# Patient Record
Sex: FEMALE | Race: OTHER | Employment: UNEMPLOYED | URBAN - METROPOLITAN AREA
[De-identification: names, ages, dates, MRNs, and addresses within clinical notes are randomized per-mention and may not be internally consistent; named-entity substitution may affect disease eponyms.]

---

## 2017-01-11 ENCOUNTER — TELEPHONE (OUTPATIENT)
Dept: OBGYN CLINIC | Facility: CLINIC | Age: 22
End: 2017-01-11

## 2017-01-11 NOTE — TELEPHONE ENCOUNTER
Pt reported she has been bleeding since Dec 28 th. Changes pad twice a day, bleeding not heavy. Intermittent pelvic pain 8/10. Taking tylenol 300 mg as needed, is giving some relief.    Pt reported after para guard insertion she didn't get her menses until

## 2017-01-13 ENCOUNTER — OFFICE VISIT (OUTPATIENT)
Dept: OBGYN CLINIC | Facility: CLINIC | Age: 22
End: 2017-01-13

## 2017-01-13 VITALS — WEIGHT: 207 LBS | BODY MASS INDEX: 37 KG/M2 | SYSTOLIC BLOOD PRESSURE: 120 MMHG | DIASTOLIC BLOOD PRESSURE: 84 MMHG

## 2017-01-13 DIAGNOSIS — Z30.431 IUD CHECK UP: ICD-10-CM

## 2017-01-13 DIAGNOSIS — N94.6 DYSMENORRHEA: Primary | ICD-10-CM

## 2017-01-13 DIAGNOSIS — Z30.011 ENCOUNTER FOR INITIAL PRESCRIPTION OF CONTRACEPTIVE PILLS: ICD-10-CM

## 2017-01-13 DIAGNOSIS — N92.6 IRREGULAR MENSES: ICD-10-CM

## 2017-01-13 PROCEDURE — 76856 US EXAM PELVIC COMPLETE: CPT | Performed by: OBSTETRICS & GYNECOLOGY

## 2017-01-13 PROCEDURE — 99214 OFFICE O/P EST MOD 30 MIN: CPT | Performed by: OBSTETRICS & GYNECOLOGY

## 2017-01-13 RX ORDER — ACETAMINOPHEN AND CODEINE PHOSPHATE 120; 12 MG/5ML; MG/5ML
0.35 SOLUTION ORAL DAILY
Qty: 3 PACKAGE | Refills: 5 | Status: SHIPPED | OUTPATIENT
Start: 2017-01-13 | End: 2017-02-10

## 2017-01-23 ENCOUNTER — TELEPHONE (OUTPATIENT)
Dept: OBGYN CLINIC | Facility: CLINIC | Age: 22
End: 2017-01-23

## 2017-01-23 ENCOUNTER — TELEPHONE (OUTPATIENT)
Dept: INTERNAL MEDICINE CLINIC | Facility: CLINIC | Age: 22
End: 2017-01-23

## 2017-01-23 ENCOUNTER — OFFICE VISIT (OUTPATIENT)
Dept: OBGYN CLINIC | Facility: CLINIC | Age: 22
End: 2017-01-23

## 2017-01-23 VITALS
SYSTOLIC BLOOD PRESSURE: 106 MMHG | BODY MASS INDEX: 38 KG/M2 | HEART RATE: 85 BPM | WEIGHT: 212.81 LBS | DIASTOLIC BLOOD PRESSURE: 66 MMHG

## 2017-01-23 DIAGNOSIS — T83.89XA HEAVY MENSES DUE TO IUD (HCC): Primary | ICD-10-CM

## 2017-01-23 DIAGNOSIS — Z30.09 ENCOUNTER FOR OTHER GENERAL COUNSELING OR ADVICE ON CONTRACEPTION: ICD-10-CM

## 2017-01-23 DIAGNOSIS — N92.0 HEAVY MENSES DUE TO IUD (HCC): Primary | ICD-10-CM

## 2017-01-23 DIAGNOSIS — N94.6 DYSMENORRHEA: ICD-10-CM

## 2017-01-23 PROCEDURE — 99214 OFFICE O/P EST MOD 30 MIN: CPT | Performed by: OBSTETRICS & GYNECOLOGY

## 2017-01-23 PROCEDURE — 58301 REMOVE INTRAUTERINE DEVICE: CPT | Performed by: OBSTETRICS & GYNECOLOGY

## 2017-01-23 NOTE — TELEPHONE ENCOUNTER
Patient is calling states that she has been having a heavy menstrual period since Friday. She states she has to change her pad every 2-3 hours. She is now feeling weak, and dizzy.  She was also seen by Bettye Renee today to get her IUD removed to see if that

## 2017-01-23 NOTE — TELEPHONE ENCOUNTER
Per pt saturating pad every 2-3 hours since Friday. Got menses on 12/28,  was given pills OCP pills to take once daily for 10 days to normalize period. Per pt bleeding finished Sunday, but then began bleeding Tuesday again. Friday began bleeding heavily.

## 2017-01-23 NOTE — PROGRESS NOTES
HPI:   Paz Xiao is a 24year old female who presents for a hx of heavy menses and cramps/dysmenorrhea on paragard iud,  Pt counseled extensivevly on contraceptive options, all questions answered. Pt wants paragard removed today.       Arsen Rowe Smokeless Status: Never Used                        Alcohol Use: No                     REVIEW OF SYSTEMS:   GENERAL: feels well otherwise  SKIN: denies any unusual skin lesions  EYES:denies blurred vision or double vision  HEENT: denies nasal indicates understanding of these issues and agrees to the plan. The patient is asked to return for f/u.

## 2017-01-23 NOTE — TELEPHONE ENCOUNTER
[place] Islander SPEAKING / PT C/O HEAVY BLEEDING AND LONGER PERIODS , PER MOM VERY PAINFUL , PLS ADVS

## 2017-01-23 NOTE — TELEPHONE ENCOUNTER
Dr Dawna Martin said the bleeding was because of the IUD and instructed to take tylenol and go to ED if feeling worse. I called the patient she is feeling worse said she has some chest pain. Advised patient to go to [facility] Emergency department to be seen.  Pa

## 2017-01-24 ENCOUNTER — HOSPITAL ENCOUNTER (EMERGENCY)
Facility: HOSPITAL | Age: 22
Discharge: HOME | End: 2017-01-24
Attending: EMERGENCY MEDICINE
Payer: MEDICAID

## 2017-01-24 VITALS
DIASTOLIC BLOOD PRESSURE: 69 MMHG | RESPIRATION RATE: 20 BRPM | HEART RATE: 82 BPM | OXYGEN SATURATION: 98 % | TEMPERATURE: 98 F | SYSTOLIC BLOOD PRESSURE: 105 MMHG

## 2017-01-24 DIAGNOSIS — N92.1 METRORRHAGIA: Primary | ICD-10-CM

## 2017-01-24 DIAGNOSIS — N93.8 DYSFUNCTIONAL UTERINE BLEEDING: ICD-10-CM

## 2017-01-24 LAB
ANION GAP SERPL CALC-SCNC: 9 MMOL/L (ref 0–18)
B-HCG UR QL: NEGATIVE
BASOPHILS # BLD: 0 K/UL (ref 0–0.2)
BASOPHILS NFR BLD: 1 %
BILIRUB UR QL: NEGATIVE
BUN SERPL-MCNC: 9 MG/DL (ref 8–20)
BUN/CREAT SERPL: 15.5 (ref 10–20)
CALCIUM SERPL-MCNC: 9.5 MG/DL (ref 8.5–10.5)
CHLORIDE SERPL-SCNC: 103 MMOL/L (ref 95–110)
CO2 SERPL-SCNC: 27 MMOL/L (ref 22–32)
COLOR UR: YELLOW
CREAT SERPL-MCNC: 0.58 MG/DL (ref 0.5–1.5)
D DIMER PPP FEU-MCNC: 0.33 MCG/ML
EOSINOPHIL # BLD: 0.1 K/UL (ref 0–0.7)
EOSINOPHIL NFR BLD: 1 %
ERYTHROCYTE [DISTWIDTH] IN BLOOD BY AUTOMATED COUNT: 13.7 % (ref 11–15)
GLUCOSE SERPL-MCNC: 111 MG/DL (ref 70–99)
GLUCOSE UR-MCNC: NEGATIVE MG/DL
HCT VFR BLD AUTO: 37 % (ref 35–48)
HGB BLD-MCNC: 12.5 G/DL (ref 12–16)
KETONES UR-MCNC: NEGATIVE MG/DL
LYMPHOCYTES # BLD: 1.8 K/UL (ref 1–4)
LYMPHOCYTES NFR BLD: 35 %
MCH RBC QN AUTO: 28.4 PG (ref 27–32)
MCHC RBC AUTO-ENTMCNC: 33.8 G/DL (ref 32–37)
MCV RBC AUTO: 84.1 FL (ref 80–100)
MONOCYTES # BLD: 0.4 K/UL (ref 0–1)
MONOCYTES NFR BLD: 7 %
NEUTROPHILS # BLD AUTO: 2.9 K/UL (ref 1.8–7.7)
NEUTROPHILS NFR BLD: 56 %
NITRITE UR QL STRIP.AUTO: NEGATIVE
OSMOLALITY UR CALC.SUM OF ELEC: 287 MOSM/KG (ref 275–295)
PH UR: 7 [PH] (ref 5–8)
PLATELET # BLD AUTO: 174 K/UL (ref 140–400)
PMV BLD AUTO: 9.7 FL (ref 7.4–10.3)
POTASSIUM SERPL-SCNC: 3.7 MMOL/L (ref 3.3–5.1)
PROT UR-MCNC: 30 MG/DL
RBC # BLD AUTO: 4.4 M/UL (ref 3.7–5.4)
RBC #/AREA URNS AUTO: 1141 /HPF
SODIUM SERPL-SCNC: 139 MMOL/L (ref 136–144)
SP GR UR STRIP: 1.01 (ref 1–1.03)
UROBILINOGEN UR STRIP-ACNC: <2
VIT C UR-MCNC: NEGATIVE MG/DL
WBC # BLD AUTO: 5.1 K/UL (ref 4–11)
WBC #/AREA URNS AUTO: 33 /HPF

## 2017-01-24 PROCEDURE — 81025 URINE PREGNANCY TEST: CPT

## 2017-01-24 PROCEDURE — 85025 COMPLETE CBC W/AUTO DIFF WBC: CPT

## 2017-01-24 PROCEDURE — 81001 URINALYSIS AUTO W/SCOPE: CPT | Performed by: EMERGENCY MEDICINE

## 2017-01-24 PROCEDURE — 93010 ELECTROCARDIOGRAM REPORT: CPT | Performed by: EMERGENCY MEDICINE

## 2017-01-24 PROCEDURE — 80048 BASIC METABOLIC PNL TOTAL CA: CPT

## 2017-01-24 PROCEDURE — 85379 FIBRIN DEGRADATION QUANT: CPT | Performed by: EMERGENCY MEDICINE

## 2017-01-24 PROCEDURE — 99284 EMERGENCY DEPT VISIT MOD MDM: CPT

## 2017-01-24 PROCEDURE — 36415 COLL VENOUS BLD VENIPUNCTURE: CPT

## 2017-01-24 PROCEDURE — 87086 URINE CULTURE/COLONY COUNT: CPT | Performed by: EMERGENCY MEDICINE

## 2017-01-24 PROCEDURE — 93005 ELECTROCARDIOGRAM TRACING: CPT

## 2017-01-25 ENCOUNTER — TELEPHONE (OUTPATIENT)
Dept: OBGYN CLINIC | Facility: CLINIC | Age: 22
End: 2017-01-25

## 2017-01-25 NOTE — ED INITIAL ASSESSMENT (HPI)
Pt has had menstrual bleeding since December 28. Pt has also had chest pain and back pain for the past 4 days.

## 2017-01-25 NOTE — ED PROVIDER NOTES
Patient Seen in: [facility] Emergency Department    History   Patient presents with:  Eval-G (gynecologic)  Dyspnea CARMEN SOB (respiratory)    Stated Complaint:     HPI    Patient is a 56-year-old female who states that she has been having persisten systems reviewed and negative except as noted above. PSFH elements reviewed from today and agreed except as otherwise stated in HPI.     Physical Exam     ED Triage Vitals   BP 01/24/17 1945 129/71 mmHg   Pulse 01/24/17 1945 83   Resp 01/24/17 1945 18 -----------         ------                     CBC W/ DIFFERENTIAL[[id number]]                              Final result                 Please view results for these tests on the individual orders.    D-DIMER   RAINBOW DRAW BLUE   RAINBOW

## 2017-01-25 NOTE — TELEPHONE ENCOUNTER
Burkinan SPEAKING / PT STTS DR PRESCRIBE BC TO STOP THE BLEEDING ,PER PT STILL BLEEDING AND FEELS WEAK , PLS ADVS

## 2017-01-25 NOTE — TELEPHONE ENCOUNTER
Pls note Per pt had Para guard removed this 2 days ago due to heavy bleeding. Was planned on ocp for bleeding, has been taking for 10 days, but started feeling   Dizziness and nausea, HA, short of breath and chest pressure.   Went to ER yesterday had norm

## 2017-02-24 ENCOUNTER — TELEPHONE (OUTPATIENT)
Dept: OBGYN CLINIC | Facility: CLINIC | Age: 22
End: 2017-02-24

## 2017-03-21 ENCOUNTER — HOSPITAL ENCOUNTER (EMERGENCY)
Facility: HOSPITAL | Age: 22
Discharge: HOME | End: 2017-03-21
Attending: EMERGENCY MEDICINE
Payer: MEDICAID

## 2017-03-21 ENCOUNTER — APPOINTMENT (OUTPATIENT)
Dept: CT IMAGING | Facility: HOSPITAL | Age: 22
End: 2017-03-21
Attending: EMERGENCY MEDICINE
Payer: MEDICAID

## 2017-03-21 VITALS
DIASTOLIC BLOOD PRESSURE: 74 MMHG | SYSTOLIC BLOOD PRESSURE: 126 MMHG | BODY MASS INDEX: 36 KG/M2 | OXYGEN SATURATION: 96 % | TEMPERATURE: 98 F | WEIGHT: 201 LBS | HEART RATE: 80 BPM | RESPIRATION RATE: 20 BRPM

## 2017-03-21 DIAGNOSIS — K57.92 ACUTE DIVERTICULITIS: Primary | ICD-10-CM

## 2017-03-21 LAB
ANION GAP SERPL CALC-SCNC: 6 MMOL/L (ref 0–18)
B-HCG UR QL: NEGATIVE
BASOPHILS # BLD: 0 K/UL (ref 0–0.2)
BASOPHILS NFR BLD: 0 %
BILIRUB UR QL: NEGATIVE
BUN SERPL-MCNC: 17 MG/DL (ref 8–20)
BUN/CREAT SERPL: 31.5 (ref 10–20)
CALCIUM SERPL-MCNC: 8.8 MG/DL (ref 8.5–10.5)
CHLORIDE SERPL-SCNC: 105 MMOL/L (ref 95–110)
CLARITY UR: CLEAR
CO2 SERPL-SCNC: 25 MMOL/L (ref 22–32)
COLOR UR: YELLOW
CREAT SERPL-MCNC: 0.54 MG/DL (ref 0.5–1.5)
EOSINOPHIL # BLD: 0.1 K/UL (ref 0–0.7)
EOSINOPHIL NFR BLD: 1 %
ERYTHROCYTE [DISTWIDTH] IN BLOOD BY AUTOMATED COUNT: 14.2 % (ref 11–15)
GLUCOSE SERPL-MCNC: 102 MG/DL (ref 70–99)
GLUCOSE UR-MCNC: NEGATIVE MG/DL
HCT VFR BLD AUTO: 36.2 % (ref 35–48)
HGB BLD-MCNC: 12 G/DL (ref 12–16)
KETONES UR-MCNC: NEGATIVE MG/DL
LEUKOCYTE ESTERASE UR QL STRIP.AUTO: NEGATIVE
LYMPHOCYTES # BLD: 2.7 K/UL (ref 1–4)
LYMPHOCYTES NFR BLD: 35 %
MCH RBC QN AUTO: 27.2 PG (ref 27–32)
MCHC RBC AUTO-ENTMCNC: 33.1 G/DL (ref 32–37)
MCV RBC AUTO: 82 FL (ref 80–100)
MONOCYTES # BLD: 0.4 K/UL (ref 0–1)
MONOCYTES NFR BLD: 6 %
NEUTROPHILS # BLD AUTO: 4.6 K/UL (ref 1.8–7.7)
NEUTROPHILS NFR BLD: 59 %
NITRITE UR QL STRIP.AUTO: NEGATIVE
OSMOLALITY UR CALC.SUM OF ELEC: 284 MOSM/KG (ref 275–295)
PH UR: 7 [PH] (ref 5–8)
PLATELET # BLD AUTO: 202 K/UL (ref 140–400)
PMV BLD AUTO: 9.8 FL (ref 7.4–10.3)
POTASSIUM SERPL-SCNC: 3.8 MMOL/L (ref 3.3–5.1)
PROT UR-MCNC: NEGATIVE MG/DL
RBC # BLD AUTO: 4.42 M/UL (ref 3.7–5.4)
RBC #/AREA URNS AUTO: 18 /HPF
SODIUM SERPL-SCNC: 136 MMOL/L (ref 136–144)
SP GR UR STRIP: 1.02 (ref 1–1.03)
UROBILINOGEN UR STRIP-ACNC: <2
VIT C UR-MCNC: 40 MG/DL
WBC # BLD AUTO: 7.9 K/UL (ref 4–11)
WBC #/AREA URNS AUTO: 1 /HPF

## 2017-03-21 PROCEDURE — 99284 EMERGENCY DEPT VISIT MOD MDM: CPT

## 2017-03-21 PROCEDURE — 96374 THER/PROPH/DIAG INJ IV PUSH: CPT

## 2017-03-21 PROCEDURE — 74176 CT ABD & PELVIS W/O CONTRAST: CPT

## 2017-03-21 PROCEDURE — 81025 URINE PREGNANCY TEST: CPT

## 2017-03-21 PROCEDURE — 81001 URINALYSIS AUTO W/SCOPE: CPT | Performed by: EMERGENCY MEDICINE

## 2017-03-21 PROCEDURE — 80048 BASIC METABOLIC PNL TOTAL CA: CPT | Performed by: EMERGENCY MEDICINE

## 2017-03-21 PROCEDURE — 85025 COMPLETE CBC W/AUTO DIFF WBC: CPT | Performed by: EMERGENCY MEDICINE

## 2017-03-21 RX ORDER — KETOROLAC TROMETHAMINE 30 MG/ML
30 INJECTION, SOLUTION INTRAMUSCULAR; INTRAVENOUS ONCE
Status: COMPLETED | OUTPATIENT
Start: 2017-03-21 | End: 2017-03-21

## 2017-03-21 RX ORDER — AMOXICILLIN AND CLAVULANATE POTASSIUM 875; 125 MG/1; MG/1
1 TABLET, FILM COATED ORAL 2 TIMES DAILY
Qty: 20 TABLET | Refills: 0 | Status: SHIPPED | OUTPATIENT
Start: 2017-03-21 | End: 2017-03-31

## 2017-03-21 RX ORDER — SACCHAROMYCES BOULARDII 250 MG
250 CAPSULE ORAL 2 TIMES DAILY
Qty: 20 CAPSULE | Refills: 0 | Status: SHIPPED | OUTPATIENT
Start: 2017-03-21 | End: 2017-03-31

## 2017-03-21 RX ORDER — KETOROLAC TROMETHAMINE 30 MG/ML
INJECTION, SOLUTION INTRAMUSCULAR; INTRAVENOUS
Status: DISCONTINUED
Start: 2017-03-21 | End: 2017-03-21

## 2017-03-21 RX ORDER — TRAMADOL HYDROCHLORIDE 50 MG/1
50 TABLET ORAL EVERY 4 HOURS PRN
Qty: 14 TABLET | Refills: 0 | Status: SHIPPED | OUTPATIENT
Start: 2017-03-21 | End: 2017-03-28

## 2017-03-21 NOTE — ED PROVIDER NOTES
Patient Seen in: [facility] Emergency Department    History   Patient presents with:  Abdomen/Flank Pain (GI/)  Vomiting    Stated Complaint:     HPI    Patient complains of lower abdominal pain that began yesterday. Pain described as sharp.   P HPI.  Constitutional and vital signs reviewed. All other systems reviewed and negative except as noted above. PSFH elements reviewed from today and agreed except as otherwise stated in HPI.     Physical Exam     ED Triage Vitals   BP 03/21/17 0254 1 Abnormality         Status                     ---------                               -----------         ------                     CBC W/ DIFFERENTIAL[[id number]]                              Final result                 Please view results for these te

## 2017-05-17 ENCOUNTER — OFFICE VISIT (OUTPATIENT)
Dept: INTERNAL MEDICINE CLINIC | Facility: CLINIC | Age: 22
End: 2017-05-17

## 2017-05-17 VITALS
WEIGHT: 211 LBS | OXYGEN SATURATION: 98 % | RESPIRATION RATE: 18 BRPM | SYSTOLIC BLOOD PRESSURE: 118 MMHG | HEIGHT: 63 IN | HEART RATE: 79 BPM | BODY MASS INDEX: 37.39 KG/M2 | DIASTOLIC BLOOD PRESSURE: 76 MMHG

## 2017-05-17 DIAGNOSIS — N89.8 VAGINAL DISCHARGE: Primary | ICD-10-CM

## 2017-05-17 DIAGNOSIS — R21 RASH: ICD-10-CM

## 2017-05-17 DIAGNOSIS — L85.8 KERATOSIS PILARIS: ICD-10-CM

## 2017-05-17 DIAGNOSIS — H53.9 VISION CHANGES: ICD-10-CM

## 2017-05-17 PROCEDURE — 87205 SMEAR GRAM STAIN: CPT | Performed by: FAMILY MEDICINE

## 2017-05-17 PROCEDURE — 87106 FUNGI IDENTIFICATION YEAST: CPT | Performed by: FAMILY MEDICINE

## 2017-05-17 PROCEDURE — 99213 OFFICE O/P EST LOW 20 MIN: CPT | Performed by: FAMILY MEDICINE

## 2017-05-17 PROCEDURE — 87808 TRICHOMONAS ASSAY W/OPTIC: CPT | Performed by: FAMILY MEDICINE

## 2017-05-17 RX ORDER — DIAPER,BRIEF,INFANT-TODD,DISP
1 EACH MISCELLANEOUS 2 TIMES DAILY
Qty: 56 G | Refills: 0 | Status: SHIPPED | OUTPATIENT
Start: 2017-05-17 | End: 2017-08-10

## 2017-05-17 RX ORDER — LORATADINE 10 MG/1
10 TABLET ORAL DAILY
Qty: 30 TABLET | Refills: 1 | Status: SHIPPED | OUTPATIENT
Start: 2017-05-17 | End: 2017-08-10

## 2017-05-17 NOTE — PATIENT INSTRUCTIONS
Allergy medicine daily    Try exfoliating and also using head and shoulders shampoo to arms    Gentle moisturizer such as aveeno or eucerin    hydrocoritisone to more irritated spots    Recommendations on weight loss:  - Drink 8 glasses of water a day  - D

## 2017-05-17 NOTE — PROGRESS NOTES
Galen Collier is a 24year old female who presents for vaginal discharge. The patient complains of vaginal discharge. Pt has had the sx's for 1 week . Pt has itching and had rash. There is no unusual odor. Denies dysuria.   Denies any abdominal pain or Never Smoker                      Smokeless Status: Never Used                        Alcohol Use: No                  REVIEW OF SYSTEMS:   GENERAL: feels tired, no lethargy, no fever, no chills  SKIN: denies any unusual skin lesions, rash.   LUNGS: denies

## 2017-05-20 NOTE — PROGRESS NOTES
Quick Note:    Please tell pt vaginal infection had small amount of yeast- I sent a prescription for diflucan- one time dose to pharmacy.  TY  ______

## 2017-05-22 ENCOUNTER — TELEPHONE (OUTPATIENT)
Dept: INTERNAL MEDICINE CLINIC | Facility: CLINIC | Age: 22
End: 2017-05-22

## 2017-05-22 RX ORDER — FLUCONAZOLE 150 MG/1
TABLET ORAL
COMMUNITY
Start: 2017-05-20 | End: 2017-06-30 | Stop reason: ALTCHOICE

## 2017-05-22 NOTE — TELEPHONE ENCOUNTER
Spoke with the patient she took the medication, explained the one pill should clear the yeast infection.  She will call back if symptoms persist

## 2017-05-26 ENCOUNTER — TELEPHONE (OUTPATIENT)
Dept: INTERNAL MEDICINE CLINIC | Facility: CLINIC | Age: 22
End: 2017-05-26

## 2017-05-26 DIAGNOSIS — N76.0 ACUTE VAGINITIS: ICD-10-CM

## 2017-05-26 DIAGNOSIS — R30.0 DYSURIA: Primary | ICD-10-CM

## 2017-05-26 RX ORDER — FLUCONAZOLE 150 MG/1
150 TABLET ORAL ONCE
Qty: 1 TABLET | Refills: 0 | Status: SHIPPED | OUTPATIENT
Start: 2017-05-26 | End: 2017-09-06

## 2017-05-26 NOTE — TELEPHONE ENCOUNTER
Notified that pt given results of vag panel and still c/o vaginal burning . L VM for pt that will rx second for 2nd diflucan and also topical monistat. If having any dysuria, asked pt to come in to give urine sample.  If not better next week, can see her in

## 2017-05-30 ENCOUNTER — OFFICE VISIT (OUTPATIENT)
Dept: OPTOMETRY | Facility: CLINIC | Age: 22
End: 2017-05-30

## 2017-05-30 DIAGNOSIS — H52.203 ASTIGMATISM OF BOTH EYES, UNSPECIFIED TYPE: Primary | ICD-10-CM

## 2017-05-30 PROCEDURE — 92004 COMPRE OPH EXAM NEW PT 1/>: CPT | Performed by: OPTOMETRIST

## 2017-05-30 PROCEDURE — 92015 DETERMINE REFRACTIVE STATE: CPT | Performed by: OPTOMETRIST

## 2017-05-30 NOTE — PROGRESS NOTES
Alejandro Lake is a 20year old female. HPI:     HPI     Patient is in for an annual eye exam. She used to wear glasses when she was 8years old . She wore for two years and then was told she did not need them anymore.  Has not had any correction since an Allergies    ROS:     ROS     Negative for: Constitutional, Gastrointestinal, Neurological, Skin, Genitourinary, Musculoskeletal, HENT, Endocrine, Cardiovascular, Eyes, Respiratory, Psychiatric, Allergic/Imm, Heme/Lymph    Last edited by Carmen Felix OD on as needed. Patient knows that since it has been over ten years since she wore glasses there may be some adaptation to new RX. No orders of the defined types were placed in this encounter.        Meds This Visit:    No prescriptions requested or order

## 2017-05-30 NOTE — ASSESSMENT & PLAN NOTE
New glasses RX given to update as needed. Patient knows that since it has been over ten years since she wore glasses there may be some adaptation to new RX.

## 2017-05-30 NOTE — PATIENT INSTRUCTIONS
Astigmatism of both eyes  New glasses RX given to update as needed. Patient knows that since it has been over ten years since she wore glasses there may be some adaptation to new RX.

## 2017-06-23 ENCOUNTER — OFFICE VISIT (OUTPATIENT)
Dept: OBGYN CLINIC | Facility: CLINIC | Age: 22
End: 2017-06-23

## 2017-06-23 VITALS
SYSTOLIC BLOOD PRESSURE: 110 MMHG | WEIGHT: 209.81 LBS | BODY MASS INDEX: 37 KG/M2 | HEART RATE: 77 BPM | DIASTOLIC BLOOD PRESSURE: 75 MMHG

## 2017-06-23 DIAGNOSIS — Z30.09 ENCOUNTER FOR OTHER GENERAL COUNSELING OR ADVICE ON CONTRACEPTION: ICD-10-CM

## 2017-06-23 DIAGNOSIS — Z01.419 WELL WOMAN EXAM WITH ROUTINE GYNECOLOGICAL EXAM: Primary | ICD-10-CM

## 2017-06-23 DIAGNOSIS — N92.4 EXCESSIVE BLEEDING IN PREMENOPAUSAL PERIOD: ICD-10-CM

## 2017-06-23 PROCEDURE — 99395 PREV VISIT EST AGE 18-39: CPT | Performed by: OBSTETRICS & GYNECOLOGY

## 2017-06-23 PROCEDURE — 99213 OFFICE O/P EST LOW 20 MIN: CPT | Performed by: OBSTETRICS & GYNECOLOGY

## 2017-06-23 NOTE — PROGRESS NOTES
HPI:   Charlotte Thao is a 20year old female who presents for an annual/pap. Pt requested contraceptive counseling, pt does not want to get pregnant anytime soon, counseled on options, pt wants mirena iud, all questions answered.  Risks/benefits/alternativ NG:Unknown sex; 8 week    CHOLECYSTECTOMY  2014    APPENDECTOMY        Family History   Problem Relation Age of Onset   • Diabetes Father    • Heart Disorder Father    • Heart Attack Father    • Hypertension Paternal Grandmother       Social History:     S 30 minutes three times weekly. The patient indicates understanding of these issues and agrees to the plan. Pt requested contraception counseling, counseled extesnively on options of contraception, all questions answered. Pt req mirena 1 week.

## 2017-06-30 ENCOUNTER — OFFICE VISIT (OUTPATIENT)
Dept: OBGYN CLINIC | Facility: CLINIC | Age: 22
End: 2017-06-30

## 2017-06-30 VITALS — BODY MASS INDEX: 37 KG/M2 | SYSTOLIC BLOOD PRESSURE: 104 MMHG | WEIGHT: 208 LBS | DIASTOLIC BLOOD PRESSURE: 60 MMHG

## 2017-06-30 DIAGNOSIS — Z97.5 IUD CONTRACEPTION: ICD-10-CM

## 2017-06-30 DIAGNOSIS — Z01.812 PRE-PROCEDURAL LABORATORY EXAMINATION: ICD-10-CM

## 2017-06-30 DIAGNOSIS — Z30.430 ENCOUNTER FOR INSERTION OF MIRENA IUD: Primary | ICD-10-CM

## 2017-06-30 DIAGNOSIS — Z30.09 ENCOUNTER FOR OTHER GENERAL COUNSELING OR ADVICE ON CONTRACEPTION: ICD-10-CM

## 2017-06-30 PROCEDURE — 58300 INSERT INTRAUTERINE DEVICE: CPT | Performed by: OBSTETRICS & GYNECOLOGY

## 2017-06-30 PROCEDURE — 99214 OFFICE O/P EST MOD 30 MIN: CPT | Performed by: OBSTETRICS & GYNECOLOGY

## 2017-06-30 PROCEDURE — 81025 URINE PREGNANCY TEST: CPT | Performed by: OBSTETRICS & GYNECOLOGY

## 2017-06-30 NOTE — PROGRESS NOTES
HPI:   Sudha Serra is a 20year old female who presents for a contraception consult and mirena iud insertion. . Pt counseled extensively, all questions answered. Wt Readings from Last 6 Encounters:  06/30/17 : 208 lb (94.3 kg)  06/23/17 : 209 lb 12. Smokeless tobacco: Never Used                      Alcohol use:  No                     REVIEW OF SYSTEMS:   GENERAL: feels well otherwise  SKIN: denies any unusual skin lesions  EYES:denies blurred of these issues and agrees to the plan.   The patient is asked to return for f/u 6 weeks

## 2017-08-01 ENCOUNTER — TELEPHONE (OUTPATIENT)
Dept: PEDIATRICS CLINIC | Facility: CLINIC | Age: 22
End: 2017-08-01

## 2017-08-01 NOTE — TELEPHONE ENCOUNTER
Per patient 3-4 weeks ago she had Mirena IUD inserted  C/o having menses for 10 days now, since the 22nd. Per pt yesterday she felt dizzy and nauseas. Today she is feeling better. Is only wearing panti liner now, bleeding seems to be tapering off.   Pt a

## 2017-08-01 NOTE — TELEPHONE ENCOUNTER
Pt had IUD inserted by dr Madelyn Vazquez and states she is in pain and feels nauseous. Pt states she also has her period.

## 2017-08-10 ENCOUNTER — OFFICE VISIT (OUTPATIENT)
Dept: OBGYN CLINIC | Facility: CLINIC | Age: 22
End: 2017-08-10

## 2017-08-10 VITALS
DIASTOLIC BLOOD PRESSURE: 72 MMHG | SYSTOLIC BLOOD PRESSURE: 105 MMHG | BODY MASS INDEX: 37 KG/M2 | HEART RATE: 91 BPM | WEIGHT: 210.81 LBS

## 2017-08-10 DIAGNOSIS — Z97.5 IUD (INTRAUTERINE DEVICE) IN PLACE: Primary | ICD-10-CM

## 2017-08-10 DIAGNOSIS — N92.1 BREAKTHROUGH BLEEDING: ICD-10-CM

## 2017-08-10 DIAGNOSIS — Z30.431 IUD CHECK UP: ICD-10-CM

## 2017-08-10 PROCEDURE — 99214 OFFICE O/P EST MOD 30 MIN: CPT | Performed by: OBSTETRICS & GYNECOLOGY

## 2017-08-10 NOTE — PROGRESS NOTES
HPI:   Carlos Manuel Mills is a 20year old female who presents for a f/u IUD mirena. Pt with irregular breakthrough bleeding. Pt counseled extensively, all questions answered.        Wt Readings from Last 6 Encounters:  08/10/17 : 210 lb 12.8 oz (95.6 kg)  06/ exertion  CARDIOVASCULAR: denies chest pain on exertion  GI: denies abdominal pain,denies heartburn  : denies dysuria, vaginal discharge or itching,periods regular   MUSCULOSKELETAL: denies back pain  NEURO: denies headaches  PSYCHE: denies depression or

## 2017-09-06 RX ORDER — FLUCONAZOLE 150 MG/1
TABLET ORAL
Qty: 1 TABLET | Refills: 0 | Status: SHIPPED | OUTPATIENT
Start: 2017-09-06 | End: 2018-04-17 | Stop reason: ALTCHOICE

## 2017-10-23 ENCOUNTER — HOSPITAL ENCOUNTER (EMERGENCY)
Facility: HOSPITAL | Age: 22
Discharge: HOME | End: 2017-10-23
Attending: EMERGENCY MEDICINE
Payer: COMMERCIAL

## 2017-10-23 VITALS
TEMPERATURE: 98 F | SYSTOLIC BLOOD PRESSURE: 107 MMHG | BODY MASS INDEX: 37.21 KG/M2 | HEART RATE: 67 BPM | WEIGHT: 210 LBS | OXYGEN SATURATION: 99 % | HEIGHT: 63 IN | DIASTOLIC BLOOD PRESSURE: 64 MMHG | RESPIRATION RATE: 18 BRPM

## 2017-10-23 DIAGNOSIS — H66.91 ACUTE RIGHT OTITIS MEDIA: Primary | ICD-10-CM

## 2017-10-23 DIAGNOSIS — H81.391 PERIPHERAL VERTIGO INVOLVING RIGHT EAR: ICD-10-CM

## 2017-10-23 PROCEDURE — 85025 COMPLETE CBC W/AUTO DIFF WBC: CPT

## 2017-10-23 PROCEDURE — 87186 SC STD MICRODIL/AGAR DIL: CPT | Performed by: EMERGENCY MEDICINE

## 2017-10-23 PROCEDURE — 93005 ELECTROCARDIOGRAM TRACING: CPT

## 2017-10-23 PROCEDURE — 81001 URINALYSIS AUTO W/SCOPE: CPT | Performed by: EMERGENCY MEDICINE

## 2017-10-23 PROCEDURE — 80048 BASIC METABOLIC PNL TOTAL CA: CPT

## 2017-10-23 PROCEDURE — 85025 COMPLETE CBC W/AUTO DIFF WBC: CPT | Performed by: EMERGENCY MEDICINE

## 2017-10-23 PROCEDURE — 87088 URINE BACTERIA CULTURE: CPT | Performed by: EMERGENCY MEDICINE

## 2017-10-23 PROCEDURE — 87086 URINE CULTURE/COLONY COUNT: CPT | Performed by: EMERGENCY MEDICINE

## 2017-10-23 PROCEDURE — 36415 COLL VENOUS BLD VENIPUNCTURE: CPT

## 2017-10-23 PROCEDURE — 81025 URINE PREGNANCY TEST: CPT

## 2017-10-23 PROCEDURE — 93010 ELECTROCARDIOGRAM REPORT: CPT | Performed by: INTERNAL MEDICINE

## 2017-10-23 PROCEDURE — 80048 BASIC METABOLIC PNL TOTAL CA: CPT | Performed by: EMERGENCY MEDICINE

## 2017-10-23 PROCEDURE — 99284 EMERGENCY DEPT VISIT MOD MDM: CPT

## 2017-10-23 RX ORDER — FLUTICASONE PROPIONATE 50 MCG
2 SPRAY, SUSPENSION (ML) NASAL DAILY
Qty: 16 G | Refills: 0 | Status: SHIPPED | OUTPATIENT
Start: 2017-10-23 | End: 2017-11-22

## 2017-10-23 RX ORDER — MECLIZINE HYDROCHLORIDE 25 MG/1
25 TABLET ORAL ONCE
Status: COMPLETED | OUTPATIENT
Start: 2017-10-23 | End: 2017-10-23

## 2017-10-23 RX ORDER — AMOXICILLIN 500 MG/1
500 TABLET, FILM COATED ORAL 3 TIMES DAILY
Qty: 21 TABLET | Refills: 0 | Status: SHIPPED | OUTPATIENT
Start: 2017-10-23 | End: 2017-10-30

## 2017-10-23 RX ORDER — MECLIZINE HYDROCHLORIDE 25 MG/1
25 TABLET ORAL 3 TIMES DAILY PRN
Qty: 20 TABLET | Refills: 0 | Status: SHIPPED | OUTPATIENT
Start: 2017-10-23 | End: 2018-04-17 | Stop reason: ALTCHOICE

## 2017-10-23 NOTE — ED INITIAL ASSESSMENT (HPI)
Pt reports dizziness, HA , and left arm pain for the past 10 days. Pt states if she turns her head too fast she feels dizzy.  Denies cough or fever

## 2017-10-23 NOTE — ED PROVIDER NOTES
Patient Seen in: [facility] [place] AND [facility] Emergency Department    History   Patient presents with:  Dizziness (neurologic)    Stated Complaint:     HPI    Patient presents with concerns of pain in her right ear as well as for the past few days when she turns he as needed.    FLUCONAZOLE 150 MG Oral Tab,  TAKE 1 TABLET(150 MG) BY MOUTH 1 TIME       Family History   Problem Relation Age of Onset   • Diabetes Father    • Heart Disorder Father    • Heart Attack Father    • Hypertension Paternal Grandmother        Smok rales.  Abdomen:  Soft, non-tender, non-distended. No masses, no hepato-splenomegaly. Negative McBurney's point tenderness. Musculoskeletal:  Good muscle tone.   Upper extremity no swelling no redness noted she has full range of motion without pain gomez tests on the individual orders.    RAINBOW DRAW BLUE   RAINBOW DRAW LAVENDER   RAINBOW DRAW DARK GREEN   RAINBOW DRAW LIGHT GREEN   RAINBOW DRAW GOLD   RAINBOW DRAW LAVENDER TALL (BNP)   URINE CULTURE, ROUTINE     EKG    Rate, intervals and axes as noted on

## 2017-10-23 NOTE — ED NOTES
Patient resting in bed, denies dizziness at this time. Per pt, denies any N/V/D. Denies factors that worsen or better dizziness. Per pt, has been having dizziness x 10 days. No fall/syncope.  Denies CP/SOB

## 2018-01-15 ENCOUNTER — APPOINTMENT (OUTPATIENT)
Dept: MRI IMAGING | Facility: HOSPITAL | Age: 23
End: 2018-01-15
Attending: PHYSICIAN ASSISTANT
Payer: MEDICAID

## 2018-01-15 ENCOUNTER — APPOINTMENT (OUTPATIENT)
Dept: CT IMAGING | Facility: HOSPITAL | Age: 23
End: 2018-01-15
Attending: PHYSICIAN ASSISTANT
Payer: MEDICAID

## 2018-01-15 ENCOUNTER — HOSPITAL ENCOUNTER (EMERGENCY)
Facility: HOSPITAL | Age: 23
Discharge: HOME | End: 2018-01-15
Attending: PHYSICIAN ASSISTANT
Payer: MEDICAID

## 2018-01-15 VITALS
OXYGEN SATURATION: 99 % | RESPIRATION RATE: 14 BRPM | HEIGHT: 63 IN | WEIGHT: 213 LBS | HEART RATE: 76 BPM | DIASTOLIC BLOOD PRESSURE: 72 MMHG | TEMPERATURE: 98 F | BODY MASS INDEX: 37.74 KG/M2 | SYSTOLIC BLOOD PRESSURE: 103 MMHG

## 2018-01-15 DIAGNOSIS — R11.0 NAUSEA: ICD-10-CM

## 2018-01-15 DIAGNOSIS — R20.2 PARESTHESIAS: ICD-10-CM

## 2018-01-15 DIAGNOSIS — R51.9 ACUTE NONINTRACTABLE HEADACHE, UNSPECIFIED HEADACHE TYPE: Primary | ICD-10-CM

## 2018-01-15 LAB
ANION GAP SERPL CALC-SCNC: 10 MMOL/L (ref 0–18)
BASOPHILS # BLD: 0 K/UL (ref 0–0.2)
BASOPHILS NFR BLD: 1 %
BUN SERPL-MCNC: 10 MG/DL (ref 8–20)
BUN/CREAT SERPL: 20.4 (ref 10–20)
CALCIUM SERPL-MCNC: 9.1 MG/DL (ref 8.5–10.5)
CHLORIDE SERPL-SCNC: 102 MMOL/L (ref 95–110)
CO2 SERPL-SCNC: 25 MMOL/L (ref 22–32)
CREAT SERPL-MCNC: 0.49 MG/DL (ref 0.5–1.5)
EOSINOPHIL # BLD: 0 K/UL (ref 0–0.7)
EOSINOPHIL NFR BLD: 1 %
ERYTHROCYTE [DISTWIDTH] IN BLOOD BY AUTOMATED COUNT: 14.2 % (ref 11–15)
GLUCOSE SERPL-MCNC: 75 MG/DL (ref 70–99)
HCT VFR BLD AUTO: 41.6 % (ref 35–48)
HGB BLD-MCNC: 13.8 G/DL (ref 12–16)
LYMPHOCYTES # BLD: 1.5 K/UL (ref 1–4)
LYMPHOCYTES NFR BLD: 36 %
MAGNESIUM SERPL-MCNC: 1.8 MG/DL (ref 1.8–2.5)
MCH RBC QN AUTO: 27.8 PG (ref 27–32)
MCHC RBC AUTO-ENTMCNC: 33.1 G/DL (ref 32–37)
MCV RBC AUTO: 83.9 FL (ref 80–100)
MONOCYTES # BLD: 0.3 K/UL (ref 0–1)
MONOCYTES NFR BLD: 7 %
NEUTROPHILS # BLD AUTO: 2.3 K/UL (ref 1.8–7.7)
NEUTROPHILS NFR BLD: 56 %
OSMOLALITY UR CALC.SUM OF ELEC: 282 MOSM/KG (ref 275–295)
PLATELET # BLD AUTO: 184 K/UL (ref 140–400)
PMV BLD AUTO: 9.4 FL (ref 7.4–10.3)
POTASSIUM SERPL-SCNC: 3.8 MMOL/L (ref 3.3–5.1)
RBC # BLD AUTO: 4.96 M/UL (ref 3.7–5.4)
SODIUM SERPL-SCNC: 137 MMOL/L (ref 136–144)
WBC # BLD AUTO: 4.2 K/UL (ref 4–11)

## 2018-01-15 PROCEDURE — 99285 EMERGENCY DEPT VISIT HI MDM: CPT

## 2018-01-15 PROCEDURE — 80048 BASIC METABOLIC PNL TOTAL CA: CPT | Performed by: PHYSICIAN ASSISTANT

## 2018-01-15 PROCEDURE — 93005 ELECTROCARDIOGRAM TRACING: CPT

## 2018-01-15 PROCEDURE — 70450 CT HEAD/BRAIN W/O DYE: CPT | Performed by: PHYSICIAN ASSISTANT

## 2018-01-15 PROCEDURE — 85025 COMPLETE CBC W/AUTO DIFF WBC: CPT | Performed by: PHYSICIAN ASSISTANT

## 2018-01-15 PROCEDURE — 96361 HYDRATE IV INFUSION ADD-ON: CPT

## 2018-01-15 PROCEDURE — 96360 HYDRATION IV INFUSION INIT: CPT

## 2018-01-15 PROCEDURE — 70551 MRI BRAIN STEM W/O DYE: CPT | Performed by: PHYSICIAN ASSISTANT

## 2018-01-15 PROCEDURE — 83735 ASSAY OF MAGNESIUM: CPT | Performed by: PHYSICIAN ASSISTANT

## 2018-01-15 PROCEDURE — 93010 ELECTROCARDIOGRAM REPORT: CPT | Performed by: PHYSICIAN ASSISTANT

## 2018-01-15 RX ORDER — ONDANSETRON 4 MG/1
4 TABLET, ORALLY DISINTEGRATING ORAL EVERY 6 HOURS PRN
Qty: 10 TABLET | Refills: 0 | Status: SHIPPED | OUTPATIENT
Start: 2018-01-15 | End: 2018-01-18

## 2018-01-15 RX ORDER — BUTALBITAL, ACETAMINOPHEN AND CAFFEINE 50; 325; 40 MG/1; MG/1; MG/1
1-2 TABLET ORAL EVERY 6 HOURS PRN
Qty: 14 TABLET | Refills: 0 | Status: SHIPPED | OUTPATIENT
Start: 2018-01-15 | End: 2018-01-22

## 2018-01-15 NOTE — ED INITIAL ASSESSMENT (HPI)
Intermittent headaches and nausea since Thursday and right face numbness radiating to right arm since Friday. No other neuro complaints.

## 2018-01-15 NOTE — ED PROVIDER NOTES
Patient Seen in: [facility] [place] AND [facility] Emergency Department    History   Patient presents with:  Headache  Numbness    Stated Complaint: numbness, headache, nausea    HPI     65-year-old female presents with chief complaint of right-sided headache.   Onset 4 tablets by mouth every 6 (six) hours as needed for Headaches. Meclizine HCl 25 MG Oral Tab,  Take 1 tablet (25 mg total) by mouth 3 (three) times daily as needed.    FLUCONAZOLE 150 MG Oral Tab,  TAKE 1 TABLET(150 MG) BY MOUTH 1 TIME       Family History Respiratory effort was normal. There is no rales, wheezes, or rhonchi. There is no stridor. Air entry is equal.  Cardiovascular: Regular rate and rhythm, no murmurs, gallops, rubs. Capillary refill is brisk.   Gastrointestinal: Abdomen soft, nontender, nond 64  Rhythm: Sinus Rhythm  Reading: No STEMI           MDM     Radiology findings: Ct Brain Or Head ([id number])    Result Date: 1/15/2018  CONCLUSION:  1. No acute disease in the brain. No focal infarct, mass or hemorrhage. Hyperostosis frontalis interna noted.

## 2018-04-17 ENCOUNTER — OFFICE VISIT (OUTPATIENT)
Dept: INTERNAL MEDICINE CLINIC | Facility: CLINIC | Age: 23
End: 2018-04-17

## 2018-04-17 VITALS
RESPIRATION RATE: 17 BRPM | BODY MASS INDEX: 35.61 KG/M2 | HEART RATE: 79 BPM | DIASTOLIC BLOOD PRESSURE: 76 MMHG | WEIGHT: 201 LBS | OXYGEN SATURATION: 99 % | SYSTOLIC BLOOD PRESSURE: 112 MMHG | HEIGHT: 63 IN

## 2018-04-17 DIAGNOSIS — K42.9 UMBILICAL HERNIA WITHOUT OBSTRUCTION AND WITHOUT GANGRENE: ICD-10-CM

## 2018-04-17 DIAGNOSIS — B00.2 ORAL HERPES SIMPLEX INFECTION: Primary | ICD-10-CM

## 2018-04-17 PROCEDURE — 99213 OFFICE O/P EST LOW 20 MIN: CPT | Performed by: FAMILY MEDICINE

## 2018-04-17 RX ORDER — VALACYCLOVIR HYDROCHLORIDE 1 G/1
1 TABLET, FILM COATED ORAL EVERY 12 HOURS SCHEDULED
Qty: 6 TABLET | Refills: 2 | Status: SHIPPED | OUTPATIENT
Start: 2018-04-17 | End: 2018-04-20

## 2018-04-17 NOTE — PROGRESS NOTES
CC:  Headache (Pt presents to clinic for c/o headaches/jaw pain that started after developing cold sore x 1 wk.) and Pain (Pt also c/o umbilical pain x wks.  )      Hx of CC:  S/P LEFT FACIAL/JAW PAIN WHICH HAS LARGELY RESOLVED BUT LEFT UPPER LIP COLD SORE

## 2018-05-07 ENCOUNTER — TELEPHONE (OUTPATIENT)
Dept: INTERNAL MEDICINE CLINIC | Facility: CLINIC | Age: 23
End: 2018-05-07

## 2018-05-09 ENCOUNTER — OFFICE VISIT (OUTPATIENT)
Dept: INTERNAL MEDICINE CLINIC | Facility: CLINIC | Age: 23
End: 2018-05-09

## 2018-05-09 VITALS
WEIGHT: 201 LBS | OXYGEN SATURATION: 98 % | RESPIRATION RATE: 16 BRPM | HEART RATE: 92 BPM | HEIGHT: 63 IN | BODY MASS INDEX: 35.61 KG/M2 | SYSTOLIC BLOOD PRESSURE: 114 MMHG | DIASTOLIC BLOOD PRESSURE: 70 MMHG

## 2018-05-09 DIAGNOSIS — N64.4 NIPPLE PAIN: Primary | ICD-10-CM

## 2018-05-09 PROCEDURE — 81025 URINE PREGNANCY TEST: CPT | Performed by: FAMILY MEDICINE

## 2018-05-09 PROCEDURE — 99213 OFFICE O/P EST LOW 20 MIN: CPT | Performed by: FAMILY MEDICINE

## 2018-05-09 NOTE — PATIENT INSTRUCTIONS
Avoid caffiene    Can recheck pregnancy test if not period over next few weeks, but may be irregular with IUD

## 2018-05-09 NOTE — PROGRESS NOTES
CC:  Pain (Pt presents to clinic for c/o b/l nipple pain x 4 days-->states has small bump on left nipple that causes pain. No discharge.  @ home pregancy test negative on Monday. )      Hx of CC:    Bilateral nipple pain   Felt a ball near under left nipple Pulmonary:  No cough, no SOB  Breast: see HPI  Dermatologic:  No rashes    Physical:    /70 (BP Location: Right arm, Patient Position: Sitting, Cuff Size: adult)   Pulse 92   Resp 16   Ht 63\"   Wt 201 lb   SpO2 98%   BMI 35.61 kg/m²     General:

## 2018-06-02 ENCOUNTER — HOSPITAL ENCOUNTER (EMERGENCY)
Facility: HOSPITAL | Age: 23
Discharge: HOME | End: 2018-06-02
Attending: EMERGENCY MEDICINE

## 2018-06-02 ENCOUNTER — APPOINTMENT (OUTPATIENT)
Dept: ULTRASOUND IMAGING | Facility: HOSPITAL | Age: 23
End: 2018-06-02
Attending: NURSE PRACTITIONER

## 2018-06-02 VITALS
HEIGHT: 63 IN | RESPIRATION RATE: 14 BRPM | HEART RATE: 62 BPM | TEMPERATURE: 98 F | DIASTOLIC BLOOD PRESSURE: 62 MMHG | WEIGHT: 203 LBS | SYSTOLIC BLOOD PRESSURE: 113 MMHG | BODY MASS INDEX: 35.97 KG/M2 | OXYGEN SATURATION: 100 %

## 2018-06-02 DIAGNOSIS — N83.202 CYST OF LEFT OVARY: ICD-10-CM

## 2018-06-02 DIAGNOSIS — R10.84 ABDOMINAL PAIN, GENERALIZED: Primary | ICD-10-CM

## 2018-06-02 PROCEDURE — 87591 N.GONORRHOEAE DNA AMP PROB: CPT | Performed by: NURSE PRACTITIONER

## 2018-06-02 PROCEDURE — 93975 VASCULAR STUDY: CPT | Performed by: NURSE PRACTITIONER

## 2018-06-02 PROCEDURE — 85025 COMPLETE CBC W/AUTO DIFF WBC: CPT | Performed by: NURSE PRACTITIONER

## 2018-06-02 PROCEDURE — 87205 SMEAR GRAM STAIN: CPT | Performed by: NURSE PRACTITIONER

## 2018-06-02 PROCEDURE — 80048 BASIC METABOLIC PNL TOTAL CA: CPT | Performed by: NURSE PRACTITIONER

## 2018-06-02 PROCEDURE — 96375 TX/PRO/DX INJ NEW DRUG ADDON: CPT

## 2018-06-02 PROCEDURE — 76856 US EXAM PELVIC COMPLETE: CPT | Performed by: NURSE PRACTITIONER

## 2018-06-02 PROCEDURE — 81003 URINALYSIS AUTO W/O SCOPE: CPT | Performed by: NURSE PRACTITIONER

## 2018-06-02 PROCEDURE — 87491 CHLMYD TRACH DNA AMP PROBE: CPT | Performed by: NURSE PRACTITIONER

## 2018-06-02 PROCEDURE — 99284 EMERGENCY DEPT VISIT MOD MDM: CPT

## 2018-06-02 PROCEDURE — 96374 THER/PROPH/DIAG INJ IV PUSH: CPT

## 2018-06-02 PROCEDURE — 76830 TRANSVAGINAL US NON-OB: CPT | Performed by: NURSE PRACTITIONER

## 2018-06-02 PROCEDURE — 87106 FUNGI IDENTIFICATION YEAST: CPT | Performed by: NURSE PRACTITIONER

## 2018-06-02 PROCEDURE — 87808 TRICHOMONAS ASSAY W/OPTIC: CPT | Performed by: NURSE PRACTITIONER

## 2018-06-02 RX ORDER — KETOROLAC TROMETHAMINE 30 MG/ML
30 INJECTION, SOLUTION INTRAMUSCULAR; INTRAVENOUS ONCE
Status: COMPLETED | OUTPATIENT
Start: 2018-06-02 | End: 2018-06-02

## 2018-06-02 RX ORDER — MORPHINE SULFATE 4 MG/ML
4 INJECTION, SOLUTION INTRAMUSCULAR; INTRAVENOUS ONCE
Status: COMPLETED | OUTPATIENT
Start: 2018-06-02 | End: 2018-06-02

## 2018-06-03 NOTE — ED PROVIDER NOTES
Patient Seen in: [facility] Emergency Department    History   Patient presents with:  Pelvic Pain    Stated Complaint: pelvic pain    Patient presents into the emergency room for evaluation of pelvic pain.   Patient states the onset of these sympto week  2014:       Comment: per Nnd degree perineal laceration. Dorina Ernandez was a full term live born 7 pound 15                ounce male delivered by .   No date: REMOVAL GALLBLADDER        Smoking status: Never Smoker No rebound guarding or peritoneal signs. No boardlike rigidity. Genitourinary:   Genitourinary Comments: External genitalia are without rashes or lesions. Speculum exam reveals a round pink cervical eyes.   IUD strings are noted protruding from the cerv patient.         Marcelle HECTOR

## 2018-07-30 ENCOUNTER — HOSPITAL ENCOUNTER (EMERGENCY)
Facility: HOSPITAL | Age: 23
Discharge: HOME | End: 2018-07-30
Attending: EMERGENCY MEDICINE

## 2018-07-30 VITALS
BODY MASS INDEX: 35.44 KG/M2 | RESPIRATION RATE: 18 BRPM | TEMPERATURE: 98 F | HEIGHT: 63 IN | SYSTOLIC BLOOD PRESSURE: 110 MMHG | OXYGEN SATURATION: 98 % | WEIGHT: 200 LBS | HEART RATE: 72 BPM | DIASTOLIC BLOOD PRESSURE: 70 MMHG

## 2018-07-30 DIAGNOSIS — G44.209 ACUTE NON INTRACTABLE TENSION-TYPE HEADACHE: Primary | ICD-10-CM

## 2018-07-30 LAB
B-HCG UR QL: NEGATIVE
BACTERIA UR QL AUTO: NEGATIVE /HPF
BILIRUB UR QL: NEGATIVE
CLARITY UR: CLEAR
COLOR UR: YELLOW
GLUCOSE UR-MCNC: NEGATIVE MG/DL
HGB UR QL STRIP.AUTO: NEGATIVE
KETONES UR-MCNC: NEGATIVE MG/DL
NITRITE UR QL STRIP.AUTO: NEGATIVE
PH UR: 6 [PH] (ref 5–8)
PROT UR-MCNC: NEGATIVE MG/DL
RBC #/AREA URNS AUTO: 3 /HPF
SP GR UR STRIP: 1.02 (ref 1–1.03)
UROBILINOGEN UR STRIP-ACNC: <2
VIT C UR-MCNC: NEGATIVE MG/DL
WBC #/AREA URNS AUTO: 7 /HPF

## 2018-07-30 PROCEDURE — 81001 URINALYSIS AUTO W/SCOPE: CPT | Performed by: EMERGENCY MEDICINE

## 2018-07-30 PROCEDURE — 99283 EMERGENCY DEPT VISIT LOW MDM: CPT

## 2018-07-30 PROCEDURE — 81025 URINE PREGNANCY TEST: CPT

## 2018-07-30 PROCEDURE — 87086 URINE CULTURE/COLONY COUNT: CPT | Performed by: EMERGENCY MEDICINE

## 2018-07-30 RX ORDER — ONDANSETRON 4 MG/1
4 TABLET, ORALLY DISINTEGRATING ORAL EVERY 4 HOURS PRN
Qty: 15 TABLET | Refills: 0 | Status: SHIPPED | OUTPATIENT
Start: 2018-07-30 | End: 2018-10-27

## 2018-07-30 RX ORDER — IBUPROFEN 600 MG/1
600 TABLET ORAL EVERY 8 HOURS PRN
Qty: 30 TABLET | Refills: 0 | Status: SHIPPED | OUTPATIENT
Start: 2018-07-30 | End: 2020-04-23

## 2018-07-30 RX ORDER — ACETAMINOPHEN 500 MG
1000 TABLET ORAL ONCE
Status: COMPLETED | OUTPATIENT
Start: 2018-07-30 | End: 2018-07-30

## 2018-07-30 RX ORDER — ONDANSETRON 4 MG/1
4 TABLET, ORALLY DISINTEGRATING ORAL ONCE
Status: COMPLETED | OUTPATIENT
Start: 2018-07-30 | End: 2018-07-30

## 2018-07-30 RX ORDER — IBUPROFEN 600 MG/1
600 TABLET ORAL ONCE
Status: COMPLETED | OUTPATIENT
Start: 2018-07-30 | End: 2018-07-30

## 2018-07-31 NOTE — ED PROVIDER NOTES
Patient Seen in: [facility] AND [facility] Emergency Department    History   Patient presents with:  Headache (neurologic)      HPI    Patient presents to the ED complaining of a headache for the past 3 days.   Headache is located to the back of her head and she Concern  Caffeine Concern        Yes    Comment:per NG: occasionally        ROS  Pertinent Positives: Headache, neck pain, nausea  All other organ systems are reviewed and are negative. Constitutional and vital signs reviewed.       Social History (1,000 mg Oral Given 7/30/18 2250)   ibuprofen (MOTRIN) tab 600 mg (600 mg Oral Given 7/30/18 2250)   ondansetron (ZOFRAN-ODT) disintegrating tab 4 mg (4 mg Oral Given 7/30/18 2250)         MDM      07/30/18 2029 07/30/18 2256   BP: 106/65 110/70   Pulse soon as possible for a visit in 3 weeks  As needed      Medications Prescribed:  Discharge Medication List as of 7/30/2018 10:56 PM    START taking these medications    ibuprofen 600 MG Oral Tab  Take 1 tablet (600 mg total) by mouth every 8 (eight) hours

## 2018-09-13 ENCOUNTER — TELEPHONE (OUTPATIENT)
Dept: OBGYN CLINIC | Facility: CLINIC | Age: 23
End: 2018-09-13

## 2018-09-13 NOTE — TELEPHONE ENCOUNTER
Pt wants to get IUD removed but is self pay  Wants to know the exact amount she will have to pay   [phone]

## 2018-09-13 NOTE — TELEPHONE ENCOUNTER
[phone] ([place])   Pt advised to contact [facility] estimate dept. [phone] option 6  Verbalized understanding, no further question.

## 2018-10-21 ENCOUNTER — APPOINTMENT (OUTPATIENT)
Dept: GENERAL RADIOLOGY | Facility: HOSPITAL | Age: 23
End: 2018-10-21
Attending: PHYSICIAN ASSISTANT

## 2018-10-21 ENCOUNTER — HOSPITAL ENCOUNTER (EMERGENCY)
Facility: HOSPITAL | Age: 23
Discharge: HOME | End: 2018-10-21
Attending: PHYSICIAN ASSISTANT

## 2018-10-21 VITALS
WEIGHT: 196 LBS | DIASTOLIC BLOOD PRESSURE: 65 MMHG | OXYGEN SATURATION: 100 % | HEART RATE: 74 BPM | RESPIRATION RATE: 18 BRPM | TEMPERATURE: 98 F | BODY MASS INDEX: 34.73 KG/M2 | HEIGHT: 63 IN | SYSTOLIC BLOOD PRESSURE: 127 MMHG

## 2018-10-21 DIAGNOSIS — R07.9 CHEST PAIN, UNSPECIFIED TYPE: Primary | ICD-10-CM

## 2018-10-21 DIAGNOSIS — M79.602 LEFT ARM PAIN: ICD-10-CM

## 2018-10-21 DIAGNOSIS — M54.2 CERVICAL PAIN (NECK): ICD-10-CM

## 2018-10-21 PROCEDURE — 85025 COMPLETE CBC W/AUTO DIFF WBC: CPT | Performed by: PHYSICIAN ASSISTANT

## 2018-10-21 PROCEDURE — 80048 BASIC METABOLIC PNL TOTAL CA: CPT | Performed by: PHYSICIAN ASSISTANT

## 2018-10-21 PROCEDURE — 93005 ELECTROCARDIOGRAM TRACING: CPT

## 2018-10-21 PROCEDURE — 81025 URINE PREGNANCY TEST: CPT

## 2018-10-21 PROCEDURE — 99285 EMERGENCY DEPT VISIT HI MDM: CPT

## 2018-10-21 PROCEDURE — 71046 X-RAY EXAM CHEST 2 VIEWS: CPT | Performed by: PHYSICIAN ASSISTANT

## 2018-10-21 PROCEDURE — 84484 ASSAY OF TROPONIN QUANT: CPT | Performed by: PHYSICIAN ASSISTANT

## 2018-10-21 PROCEDURE — 93010 ELECTROCARDIOGRAM REPORT: CPT | Performed by: PHYSICIAN ASSISTANT

## 2018-10-21 PROCEDURE — 81001 URINALYSIS AUTO W/SCOPE: CPT | Performed by: PHYSICIAN ASSISTANT

## 2018-10-21 PROCEDURE — 96374 THER/PROPH/DIAG INJ IV PUSH: CPT

## 2018-10-21 PROCEDURE — 72040 X-RAY EXAM NECK SPINE 2-3 VW: CPT | Performed by: PHYSICIAN ASSISTANT

## 2018-10-21 RX ORDER — CYCLOBENZAPRINE HCL 10 MG
10 TABLET ORAL 3 TIMES DAILY PRN
Qty: 14 TABLET | Refills: 0 | Status: SHIPPED | OUTPATIENT
Start: 2018-10-21 | End: 2018-10-28

## 2018-10-21 RX ORDER — IBUPROFEN 600 MG/1
TABLET ORAL
Qty: 20 TABLET | Refills: 0 | Status: SHIPPED | OUTPATIENT
Start: 2018-10-21 | End: 2018-10-27

## 2018-10-21 RX ORDER — KETOROLAC TROMETHAMINE 30 MG/ML
30 INJECTION, SOLUTION INTRAMUSCULAR; INTRAVENOUS ONCE
Status: COMPLETED | OUTPATIENT
Start: 2018-10-21 | End: 2018-10-21

## 2018-10-21 NOTE — ED PROVIDER NOTES
Patient Seen in: [facility] AND [facility] Emergency Department    History   Patient presents with:  Pain    Stated Complaint: L side pain    HPI    50-year-old female presents with chief complaint of the left lateral chest pain. Onset 2 days ago.   Patient comp Take 1 tablet (4 mg total) by mouth every 4 (four) hours as needed for Nausea.        Family History   Problem Relation Age of Onset   • Diabetes Father    • Heart Disorder Father    • Heart Attack Father    • Hypertension Paternal Grandmother        Social rhonchi. There is no stridor. Air entry is equal.  Cardiovascular: Regular rate and rhythm, no murmurs, gallops, rubs. Capillary refill is brisk. Gastrointestinal: Abdomen soft, nontender, nondistended. There is no rebound tenderness or guarding.  No organ and axes as noted on EKG Report. Rate: 71  Rhythm: Sinus Rhythm  Reading: No STEMI           MDM       Radiology findings: Xr Chest Pa + Lat Chest (cpt=71046)    Result Date: 10/21/2018  CONCLUSION: Normal examination.       Dictated by (CST): Tk

## 2018-10-21 NOTE — ED NOTES
Patient states 2 days ago patient noticed pain in her left arm. Yesterday, she noticed a \"burning\" sensation in her left arm pit and left nipple. Denies SOB. Also states she has had neck pain for the last two days.

## 2018-10-27 ENCOUNTER — OFFICE VISIT (OUTPATIENT)
Dept: INTERNAL MEDICINE CLINIC | Facility: CLINIC | Age: 23
End: 2018-10-27

## 2018-10-27 VITALS
OXYGEN SATURATION: 98 % | HEIGHT: 63 IN | HEART RATE: 80 BPM | DIASTOLIC BLOOD PRESSURE: 70 MMHG | SYSTOLIC BLOOD PRESSURE: 120 MMHG | WEIGHT: 200 LBS | BODY MASS INDEX: 35.44 KG/M2

## 2018-10-27 DIAGNOSIS — M54.2 NECK PAIN: ICD-10-CM

## 2018-10-27 DIAGNOSIS — R51.9 HEADACHE BEHIND THE EYES: ICD-10-CM

## 2018-10-27 DIAGNOSIS — R20.2 TINGLING OF LEFT UPPER EXTREMITY: Primary | ICD-10-CM

## 2018-10-27 PROCEDURE — 99214 OFFICE O/P EST MOD 30 MIN: CPT | Performed by: FAMILY MEDICINE

## 2018-10-27 RX ORDER — IBUPROFEN 800 MG/1
800 TABLET ORAL EVERY 8 HOURS PRN
Qty: 30 TABLET | Refills: 1 | Status: SHIPPED | OUTPATIENT
Start: 2018-10-27 | End: 2018-11-03

## 2018-10-27 NOTE — PROGRESS NOTES
Scott Dacosta is a 24year old female.     CC:  Patient presents with:  ER F/U: ER follow up c/c burning sensation on left breast x1 week      HPI:      ER f/u 6 days ago for chest pain and left sided arm/ neck pain  Had CXR and EKG normal     c/o burning L History:   Procedure Laterality Date   • APPENDECTOMY     • APPENDECTOMY     • CHOLECYSTECTOMY     • D & C     • D & C      per NG:Unknown sex; 8 week   •   2014    per Nnd degree perineal laceration.  Bushra Mcguire was a full ter movement  NEURO: A & O x 3, no focal deficits  Neuro- cranial nerve/ Neuro- cranial nerves 2-12 intact, A&0, nl strength in upper and lower extremities, sensation intact, gait normal, normal cerebellar finger to nose testing.  2+ patellar reflexes bilateral

## 2018-11-29 NOTE — TELEPHONE ENCOUNTER
Department of Anesthesiology  Postprocedure Note    Patient: Chela Yang  MRN: [id number]  YOB: 1955  Date of evaluation: 11/29/2018  Time:  1:25 PM     Procedure Summary     Date:  11/29/18 Room / Location:  [facility] ENDO 01 / [facility] OR    Anesthesia Start:   Anesthesia Stop:      Procedure:  EGD BIOPSY (N/A ) Diagnosis:  (Alexa Levy)    Surgeon:  Vicky Hunt MD Responsible Provider:      Anesthesia Type:  general, TIVA ASA Status:  3          Anesthesia Type: No value filed. Danna Phase I:      Danna Phase II:      Last vitals: Reviewed and per EMR flowsheets.        Anesthesia Post Evaluation    Patient location during evaluation: PACU  Patient participation: complete - patient participated  Level of consciousness: awake  Pain score: 0  Airway patency: patent  Nausea & Vomiting: no vomiting and no nausea  Complications: no  Cardiovascular status: hemodynamically stable  Respiratory status: acceptable  Hydration status: stable Received phone call from Jim Willis as on-call physician stating she has sore throat, fever, nausea but no vomiting, and body aches. Fever as high as 104, concerned she has the flu. Notes symptoms started yesterday and are getting worse.  She is taking Tylenol w

## 2019-02-28 ENCOUNTER — TELEPHONE (OUTPATIENT)
Dept: INTERNAL MEDICINE CLINIC | Facility: CLINIC | Age: 24
End: 2019-02-28

## 2019-02-28 DIAGNOSIS — Z97.5 IUD CONTRACEPTION: Primary | ICD-10-CM

## 2019-03-05 ENCOUNTER — TELEPHONE (OUTPATIENT)
Dept: INTERNAL MEDICINE CLINIC | Facility: CLINIC | Age: 24
End: 2019-03-05

## 2019-03-05 ENCOUNTER — TELEPHONE (OUTPATIENT)
Dept: OBGYN CLINIC | Facility: CLINIC | Age: 24
End: 2019-03-05

## 2019-03-05 NOTE — TELEPHONE ENCOUNTER
Pt has an appointment with Dr Margarita Funes on Thursday at 9:40, however she wont have a car and needs to change the appointment to a later appointment however Dr Margarita Funes is booked.  Pt would like a call back from the nurse or  to get her into a later apt time on Thu

## 2019-05-21 ENCOUNTER — OFFICE VISIT (OUTPATIENT)
Dept: OBGYN CLINIC | Facility: CLINIC | Age: 24
End: 2019-05-21
Payer: COMMERCIAL

## 2019-05-21 VITALS — BODY MASS INDEX: 35 KG/M2 | WEIGHT: 200 LBS | DIASTOLIC BLOOD PRESSURE: 70 MMHG | SYSTOLIC BLOOD PRESSURE: 122 MMHG

## 2019-05-21 DIAGNOSIS — R10.2 PELVIC PAIN IN FEMALE: ICD-10-CM

## 2019-05-21 DIAGNOSIS — Z87.42 HISTORY OF OVARIAN CYST: Primary | ICD-10-CM

## 2019-05-21 PROCEDURE — 99213 OFFICE O/P EST LOW 20 MIN: CPT | Performed by: OBSTETRICS & GYNECOLOGY

## 2019-05-21 NOTE — PROGRESS NOTES
HPI:    Patient ID: Briseyda Calderon is a 24year old female. Patient reports pelvic pain this week with Nausea. Has Mirena IUD in place for three years. No menses for two months. Reports negative home pregnancy test.  H/O ovarian cysts.   Discussed obta #[id number]

## 2019-05-22 ENCOUNTER — HOSPITAL ENCOUNTER (OUTPATIENT)
Dept: ULTRASOUND IMAGING | Age: 24
Discharge: HOME | End: 2019-05-22
Attending: OBSTETRICS & GYNECOLOGY
Payer: COMMERCIAL

## 2019-05-22 DIAGNOSIS — Z87.42 HISTORY OF OVARIAN CYST: ICD-10-CM

## 2019-05-22 PROCEDURE — 76856 US EXAM PELVIC COMPLETE: CPT | Performed by: OBSTETRICS & GYNECOLOGY

## 2019-05-22 PROCEDURE — 76830 TRANSVAGINAL US NON-OB: CPT | Performed by: OBSTETRICS & GYNECOLOGY

## 2019-05-23 ENCOUNTER — TELEPHONE (OUTPATIENT)
Dept: OBGYN CLINIC | Facility: CLINIC | Age: 24
End: 2019-05-23

## 2019-05-23 NOTE — TELEPHONE ENCOUNTER
Pt informed of message below----      -- Message from Harriet Rivers MD sent at 5/22/2019  7:47 PM CDT -----  Normal pelvic U/S.  IUD in proper position. No ovarian cysts seen.   Call patient.     Pt voices understanding to this information and requests

## 2019-05-23 NOTE — TELEPHONE ENCOUNTER
----- Message from Genesis Fontenot MD sent at 5/22/2019  7:47 PM CDT -----  Normal pelvic U/S.  IUD in proper position. No ovarian cysts seen. Call patient.

## 2019-08-07 ENCOUNTER — OFFICE VISIT (OUTPATIENT)
Dept: OBGYN CLINIC | Facility: CLINIC | Age: 24
End: 2019-08-07
Payer: COMMERCIAL

## 2019-08-07 VITALS — BODY MASS INDEX: 36 KG/M2 | DIASTOLIC BLOOD PRESSURE: 62 MMHG | WEIGHT: 204 LBS | SYSTOLIC BLOOD PRESSURE: 108 MMHG

## 2019-08-07 DIAGNOSIS — B37.3 VULVOVAGINITIS DUE TO YEAST: Primary | ICD-10-CM

## 2019-08-07 DIAGNOSIS — Z30.431 IUD CHECK UP: ICD-10-CM

## 2019-08-07 DIAGNOSIS — N89.8 VAGINAL ITCHING: ICD-10-CM

## 2019-08-07 PROBLEM — B37.31 VULVOVAGINITIS DUE TO YEAST: Status: ACTIVE | Noted: 2019-08-07

## 2019-08-07 PROCEDURE — 99214 OFFICE O/P EST MOD 30 MIN: CPT | Performed by: OBSTETRICS & GYNECOLOGY

## 2019-08-07 RX ORDER — CLOTRIMAZOLE AND BETAMETHASONE DIPROPIONATE 10; .64 MG/G; MG/G
1 CREAM TOPICAL 2 TIMES DAILY
Qty: 1 TUBE | Refills: 1 | Status: SHIPPED | OUTPATIENT
Start: 2019-08-07 | End: 2020-04-23

## 2019-08-07 NOTE — PROGRESS NOTES
HPI:   Robert Almaguer is a 21year old female who presents for a hx of vaginal discharge and introitus itching for 2 days. Pt also wants iud checkup. Pt stated she shaved her vulvar area last Friday. No lesions noted by pt.         Wt Readings any unusual skin lesions  EYES:denies blurred vision or double vision  HEENT: denies nasal congestion, sinus pain or ST  LUNGS: denies shortness of breath with exertion  CARDIOVASCULAR: denies chest pain on exertion  GI: denies abdominal pain,denies heartb 36.14 kg/m². , recommended low fat diet and aerobic exercise 30 minutes three times weekly. The patient indicates understanding of these issues and agrees to the plan. The patient is asked to return for an annual visit.

## 2019-08-09 ENCOUNTER — TELEPHONE (OUTPATIENT)
Dept: OBGYN CLINIC | Facility: CLINIC | Age: 24
End: 2019-08-09

## 2019-08-09 LAB
GENITAL VAGINOSIS SCREEN: NEGATIVE
TRICHOMONAS SCREEN: NEGATIVE

## 2019-08-09 NOTE — TELEPHONE ENCOUNTER
Results not yet reviewed by provider. RN routing to [facility] for consideration.

## 2019-08-19 ENCOUNTER — TELEPHONE (OUTPATIENT)
Dept: OBGYN CLINIC | Facility: CLINIC | Age: 24
End: 2019-08-19

## 2019-08-19 NOTE — TELEPHONE ENCOUNTER
Bahamian speaker- Pt wants to know if she should be seen. Pt states that her vagina is very inflamed to the point that she feels her IUD and feels it more when she walks.  Pt states that she has also had headaches and fevers on and off since last week and fe

## 2019-08-23 ENCOUNTER — HOSPITAL ENCOUNTER (EMERGENCY)
Facility: HOSPITAL | Age: 24
Discharge: HOME | End: 2019-08-24
Attending: EMERGENCY MEDICINE
Payer: COMMERCIAL

## 2019-08-23 ENCOUNTER — APPOINTMENT (OUTPATIENT)
Dept: CT IMAGING | Facility: HOSPITAL | Age: 24
End: 2019-08-23
Attending: EMERGENCY MEDICINE
Payer: COMMERCIAL

## 2019-08-23 DIAGNOSIS — R10.9 ABDOMINAL PAIN OF UNKNOWN ETIOLOGY: Primary | ICD-10-CM

## 2019-08-23 LAB
ANION GAP SERPL CALC-SCNC: 9 MMOL/L (ref 0–18)
B-HCG SERPL-ACNC: <1 MIU/ML
B-HCG UR QL: NEGATIVE
BACTERIA UR QL AUTO: NEGATIVE /HPF
BASOPHILS # BLD AUTO: 0.04 X10(3) UL (ref 0–0.2)
BASOPHILS NFR BLD AUTO: 0.6 %
BILIRUB UR QL: NEGATIVE
BUN BLD-MCNC: 13 MG/DL (ref 7–18)
BUN/CREAT SERPL: 15.7 (ref 10–20)
CALCIUM BLD-MCNC: 8.8 MG/DL (ref 8.5–10.1)
CHLORIDE SERPL-SCNC: 108 MMOL/L (ref 98–112)
CLARITY UR: CLEAR
CO2 SERPL-SCNC: 27 MMOL/L (ref 21–32)
COLOR UR: YELLOW
CREAT BLD-MCNC: 0.83 MG/DL (ref 0.55–1.02)
DEPRECATED RDW RBC AUTO: 44.4 FL (ref 35.1–46.3)
EOSINOPHIL # BLD AUTO: 0.08 X10(3) UL (ref 0–0.7)
EOSINOPHIL NFR BLD AUTO: 1.2 %
ERYTHROCYTE [DISTWIDTH] IN BLOOD BY AUTOMATED COUNT: 13.2 % (ref 11–15)
GLUCOSE BLD-MCNC: 92 MG/DL (ref 70–99)
GLUCOSE UR-MCNC: NEGATIVE MG/DL
HCT VFR BLD AUTO: 40.2 % (ref 35–48)
HGB BLD-MCNC: 13 G/DL (ref 12–16)
HGB UR QL STRIP.AUTO: NEGATIVE
IMM GRANULOCYTES # BLD AUTO: 0.02 X10(3) UL (ref 0–1)
IMM GRANULOCYTES NFR BLD: 0.3 %
KETONES UR-MCNC: NEGATIVE MG/DL
LEUKOCYTE ESTERASE UR QL STRIP.AUTO: NEGATIVE
LYMPHOCYTES # BLD AUTO: 2.26 X10(3) UL (ref 1–4)
LYMPHOCYTES NFR BLD AUTO: 32.9 %
MCH RBC QN AUTO: 29.5 PG (ref 26–34)
MCHC RBC AUTO-ENTMCNC: 32.3 G/DL (ref 31–37)
MCV RBC AUTO: 91.2 FL (ref 80–100)
MONOCYTES # BLD AUTO: 0.48 X10(3) UL (ref 0.1–1)
MONOCYTES NFR BLD AUTO: 7 %
NEUTROPHILS # BLD AUTO: 3.99 X10 (3) UL (ref 1.5–7.7)
NEUTROPHILS # BLD AUTO: 3.99 X10(3) UL (ref 1.5–7.7)
NEUTROPHILS NFR BLD AUTO: 58 %
NITRITE UR QL STRIP.AUTO: NEGATIVE
OSMOLALITY SERPL CALC.SUM OF ELEC: 298 MOSM/KG (ref 275–295)
PH UR: 6 [PH] (ref 5–8)
PLATELET # BLD AUTO: 197 10(3)UL (ref 150–450)
POTASSIUM SERPL-SCNC: 3.9 MMOL/L (ref 3.5–5.1)
PROT UR-MCNC: NEGATIVE MG/DL
RBC # BLD AUTO: 4.41 X10(6)UL (ref 3.8–5.3)
RBC #/AREA URNS AUTO: 11 /HPF
SODIUM SERPL-SCNC: 144 MMOL/L (ref 136–145)
SP GR UR STRIP: 1.03 (ref 1–1.03)
UROBILINOGEN UR STRIP-ACNC: 2
VIT C UR-MCNC: 20 MG/DL
WBC # BLD AUTO: 6.9 X10(3) UL (ref 4–11)
WBC #/AREA URNS AUTO: 2 /HPF

## 2019-08-23 PROCEDURE — 81003 URINALYSIS AUTO W/O SCOPE: CPT | Performed by: EMERGENCY MEDICINE

## 2019-08-23 PROCEDURE — 85025 COMPLETE CBC W/AUTO DIFF WBC: CPT | Performed by: EMERGENCY MEDICINE

## 2019-08-23 PROCEDURE — 81025 URINE PREGNANCY TEST: CPT

## 2019-08-23 PROCEDURE — 74177 CT ABD & PELVIS W/CONTRAST: CPT | Performed by: EMERGENCY MEDICINE

## 2019-08-23 PROCEDURE — 80048 BASIC METABOLIC PNL TOTAL CA: CPT | Performed by: EMERGENCY MEDICINE

## 2019-08-23 PROCEDURE — 36415 COLL VENOUS BLD VENIPUNCTURE: CPT

## 2019-08-23 PROCEDURE — 84702 CHORIONIC GONADOTROPIN TEST: CPT | Performed by: EMERGENCY MEDICINE

## 2019-08-23 PROCEDURE — 99284 EMERGENCY DEPT VISIT MOD MDM: CPT

## 2019-08-24 VITALS
OXYGEN SATURATION: 100 % | BODY MASS INDEX: 35.44 KG/M2 | TEMPERATURE: 98 F | WEIGHT: 200 LBS | DIASTOLIC BLOOD PRESSURE: 60 MMHG | HEIGHT: 63 IN | HEART RATE: 60 BPM | SYSTOLIC BLOOD PRESSURE: 123 MMHG | RESPIRATION RATE: 16 BRPM

## 2019-08-24 NOTE — ED PROVIDER NOTES
Patient Seen in: [facility] AND [facility] Emergency Department    History   Patient presents with:  Abdomen/Flank Pain (GI/)    Stated Complaint: abd pain + preg test     HPI    22 yo female with abdominal pain across the lower abdomen and in the low back mid Exam   Constitutional: She is oriented to person, place, and time. She appears well-developed and well-nourished. No distress. HENT:   Head: Normocephalic and atraumatic.    Mouth/Throat: Oropharynx is clear and moist.   Eyes: Pupils are equal, round, and Final result                 Please view results for these tests on the individual orders.    RAINBOW DRAW BLUE   RAINBOW DRAW LAVENDER   RAINBOW DRAW LIGHT GREEN   RAINBOW DRAW GOLD   CBC W/ DIFFERENTIAL              MDM     Labs normal. UCG and quant

## 2019-08-24 NOTE — ED NOTES
Pt presents with pelvic pain x1 week. States she has IUD x3 years, but \"feels it'. Denies n/v. Endorses fever of 101 last weekend, diarrhea x2 days, white vaginal discharge. AOx3, RR even/nonlabored.

## 2020-02-03 ENCOUNTER — HOSPITAL ENCOUNTER (EMERGENCY)
Facility: HOSPITAL | Age: 25
Discharge: HOME | End: 2020-02-03
Attending: EMERGENCY MEDICINE
Payer: COMMERCIAL

## 2020-02-03 VITALS
DIASTOLIC BLOOD PRESSURE: 67 MMHG | HEIGHT: 63 IN | WEIGHT: 210 LBS | TEMPERATURE: 98 F | BODY MASS INDEX: 37.21 KG/M2 | HEART RATE: 71 BPM | SYSTOLIC BLOOD PRESSURE: 113 MMHG | RESPIRATION RATE: 17 BRPM | OXYGEN SATURATION: 96 %

## 2020-02-03 DIAGNOSIS — K52.9 ACUTE GASTROENTERITIS: Primary | ICD-10-CM

## 2020-02-03 LAB
ALBUMIN SERPL-MCNC: 3.9 G/DL (ref 3.4–5)
ALP LIVER SERPL-CCNC: 103 U/L (ref 37–98)
ALT SERPL-CCNC: 47 U/L (ref 13–56)
ANION GAP SERPL CALC-SCNC: 5 MMOL/L (ref 0–18)
AST SERPL-CCNC: 21 U/L (ref 15–37)
B-HCG UR QL: NEGATIVE
BASOPHILS # BLD AUTO: 0.05 X10(3) UL (ref 0–0.2)
BASOPHILS NFR BLD AUTO: 0.5 %
BILIRUB DIRECT SERPL-MCNC: 0.1 MG/DL (ref 0–0.2)
BILIRUB SERPL-MCNC: 0.3 MG/DL (ref 0.1–2)
BILIRUB UR QL: NEGATIVE
BUN BLD-MCNC: 13 MG/DL (ref 7–18)
BUN/CREAT SERPL: 16.5 (ref 10–20)
CALCIUM BLD-MCNC: 8.8 MG/DL (ref 8.5–10.1)
CHLORIDE SERPL-SCNC: 107 MMOL/L (ref 98–112)
CLARITY UR: CLEAR
CO2 SERPL-SCNC: 25 MMOL/L (ref 21–32)
COLOR UR: YELLOW
CREAT BLD-MCNC: 0.79 MG/DL (ref 0.55–1.02)
DEPRECATED RDW RBC AUTO: 41.5 FL (ref 35.1–46.3)
EOSINOPHIL # BLD AUTO: 0.05 X10(3) UL (ref 0–0.7)
EOSINOPHIL NFR BLD AUTO: 0.5 %
ERYTHROCYTE [DISTWIDTH] IN BLOOD BY AUTOMATED COUNT: 12.8 % (ref 11–15)
GLUCOSE BLD-MCNC: 108 MG/DL (ref 70–99)
GLUCOSE UR-MCNC: NEGATIVE MG/DL
HCT VFR BLD AUTO: 43.7 % (ref 35–48)
HGB BLD-MCNC: 14.5 G/DL (ref 12–16)
HGB UR QL STRIP.AUTO: NEGATIVE
IMM GRANULOCYTES # BLD AUTO: 0.04 X10(3) UL (ref 0–1)
IMM GRANULOCYTES NFR BLD: 0.4 %
KETONES UR-MCNC: NEGATIVE MG/DL
LEUKOCYTE ESTERASE UR QL STRIP.AUTO: NEGATIVE
LYMPHOCYTES # BLD AUTO: 1.72 X10(3) UL (ref 1–4)
LYMPHOCYTES NFR BLD AUTO: 16.2 %
M PROTEIN MFR SERPL ELPH: 7.6 G/DL (ref 6.4–8.2)
MCH RBC QN AUTO: 29.7 PG (ref 26–34)
MCHC RBC AUTO-ENTMCNC: 33.2 G/DL (ref 31–37)
MCV RBC AUTO: 89.4 FL (ref 80–100)
MONOCYTES # BLD AUTO: 0.47 X10(3) UL (ref 0.1–1)
MONOCYTES NFR BLD AUTO: 4.4 %
NEUTROPHILS # BLD AUTO: 8.28 X10 (3) UL (ref 1.5–7.7)
NEUTROPHILS # BLD AUTO: 8.28 X10(3) UL (ref 1.5–7.7)
NEUTROPHILS NFR BLD AUTO: 78 %
NITRITE UR QL STRIP.AUTO: NEGATIVE
OSMOLALITY SERPL CALC.SUM OF ELEC: 285 MOSM/KG (ref 275–295)
PH UR: 7 [PH] (ref 5–8)
PLATELET # BLD AUTO: 205 10(3)UL (ref 150–450)
POTASSIUM SERPL-SCNC: 3.9 MMOL/L (ref 3.5–5.1)
PROT UR-MCNC: NEGATIVE MG/DL
RBC # BLD AUTO: 4.89 X10(6)UL (ref 3.8–5.3)
SODIUM SERPL-SCNC: 137 MMOL/L (ref 136–145)
SP GR UR STRIP: 1.02 (ref 1–1.03)
UROBILINOGEN UR STRIP-ACNC: <2
WBC # BLD AUTO: 10.6 X10(3) UL (ref 4–11)

## 2020-02-03 PROCEDURE — 99284 EMERGENCY DEPT VISIT MOD MDM: CPT

## 2020-02-03 PROCEDURE — 81003 URINALYSIS AUTO W/O SCOPE: CPT | Performed by: EMERGENCY MEDICINE

## 2020-02-03 PROCEDURE — 85025 COMPLETE CBC W/AUTO DIFF WBC: CPT | Performed by: EMERGENCY MEDICINE

## 2020-02-03 PROCEDURE — 96374 THER/PROPH/DIAG INJ IV PUSH: CPT

## 2020-02-03 PROCEDURE — 80076 HEPATIC FUNCTION PANEL: CPT | Performed by: EMERGENCY MEDICINE

## 2020-02-03 PROCEDURE — 96361 HYDRATE IV INFUSION ADD-ON: CPT

## 2020-02-03 PROCEDURE — 80048 BASIC METABOLIC PNL TOTAL CA: CPT | Performed by: EMERGENCY MEDICINE

## 2020-02-03 PROCEDURE — 96372 THER/PROPH/DIAG INJ SC/IM: CPT

## 2020-02-03 PROCEDURE — 96375 TX/PRO/DX INJ NEW DRUG ADDON: CPT

## 2020-02-03 PROCEDURE — 81025 URINE PREGNANCY TEST: CPT

## 2020-02-03 RX ORDER — ONDANSETRON 2 MG/ML
4 INJECTION INTRAMUSCULAR; INTRAVENOUS ONCE
Status: COMPLETED | OUTPATIENT
Start: 2020-02-03 | End: 2020-02-03

## 2020-02-03 RX ORDER — KETOROLAC TROMETHAMINE 30 MG/ML
30 INJECTION, SOLUTION INTRAMUSCULAR; INTRAVENOUS ONCE
Status: COMPLETED | OUTPATIENT
Start: 2020-02-03 | End: 2020-02-03

## 2020-02-03 RX ORDER — MAGNESIUM HYDROXIDE/ALUMINUM HYDROXICE/SIMETHICONE 120; 1200; 1200 MG/30ML; MG/30ML; MG/30ML
30 SUSPENSION ORAL ONCE
Status: COMPLETED | OUTPATIENT
Start: 2020-02-03 | End: 2020-02-03

## 2020-02-03 RX ORDER — ONDANSETRON 4 MG/1
4 TABLET, ORALLY DISINTEGRATING ORAL EVERY 4 HOURS PRN
Qty: 10 TABLET | Refills: 0 | Status: SHIPPED | OUTPATIENT
Start: 2020-02-03 | End: 2020-02-10

## 2020-02-03 RX ORDER — DICYCLOMINE HYDROCHLORIDE 10 MG/ML
20 INJECTION INTRAMUSCULAR ONCE
Status: COMPLETED | OUTPATIENT
Start: 2020-02-03 | End: 2020-02-03

## 2020-02-03 RX ORDER — KETOROLAC TROMETHAMINE 30 MG/ML
INJECTION, SOLUTION INTRAMUSCULAR; INTRAVENOUS
Status: DISCONTINUED
Start: 2020-02-03 | End: 2020-02-03

## 2020-02-03 NOTE — ED NOTES
Pt c/o constant middle abd pain with nausea/vomiting that started yesterday. Pt denies any painful urination, denies diarrhea.

## 2020-02-03 NOTE — ED PROVIDER NOTES
Patient Seen in: [facility] AND [facility] Emergency Department      History   Patient presents with:  Abdominal Pain    Stated Complaint: abd pain     HPI    The patient is a 14-year-old female who presents with crampy periumbilical pain nausea vomiting diarrhe signs and nursing note reviewed. Constitutional:       General: She is not in acute distress. Appearance: She is well-developed. She is obese. She is not ill-appearing. HENT:      Head: Normocephalic and atraumatic.    Eyes:      Conjunctiva/sclera: CBC WITH DIFFERENTIAL WITH PLATELET    Narrative: The following orders were created for panel order CBC WITH DIFFERENTIAL WITH PLATELET.   Procedure                               Abnormality         Status                     ---------

## 2020-03-14 ENCOUNTER — OFFICE VISIT (OUTPATIENT)
Dept: INTERNAL MEDICINE CLINIC | Facility: CLINIC | Age: 25
End: 2020-03-14
Payer: COMMERCIAL

## 2020-03-14 VITALS
HEART RATE: 76 BPM | WEIGHT: 207.81 LBS | BODY MASS INDEX: 36.82 KG/M2 | OXYGEN SATURATION: 99 % | DIASTOLIC BLOOD PRESSURE: 60 MMHG | HEIGHT: 63 IN | SYSTOLIC BLOOD PRESSURE: 112 MMHG

## 2020-03-14 DIAGNOSIS — Z91.09 ENVIRONMENTAL ALLERGIES: ICD-10-CM

## 2020-03-14 DIAGNOSIS — R09.82 POST-NASAL DRAINAGE: ICD-10-CM

## 2020-03-14 DIAGNOSIS — J32.0 CHRONIC MAXILLARY SINUSITIS: Primary | ICD-10-CM

## 2020-03-14 PROCEDURE — 99213 OFFICE O/P EST LOW 20 MIN: CPT | Performed by: FAMILY MEDICINE

## 2020-03-14 RX ORDER — AMOXICILLIN AND CLAVULANATE POTASSIUM 875; 125 MG/1; MG/1
1 TABLET, FILM COATED ORAL 2 TIMES DAILY
Qty: 20 TABLET | Refills: 0 | Status: SHIPPED | OUTPATIENT
Start: 2020-03-14 | End: 2020-03-24

## 2020-03-14 RX ORDER — CETIRIZINE HYDROCHLORIDE 10 MG/1
10 TABLET ORAL DAILY
Qty: 30 TABLET | Refills: 5 | Status: ON HOLD | OUTPATIENT
Start: 2020-03-14 | End: 2021-04-08

## 2020-03-14 RX ORDER — FLUTICASONE PROPIONATE 50 MCG
1 SPRAY, SUSPENSION (ML) NASAL DAILY
Qty: 1 BOTTLE | Refills: 1 | Status: SHIPPED | OUTPATIENT
Start: 2020-03-14 | End: 2021-03-09

## 2020-03-14 NOTE — PROGRESS NOTES
CC:  Sore Throat (pt states she has sore throat x several months);  Nose Bleed (last couple weeks hace nose bleeds); and Ear Pain (last few days ear pain (both))      Hx of CC:      Sore throat for a few months and sometimes loses voice when talking a lot Disorder Father    • Heart Attack Father    • Hypertension Paternal Grandmother       Family Status   Relation Status   • Fa    • PGMA (Not Specified)      Social History    Tobacco Use      Smoking status: Never Smoker      Smokeless tobacco: Lisbeth Sanchez Fluticasone Propionate 50 MCG/ACT Nasal Suspension; 1 spray by Each Nare route daily. Dispense: 1 Bottle; Refill: 1    There are no diagnoses linked to this encounter. None  No orders of the defined types were placed in this encounter.

## 2020-03-14 NOTE — PATIENT INSTRUCTIONS
Take antibiotic with food ( augmentin)    Humidifier     Nasal saline- ( over the counter)- to moisten inside of nose    flonase ( prescription nasal spray) helps with allergies/ post nasal drainage but if makes nose bleeding worse stop    Seasonal Allergy when taken before symptoms develop. Unless a prescription antihistamine was prescribed, you can take over-the-counter antihistamines that do not cause drowsiness.  Ask your pharmacist for suggestions.   · Steroid nasal sprays or oral steroids may also be pr diarrhea  · Vomiting blood, or large amounts of blood in stool  · Seizure  · Cold, moist, or pale (blue in color) skin  Date Last Reviewed: 5/1/2017  © 8611-9307 [employer]. [address] [facility], [address] [place] [address]. All rights reserved.  Melvia Nails

## 2020-03-27 ENCOUNTER — TELEPHONE (OUTPATIENT)
Dept: INTERNAL MEDICINE CLINIC | Facility: CLINIC | Age: 25
End: 2020-03-27

## 2020-03-27 RX ORDER — PREDNISONE 20 MG/1
20 TABLET ORAL DAILY
Qty: 5 TABLET | Refills: 0 | Status: SHIPPED | OUTPATIENT
Start: 2020-03-27 | End: 2020-04-01

## 2020-03-27 RX ORDER — PREDNISONE 20 MG/1
20 TABLET ORAL DAILY
Qty: 5 TABLET | Refills: 0 | Status: SHIPPED | OUTPATIENT
Start: 2020-03-27 | End: 2020-03-27

## 2020-03-27 NOTE — TELEPHONE ENCOUNTER
Patient was prescribed medication by Dr. Fidel Monroe but was given something different by [facility] pharmacist, she bleeds from nose and patient was instructed to stop use of medication if bleeding continues other symptoms she has is ear ringing.  Please call randy

## 2020-03-27 NOTE — TELEPHONE ENCOUNTER
Called patient back. She states that the Flonase is causing irritation and a little bit of bleeding to her nasal drainage. Also states that her ear still feels very full and sometimes get a ringing sensation to them at times and she made her feel dizzy.

## 2020-04-21 ENCOUNTER — TELEPHONE (OUTPATIENT)
Dept: OBGYN CLINIC | Facility: CLINIC | Age: 25
End: 2020-04-21

## 2020-04-21 NOTE — TELEPHONE ENCOUNTER
Pt Name and  verified. Pt states that she would like to schedule an apt for IUD removal. Pt states that she has been experiencing some increased pelvic pain in the last two weeks. Pt states that she also has pain when she has intercourse.  Pt would li

## 2020-04-23 ENCOUNTER — OFFICE VISIT (OUTPATIENT)
Dept: OBGYN CLINIC | Facility: CLINIC | Age: 25
End: 2020-04-23
Payer: COMMERCIAL

## 2020-04-23 VITALS
BODY MASS INDEX: 36.68 KG/M2 | SYSTOLIC BLOOD PRESSURE: 112 MMHG | HEIGHT: 63 IN | DIASTOLIC BLOOD PRESSURE: 74 MMHG | WEIGHT: 207 LBS

## 2020-04-23 DIAGNOSIS — R10.2 PELVIC CRAMPING: Primary | ICD-10-CM

## 2020-04-23 DIAGNOSIS — Z97.5 IUD CONTRACEPTION: ICD-10-CM

## 2020-04-23 DIAGNOSIS — Z30.432 ENCOUNTER FOR IUD REMOVAL: ICD-10-CM

## 2020-04-23 PROCEDURE — 58301 REMOVE INTRAUTERINE DEVICE: CPT | Performed by: OBSTETRICS & GYNECOLOGY

## 2020-04-23 PROCEDURE — 99213 OFFICE O/P EST LOW 20 MIN: CPT | Performed by: OBSTETRICS & GYNECOLOGY

## 2020-04-23 NOTE — PROGRESS NOTES
HPI:   Trayc Timmons is a 21year old female who presents for a hx of pelvic discomfort, pt stated she wants a break from hormonal contraception as well. Pt counseled on contraception options and all questions answered.   Pt is opting for condom Used    Alcohol use: No      Alcohol/week: 0.0 standard drinks    Drug use: No           REVIEW OF SYSTEMS:   GENERAL: feels well otherwise  SKIN: denies any unusual skin lesions  EYES:denies blurred vision or double vision  HEENT: denies nasal congestion, months. Pt wants condom use at this time. . Self breast exam explained. Health maintenance. Body mass index is 36.67 kg/m². , recommended low fat diet and aerobic exercise 30 minutes three times weekly.   The patient indicates understanding of these issues

## 2020-04-24 PROBLEM — R10.2 PELVIC CRAMPING: Status: ACTIVE | Noted: 2020-04-24

## 2020-04-24 PROBLEM — Z97.5 IUD CONTRACEPTION: Status: ACTIVE | Noted: 2020-04-24

## 2020-08-19 ENCOUNTER — HOSPITAL ENCOUNTER (EMERGENCY)
Facility: HOSPITAL | Age: 25
Discharge: HOME | End: 2020-08-19
Attending: EMERGENCY MEDICINE
Payer: COMMERCIAL

## 2020-08-19 ENCOUNTER — TELEPHONE (OUTPATIENT)
Dept: OBGYN CLINIC | Facility: CLINIC | Age: 25
End: 2020-08-19

## 2020-08-19 VITALS
SYSTOLIC BLOOD PRESSURE: 111 MMHG | WEIGHT: 210 LBS | HEIGHT: 63 IN | DIASTOLIC BLOOD PRESSURE: 67 MMHG | TEMPERATURE: 99 F | RESPIRATION RATE: 16 BRPM | BODY MASS INDEX: 37.21 KG/M2 | OXYGEN SATURATION: 99 % | HEART RATE: 71 BPM

## 2020-08-19 DIAGNOSIS — N93.9 EPISODE OF HEAVY VAGINAL BLEEDING: Primary | ICD-10-CM

## 2020-08-19 LAB
ANION GAP SERPL CALC-SCNC: 6 MMOL/L (ref 0–18)
B-HCG SERPL-ACNC: <1 MIU/ML
B-HCG UR QL: NEGATIVE
BASOPHILS # BLD AUTO: 0.03 X10(3) UL (ref 0–0.2)
BASOPHILS NFR BLD AUTO: 0.4 %
BILIRUB UR QL: NEGATIVE
BUN BLD-MCNC: 11 MG/DL (ref 7–18)
BUN/CREAT SERPL: 17.7 (ref 10–20)
CALCIUM BLD-MCNC: 8.6 MG/DL (ref 8.5–10.1)
CHLORIDE SERPL-SCNC: 107 MMOL/L (ref 98–112)
CLARITY UR: CLEAR
CO2 SERPL-SCNC: 26 MMOL/L (ref 21–32)
COLOR UR: YELLOW
CREAT BLD-MCNC: 0.62 MG/DL (ref 0.55–1.02)
DEPRECATED RDW RBC AUTO: 42.3 FL (ref 35.1–46.3)
EOSINOPHIL # BLD AUTO: 0.08 X10(3) UL (ref 0–0.7)
EOSINOPHIL NFR BLD AUTO: 1.1 %
ERYTHROCYTE [DISTWIDTH] IN BLOOD BY AUTOMATED COUNT: 12.9 % (ref 11–15)
GLUCOSE BLD-MCNC: 99 MG/DL (ref 70–99)
GLUCOSE UR-MCNC: NEGATIVE MG/DL
HCT VFR BLD AUTO: 40.3 % (ref 35–48)
HGB BLD-MCNC: 13.5 G/DL (ref 12–16)
HGB UR QL STRIP.AUTO: NEGATIVE
IMM GRANULOCYTES # BLD AUTO: 0.02 X10(3) UL (ref 0–1)
IMM GRANULOCYTES NFR BLD: 0.3 %
KETONES UR-MCNC: NEGATIVE MG/DL
LEUKOCYTE ESTERASE UR QL STRIP.AUTO: NEGATIVE
LYMPHOCYTES # BLD AUTO: 2.16 X10(3) UL (ref 1–4)
LYMPHOCYTES NFR BLD AUTO: 29.1 %
MCH RBC QN AUTO: 29.9 PG (ref 26–34)
MCHC RBC AUTO-ENTMCNC: 33.5 G/DL (ref 31–37)
MCV RBC AUTO: 89.2 FL (ref 80–100)
MONOCYTES # BLD AUTO: 0.43 X10(3) UL (ref 0.1–1)
MONOCYTES NFR BLD AUTO: 5.8 %
NEUTROPHILS # BLD AUTO: 4.69 X10 (3) UL (ref 1.5–7.7)
NEUTROPHILS # BLD AUTO: 4.69 X10(3) UL (ref 1.5–7.7)
NEUTROPHILS NFR BLD AUTO: 63.3 %
NITRITE UR QL STRIP.AUTO: NEGATIVE
OSMOLALITY SERPL CALC.SUM OF ELEC: 287 MOSM/KG (ref 275–295)
PH UR: 5 [PH] (ref 5–8)
PLATELET # BLD AUTO: 189 10(3)UL (ref 150–450)
POTASSIUM SERPL-SCNC: 3.8 MMOL/L (ref 3.5–5.1)
PROT UR-MCNC: NEGATIVE MG/DL
RBC # BLD AUTO: 4.52 X10(6)UL (ref 3.8–5.3)
SODIUM SERPL-SCNC: 139 MMOL/L (ref 136–145)
SP GR UR STRIP: 1.02 (ref 1–1.03)
UROBILINOGEN UR STRIP-ACNC: <2
WBC # BLD AUTO: 7.4 X10(3) UL (ref 4–11)

## 2020-08-19 PROCEDURE — 81003 URINALYSIS AUTO W/O SCOPE: CPT | Performed by: EMERGENCY MEDICINE

## 2020-08-19 PROCEDURE — 99284 EMERGENCY DEPT VISIT MOD MDM: CPT

## 2020-08-19 PROCEDURE — 85025 COMPLETE CBC W/AUTO DIFF WBC: CPT | Performed by: EMERGENCY MEDICINE

## 2020-08-19 PROCEDURE — 51702 INSERT TEMP BLADDER CATH: CPT

## 2020-08-19 PROCEDURE — 80048 BASIC METABOLIC PNL TOTAL CA: CPT | Performed by: EMERGENCY MEDICINE

## 2020-08-19 PROCEDURE — 36415 COLL VENOUS BLD VENIPUNCTURE: CPT

## 2020-08-19 PROCEDURE — 81025 URINE PREGNANCY TEST: CPT

## 2020-08-19 PROCEDURE — 84702 CHORIONIC GONADOTROPIN TEST: CPT | Performed by: EMERGENCY MEDICINE

## 2020-08-19 NOTE — TELEPHONE ENCOUNTER
[employer] phone line  #[phone] used to translate phone call. Pt voices she started her menses this am. Pt voices she has been saturating a pad every hour since this am and passing small clots. Pt using condoms only for evaluation.  Advised pt she nee

## 2020-08-19 NOTE — ED NOTES
Patient;s urine sample frankly bloody. Unable to perform accurate POC preg test or send for urinalysis. MD Higginbotham aware.

## 2020-08-19 NOTE — ED PROVIDER NOTES
Patient Seen in: [facility] AND [facility] Emergency Department      History   Patient presents with:  Vaginal Bleeding    Stated Complaint: Bleeding Hemorrhoids     HPI    71-year-old female presents for complaint of vaginal bleeding.   Patient states that she h are as noted in HPI. Constitutional and vital signs reviewed. All other systems reviewed and negative except as noted above.     Physical Exam     ED Triage Vitals [08/19/20 1651]   /70   Pulse 69   Resp 18   Temp 98.6 °F (37 °C)   Temp src Oral following orders were created for panel order CBC WITH DIFFERENTIAL WITH PLATELET.   Procedure                               Abnormality         Status                     ---------                               -----------         ------

## 2020-08-19 NOTE — ED NOTES
Patient discharged home in no acute distress. A&Ox4, skin p/w/d, denies cp/sob. Ambulating with steady gait and verbalized understanding of d/c instructions.

## 2020-08-19 NOTE — ED INITIAL ASSESSMENT (HPI)
Pt states she woke up this morning with vaginal bleeding. Pt states her menstrual cycle isn't supposed to start until 2 weeks from now. Pt has had to change her pad every hour and is passing clots. Pt also reports abs cramping.  Pt adds she had her IUD cheikh

## 2020-08-19 NOTE — ED NOTES
Assumed care of Rajeev Jc upon arrival in room 21 via triage. Patient A&O x4, reports vaginal bleeding since 6am this morning. Changing her pad hourly x multiple hours; large clots present.  Patient reports mild dizziness; otherwise vss and does not feel lighth

## 2020-08-19 NOTE — TELEPHONE ENCOUNTER
Patient states she has excessive bleeding and blood clots. Patient states she had IUD removed in late April.  Pls advise

## 2020-08-20 NOTE — TELEPHONE ENCOUNTER
Patient calling to speak with nurse, will try nurse line, otherwise please call at:[phone],thanks.

## 2020-08-20 NOTE — TELEPHONE ENCOUNTER
Pt Name and  verified. Pt states that she called yesterday in regards to a heavy bleed that started with clots. Pt was seen in ED yesterday and states that her bleed was not as heavy once she was there.  Pt states that she is no longer wearing a pad,

## 2020-11-07 DIAGNOSIS — J32.0 CHRONIC MAXILLARY SINUSITIS: ICD-10-CM

## 2020-11-09 RX ORDER — AMOXICILLIN AND CLAVULANATE POTASSIUM 875; 125 MG/1; MG/1
TABLET, FILM COATED ORAL
Qty: 20 TABLET | Refills: 0 | OUTPATIENT
Start: 2020-11-09

## 2021-03-08 ENCOUNTER — OFFICE VISIT (OUTPATIENT)
Dept: OBGYN CLINIC | Facility: CLINIC | Age: 26
End: 2021-03-08

## 2021-03-08 ENCOUNTER — HOSPITAL ENCOUNTER (EMERGENCY)
Facility: HOSPITAL | Age: 26
Discharge: HOME | End: 2021-03-08
Attending: EMERGENCY MEDICINE
Payer: MEDICAID

## 2021-03-08 ENCOUNTER — APPOINTMENT (OUTPATIENT)
Dept: ULTRASOUND IMAGING | Facility: HOSPITAL | Age: 26
End: 2021-03-08
Attending: EMERGENCY MEDICINE
Payer: MEDICAID

## 2021-03-08 ENCOUNTER — TELEPHONE (OUTPATIENT)
Dept: OBGYN CLINIC | Facility: CLINIC | Age: 26
End: 2021-03-08

## 2021-03-08 VITALS — BODY MASS INDEX: 40 KG/M2 | WEIGHT: 223 LBS | SYSTOLIC BLOOD PRESSURE: 120 MMHG | DIASTOLIC BLOOD PRESSURE: 72 MMHG

## 2021-03-08 VITALS
OXYGEN SATURATION: 98 % | SYSTOLIC BLOOD PRESSURE: 120 MMHG | HEART RATE: 82 BPM | DIASTOLIC BLOOD PRESSURE: 74 MMHG | TEMPERATURE: 99 F | RESPIRATION RATE: 16 BRPM

## 2021-03-08 DIAGNOSIS — O20.0 THREATENED ABORTION IN EARLY PREGNANCY: ICD-10-CM

## 2021-03-08 DIAGNOSIS — N92.6 MISSED MENSES: ICD-10-CM

## 2021-03-08 DIAGNOSIS — O46.90 VAGINAL BLEEDING IN PREGNANCY: Primary | ICD-10-CM

## 2021-03-08 DIAGNOSIS — N93.9 VAGINAL BLEEDING: Primary | ICD-10-CM

## 2021-03-08 LAB
ANION GAP SERPL CALC-SCNC: 7 MMOL/L (ref 0–18)
B-HCG SERPL-ACNC: ABNORMAL MIU/ML
B-HCG UR QL: POSITIVE
BASOPHILS # BLD AUTO: 0.01 X10(3) UL (ref 0–0.2)
BASOPHILS NFR BLD AUTO: 0.2 %
BILIRUB UR QL: NEGATIVE
BUN BLD-MCNC: 10 MG/DL (ref 7–18)
BUN/CREAT SERPL: 15.9 (ref 10–20)
CALCIUM BLD-MCNC: 8.4 MG/DL (ref 8.5–10.1)
CHLORIDE SERPL-SCNC: 109 MMOL/L (ref 98–112)
CO2 SERPL-SCNC: 26 MMOL/L (ref 21–32)
COLOR UR: YELLOW
CREAT BLD-MCNC: 0.63 MG/DL
DEPRECATED RDW RBC AUTO: 44.6 FL (ref 35.1–46.3)
EOSINOPHIL # BLD AUTO: 0.08 X10(3) UL (ref 0–0.7)
EOSINOPHIL NFR BLD AUTO: 1.6 %
ERYTHROCYTE [DISTWIDTH] IN BLOOD BY AUTOMATED COUNT: 13.5 % (ref 11–15)
GLUCOSE BLD-MCNC: 105 MG/DL (ref 70–99)
GLUCOSE UR-MCNC: NEGATIVE MG/DL
HCT VFR BLD AUTO: 35 %
HGB BLD-MCNC: 11.2 G/DL
IMM GRANULOCYTES # BLD AUTO: 0.01 X10(3) UL (ref 0–1)
IMM GRANULOCYTES NFR BLD: 0.2 %
KETONES UR-MCNC: NEGATIVE MG/DL
LYMPHOCYTES # BLD AUTO: 1.98 X10(3) UL (ref 1–4)
LYMPHOCYTES NFR BLD AUTO: 38.5 %
MCH RBC QN AUTO: 28.3 PG (ref 26–34)
MCHC RBC AUTO-ENTMCNC: 32 G/DL (ref 31–37)
MCV RBC AUTO: 88.4 FL
MONOCYTES # BLD AUTO: 0.4 X10(3) UL (ref 0.1–1)
MONOCYTES NFR BLD AUTO: 7.8 %
NEUTROPHILS # BLD AUTO: 2.66 X10 (3) UL (ref 1.5–7.7)
NEUTROPHILS # BLD AUTO: 2.66 X10(3) UL (ref 1.5–7.7)
NEUTROPHILS NFR BLD AUTO: 51.7 %
NITRITE UR QL STRIP.AUTO: NEGATIVE
OSMOLALITY SERPL CALC.SUM OF ELEC: 293 MOSM/KG (ref 275–295)
PH UR: 6 [PH] (ref 5–8)
PLATELET # BLD AUTO: 159 10(3)UL (ref 150–450)
POTASSIUM SERPL-SCNC: 3.9 MMOL/L (ref 3.5–5.1)
PROT UR-MCNC: NEGATIVE MG/DL
RBC # BLD AUTO: 3.96 X10(6)UL
RH BLOOD TYPE: POSITIVE
SODIUM SERPL-SCNC: 142 MMOL/L (ref 136–145)
SP GR UR STRIP: 1.02 (ref 1–1.03)
UROBILINOGEN UR STRIP-ACNC: <2
WBC # BLD AUTO: 5.1 X10(3) UL (ref 4–11)

## 2021-03-08 PROCEDURE — 85025 COMPLETE CBC W/AUTO DIFF WBC: CPT | Performed by: EMERGENCY MEDICINE

## 2021-03-08 PROCEDURE — 86901 BLOOD TYPING SEROLOGIC RH(D): CPT | Performed by: EMERGENCY MEDICINE

## 2021-03-08 PROCEDURE — 3078F DIAST BP <80 MM HG: CPT | Performed by: OBSTETRICS & GYNECOLOGY

## 2021-03-08 PROCEDURE — 76817 TRANSVAGINAL US OBSTETRIC: CPT | Performed by: EMERGENCY MEDICINE

## 2021-03-08 PROCEDURE — 3074F SYST BP LT 130 MM HG: CPT | Performed by: OBSTETRICS & GYNECOLOGY

## 2021-03-08 PROCEDURE — 80048 BASIC METABOLIC PNL TOTAL CA: CPT | Performed by: EMERGENCY MEDICINE

## 2021-03-08 PROCEDURE — 86900 BLOOD TYPING SEROLOGIC ABO: CPT | Performed by: EMERGENCY MEDICINE

## 2021-03-08 PROCEDURE — 36415 COLL VENOUS BLD VENIPUNCTURE: CPT

## 2021-03-08 PROCEDURE — 81025 URINE PREGNANCY TEST: CPT

## 2021-03-08 PROCEDURE — 99284 EMERGENCY DEPT VISIT MOD MDM: CPT

## 2021-03-08 PROCEDURE — 99214 OFFICE O/P EST MOD 30 MIN: CPT | Performed by: OBSTETRICS & GYNECOLOGY

## 2021-03-08 PROCEDURE — 84702 CHORIONIC GONADOTROPIN TEST: CPT | Performed by: EMERGENCY MEDICINE

## 2021-03-08 PROCEDURE — 81001 URINALYSIS AUTO W/SCOPE: CPT | Performed by: EMERGENCY MEDICINE

## 2021-03-08 PROCEDURE — 87086 URINE CULTURE/COLONY COUNT: CPT | Performed by: EMERGENCY MEDICINE

## 2021-03-08 PROCEDURE — 76801 OB US < 14 WKS SINGLE FETUS: CPT | Performed by: EMERGENCY MEDICINE

## 2021-03-08 NOTE — TELEPHONE ENCOUNTER
Patient name and  verified. Patient was told by radiology soonest appointment was 3/31/2021.  Please advise

## 2021-03-08 NOTE — TELEPHONE ENCOUNTER
Called pt per JJF to be seen in office today. Would offer apt to see ASJ today at 10:20. vm full. pls put through to nurses if pt calls back.

## 2021-03-08 NOTE — ED PROVIDER NOTES
Patient Seen in: [facility] AND [facility] Emergency Department    History   No chief complaint on file. HPI    50-year-old female  presents the ER with complaint of vaginal spotting.   Patient states that 1 AM, she used the bathroom and noted pink/blood- Concerns:        Caffeine Concern: Yes          per NG: occasionally    Social Determinants of Health  Financial Resource Strain:       Difficulty of Paying Living Expenses:   Food Insecurity:       Worried About Running Out of Food in the Last Year:       with super sani-cloth germicidal wipes following the exam.     Physical Exam  Vitals and nursing note reviewed. Constitutional:       Appearance: She is well-developed. HENT:      Head: Normocephalic and atraumatic.       Right Ear: External ear normal. Notable for the following components:    POCT Urine Pregnancy Positive (*)     All other components within normal limits   CBC W/ DIFFERENTIAL - Abnormal; Notable for the following components:    HGB 11.2 (*)     All other components within normal limits *I personally reviewed and interpreted all ED vitals.   I also personally reviewed all labs and imaging if ordered    Pulse Ox: 99%, Room air, Normal     Monitor Interpretation:   normal sinus rhythm    Differential Diagnosis/ Diagnostic Considerations: Medication List

## 2021-03-08 NOTE — ED INITIAL ASSESSMENT (HPI)
S: Vaginal bleeding. B: Patient presents to ED with concern for miscarriage. Believes that she's approx 5-6 weeks pregnant. . C/o vaginal bleeding, abdominal cramping and back pain.

## 2021-03-08 NOTE — PROGRESS NOTES
HPI:   Krista Marcus is a 31year old female who presents for a hx of vaginal bleeding and er visit last night which showed:    Study Result    Narrative   PROCEDURE: US PREGNANCY LESS THAN 14 WEEKS (TRANSABDOMINAL/TRANSVAGINAL) (DOC=[id number] Sebastian Fernandez MD on 3/08/2021 at 6:34 AM       Finalized by (CST): Sebastian Fernandez MD on 3/08/2021 at 6:42 AM        Component   Ref Range & Units 3/8/21  2:20 AM   Hcg Quantitative   <=3.0 mIU/mL 11,967.0High     Comment: The table below represe (94.3 kg)    Body mass index is 39.5 kg/m².      Cholesterol (mg/dL)   Date Value   06/22/2020 136.00     Direct HDL (mg/dL)   Date Value   06/22/2020 46     Calculated LDL (mg/dL)   Date Value   06/22/2020 57     AST (U/L)   Date Value   06/22/2020 29   02 standard drinks    Drug use: No           REVIEW OF SYSTEMS:   GENERAL: feels well otherwise  SKIN: denies any unusual skin lesions  EYES:denies blurred vision or double vision  HEENT: denies nasal congestion, sinus pain or ST  LUNGS: denies shortness of b

## 2021-03-15 ENCOUNTER — APPOINTMENT (OUTPATIENT)
Dept: ULTRASOUND IMAGING | Facility: HOSPITAL | Age: 26
End: 2021-03-15
Attending: NURSE PRACTITIONER
Payer: MEDICAID

## 2021-03-15 ENCOUNTER — TELEPHONE (OUTPATIENT)
Dept: OBGYN CLINIC | Facility: CLINIC | Age: 26
End: 2021-03-15

## 2021-03-15 ENCOUNTER — HOSPITAL ENCOUNTER (EMERGENCY)
Facility: HOSPITAL | Age: 26
Discharge: HOME | End: 2021-03-15
Payer: MEDICAID

## 2021-03-15 VITALS
OXYGEN SATURATION: 97 % | TEMPERATURE: 98 F | HEART RATE: 76 BPM | RESPIRATION RATE: 16 BRPM | WEIGHT: 222.69 LBS | BODY MASS INDEX: 39 KG/M2 | DIASTOLIC BLOOD PRESSURE: 79 MMHG | SYSTOLIC BLOOD PRESSURE: 114 MMHG

## 2021-03-15 DIAGNOSIS — O20.0 THREATENED MISCARRIAGE: Primary | ICD-10-CM

## 2021-03-15 LAB
ANION GAP SERPL CALC-SCNC: 6 MMOL/L (ref 0–18)
B-HCG SERPL-ACNC: ABNORMAL MIU/ML
B-HCG UR QL: POSITIVE
BASOPHILS # BLD AUTO: 0.03 X10(3) UL (ref 0–0.2)
BASOPHILS NFR BLD AUTO: 0.4 %
BILIRUB UR QL: NEGATIVE
BUN BLD-MCNC: 11 MG/DL (ref 7–18)
BUN/CREAT SERPL: 17.7 (ref 10–20)
CALCIUM BLD-MCNC: 8.8 MG/DL (ref 8.5–10.1)
CHLORIDE SERPL-SCNC: 109 MMOL/L (ref 98–112)
CO2 SERPL-SCNC: 24 MMOL/L (ref 21–32)
COLOR UR: YELLOW
CREAT BLD-MCNC: 0.62 MG/DL
DEPRECATED RDW RBC AUTO: 42.3 FL (ref 35.1–46.3)
EOSINOPHIL # BLD AUTO: 0.04 X10(3) UL (ref 0–0.7)
EOSINOPHIL NFR BLD AUTO: 0.6 %
ERYTHROCYTE [DISTWIDTH] IN BLOOD BY AUTOMATED COUNT: 13.5 % (ref 11–15)
GLUCOSE BLD-MCNC: 87 MG/DL (ref 70–99)
GLUCOSE UR-MCNC: NEGATIVE MG/DL
HCT VFR BLD AUTO: 38.4 %
HGB BLD-MCNC: 12.3 G/DL
IMM GRANULOCYTES # BLD AUTO: 0.02 X10(3) UL (ref 0–1)
IMM GRANULOCYTES NFR BLD: 0.3 %
KETONES UR-MCNC: NEGATIVE MG/DL
LYMPHOCYTES # BLD AUTO: 1.98 X10(3) UL (ref 1–4)
LYMPHOCYTES NFR BLD AUTO: 28.4 %
MCH RBC QN AUTO: 27.7 PG (ref 26–34)
MCHC RBC AUTO-ENTMCNC: 32 G/DL (ref 31–37)
MCV RBC AUTO: 86.5 FL
MONOCYTES # BLD AUTO: 0.36 X10(3) UL (ref 0.1–1)
MONOCYTES NFR BLD AUTO: 5.2 %
NEUTROPHILS # BLD AUTO: 4.53 X10 (3) UL (ref 1.5–7.7)
NEUTROPHILS # BLD AUTO: 4.53 X10(3) UL (ref 1.5–7.7)
NEUTROPHILS NFR BLD AUTO: 65.1 %
NITRITE UR QL STRIP.AUTO: NEGATIVE
OSMOLALITY SERPL CALC.SUM OF ELEC: 287 MOSM/KG (ref 275–295)
PH UR: 6 [PH] (ref 5–8)
PLATELET # BLD AUTO: 174 10(3)UL (ref 150–450)
POTASSIUM SERPL-SCNC: 3.7 MMOL/L (ref 3.5–5.1)
PROT UR-MCNC: NEGATIVE MG/DL
RBC # BLD AUTO: 4.44 X10(6)UL
SODIUM SERPL-SCNC: 139 MMOL/L (ref 136–145)
SP GR UR STRIP: 1.02 (ref 1–1.03)
UROBILINOGEN UR STRIP-ACNC: <2
WBC # BLD AUTO: 7 X10(3) UL (ref 4–11)

## 2021-03-15 PROCEDURE — 84702 CHORIONIC GONADOTROPIN TEST: CPT | Performed by: NURSE PRACTITIONER

## 2021-03-15 PROCEDURE — 80048 BASIC METABOLIC PNL TOTAL CA: CPT

## 2021-03-15 PROCEDURE — 81025 URINE PREGNANCY TEST: CPT

## 2021-03-15 PROCEDURE — 99284 EMERGENCY DEPT VISIT MOD MDM: CPT

## 2021-03-15 PROCEDURE — 76801 OB US < 14 WKS SINGLE FETUS: CPT | Performed by: NURSE PRACTITIONER

## 2021-03-15 PROCEDURE — 81001 URINALYSIS AUTO W/SCOPE: CPT

## 2021-03-15 PROCEDURE — 36415 COLL VENOUS BLD VENIPUNCTURE: CPT

## 2021-03-15 PROCEDURE — 85025 COMPLETE CBC W/AUTO DIFF WBC: CPT

## 2021-03-15 PROCEDURE — 87086 URINE CULTURE/COLONY COUNT: CPT

## 2021-03-15 PROCEDURE — 76817 TRANSVAGINAL US OBSTETRIC: CPT | Performed by: NURSE PRACTITIONER

## 2021-03-15 NOTE — ED NOTES
Pt states she is approx 6 weeks pregnant. States started spotting approx 1 week ago and then stopped, but then began again today. Pt had medical care at [address] [facility]"" and states they did an US and stated their was no heart beat.  Pt

## 2021-03-15 NOTE — TELEPHONE ENCOUNTER
Pt states she continues to bleed today, unsure if she should even keep US appt tomorrow.  Please advise

## 2021-03-15 NOTE — ED PROVIDER NOTES
Patient Seen in: [facility] AND [facility] Emergency Department    History   CC: vaginal bleeding in preg  HPI: Will Laquita 31year old female  who presents c/o vaginal spotting in pregnancy for the last 24 hours.  States she had very scant spotting fo Alcohol use: No      Alcohol/week: 0.0 standard drinks    Drug use: No      ROS:  Review of Systems    Positive for stated complaint: 6 wks pregnant, vag bleed  Other systems are as noted in HPI. Constitutional and vital signs reviewed.       All other sys Large (*)     WBC Urine 21-50 (*)     RBC Urine 6-10 (*)     Bacteria Urine Rare (*)     Squamous Epi.  Cells Few (*)     All other components within normal limits   HCG, BETA SUBUNIT (QUANT PREGNANCY TEST) - Abnormal; Notable for the following components: Eh Abraham MD on 3/15/2021 at 3:13 PM       Finalized by (CST): Eh Abraham MD on 3/15/2021 at 3:26 PM                    Narrative:    PROCEDURE: US PREGNANCY LESS THAN 14 WEEKS (TRANSABDOMINAL/TRANSVAGINAL) (LFH=[id number])       COMPAR understanding of all instruction and agrees with plan of care.       Disposition and Plan     Clinical Impression:  Threatened miscarriage  (primary encounter diagnosis)    Disposition:  Discharge    Follow-up:  Jaxson Bazzi MD  [address] [facility]

## 2021-03-15 NOTE — TELEPHONE ENCOUNTER
OB History     T2    L2    SAB2  TAB0  Ectopic0  Multiple0  Live Births2   8w1d    Patient name and  verified. Patient complaining of pink-red spotting and abdominal pain 6/10.  Patient states bleeding began 1 day ago, does not saturate a

## 2021-03-15 NOTE — TELEPHONE ENCOUNTER
ASJ in office and recommended stat hold and call or return to ED for ultrasound. Patient informed of ASJ recommendations and will go to ED for evaluation and ultrasound. Advised patient to keep appointment with ASJ for tomorrow for ED f/u.  Patient Telma Loges

## 2021-03-16 ENCOUNTER — OFFICE VISIT (OUTPATIENT)
Dept: OBGYN CLINIC | Facility: CLINIC | Age: 26
End: 2021-03-16

## 2021-03-16 VITALS — DIASTOLIC BLOOD PRESSURE: 78 MMHG | SYSTOLIC BLOOD PRESSURE: 122 MMHG | BODY MASS INDEX: 39 KG/M2 | WEIGHT: 222 LBS

## 2021-03-16 DIAGNOSIS — N96 HISTORY OF MULTIPLE MISCARRIAGES: ICD-10-CM

## 2021-03-16 DIAGNOSIS — N93.9 VAGINAL BLEEDING: ICD-10-CM

## 2021-03-16 DIAGNOSIS — O20.0 THREATENED ABORTION IN FIRST TRIMESTER: Primary | ICD-10-CM

## 2021-03-16 PROCEDURE — 3074F SYST BP LT 130 MM HG: CPT | Performed by: OBSTETRICS & GYNECOLOGY

## 2021-03-16 PROCEDURE — 3078F DIAST BP <80 MM HG: CPT | Performed by: OBSTETRICS & GYNECOLOGY

## 2021-03-16 PROCEDURE — 99214 OFFICE O/P EST MOD 30 MIN: CPT | Performed by: OBSTETRICS & GYNECOLOGY

## 2021-03-16 NOTE — PROGRESS NOTES
HPI:   Jesus Rasheed is a 31year old female who presents for an ER f/u for vaginal spotting/bleeding, pt noted to have ultrasound in er which is suggestive of blighted ovum/no clear fetal pole/viability.  Hx of 2 prior miscarriages first trimest of pregnancy     nvsd   • Miscarriage     per NG: D&C   • Miscarriage within last 12 months     7 months ago   • SAB (spontaneous )       Past Surgical History:   Procedure Laterality Date   • APPENDECTOMY     • APPENDECTOMY     • C COMPARISON: [address] [facility] LESS THAN 14 WEEKS (TRANSABDOMINAL/TRANSVAGINAL) (C, 3/08/2021, 3:14 AM.       INDICATIONS: Early pregnancy.  Follow-up  previous US on 3/8, +vaginal bleeding       TECHNIQUE: Transabdominal and endova 3/15/21  1:16 PM   Hcg Quantitative   <=3.0 mIU/mL 41,599.0High       Component   Ref Range & Units 3/8/21  2:20 AM   Hcg Quantitative   <=3.0 mIU/mL 42,039.2UWWM           ASSESSMENT AND PLAN:   Pietro Coelho is a 31year old female who present

## 2021-03-22 ENCOUNTER — OFFICE VISIT (OUTPATIENT)
Dept: OBGYN CLINIC | Facility: CLINIC | Age: 26
End: 2021-03-22

## 2021-03-22 ENCOUNTER — TELEPHONE (OUTPATIENT)
Dept: OBGYN CLINIC | Facility: CLINIC | Age: 26
End: 2021-03-22

## 2021-03-22 VITALS — SYSTOLIC BLOOD PRESSURE: 119 MMHG | DIASTOLIC BLOOD PRESSURE: 81 MMHG

## 2021-03-22 DIAGNOSIS — O02.0 BLIGHTED OVUM: Primary | ICD-10-CM

## 2021-03-22 PROCEDURE — 3079F DIAST BP 80-89 MM HG: CPT | Performed by: OBSTETRICS & GYNECOLOGY

## 2021-03-22 PROCEDURE — 76817 TRANSVAGINAL US OBSTETRIC: CPT | Performed by: OBSTETRICS & GYNECOLOGY

## 2021-03-22 PROCEDURE — 3074F SYST BP LT 130 MM HG: CPT | Performed by: OBSTETRICS & GYNECOLOGY

## 2021-03-22 NOTE — TELEPHONE ENCOUNTER
Patient does not have insurance and the lab was requiring her to pay before they would do her test.  She would like to know if the test can be done in office. Please advise.

## 2021-03-22 NOTE — PROGRESS NOTES
HPI:   Emelina Bello is a 31year old female who presents for a followup evaluation due to possible nonviable iup/blighted ovum.       Wt Readings from Last 6 Encounters:  03/16/21 : 222 lb (100.7 kg)  03/15/21 : 222 lb 10.6 oz (101 kg)  03/08/21 male delivered by .    • REMOVAL GALLBLADDER        Family History   Problem Relation Age of Onset   • Diabetes Father    • Heart Disorder Father    • Heart Attack Father    • Hypertension Paternal Grandmother       Social History:   Social History    T performed.       FINDINGS:   GESTATIONAL SAC: Intrauterine gestational sac visualized.  No subchorionic hemorrhage.    FETAL POLE/EMBRYO: No fetal echo complex visualized   YOLK SAC: None visualized   AMNIOTIC FLUID VOLUME:  Too early to characterize   PLAC INDICATIONS: Early pregnancy.  Follow-up  previous US on 3/8, +vaginal bleeding       TECHNIQUE: Transabdominal and endovaginal imaging for obstetrical and fetal evaluation was performed.       FINDINGS:   GESTATIONAL SAC: Normal appearing single sac in Gestational sac noted today, no fetal pole noted, c/w prob blighted ovum. Pt counseled and repeat bhcg ordered, pt considering d & c, pt counseled on options, including expectant /medical/management/d & c. . pt considering d & c, pt noting she was leaning

## 2021-03-22 NOTE — TELEPHONE ENCOUNTER
Pt Name and  verified. Per pt states that she was unable to complete the bhcg as she is unable to pay for this type of exam. Pt wants to know if it is necessary to complete or if it can be done in office.  Advised that unfortunately this is not done i

## 2021-03-23 NOTE — TELEPHONE ENCOUNTER
Please inform, as an alternative,  we can check another ultrasound in radiology or at our office 1 week from her last ultrasound,  and if no viable pregnancy noted, then that information will be important.

## 2021-03-23 NOTE — TELEPHONE ENCOUNTER
Pt Name and  verified. Pt informed of ASJ's rec. No apts available with AS next week in office and pt unable to pay out of pocket for US with radiology.  Pt states that she will be getting a temporary card soon and would like to complete lab work onc

## 2021-03-26 ENCOUNTER — LAB ENCOUNTER (OUTPATIENT)
Dept: LAB | Facility: HOSPITAL | Age: 26
End: 2021-03-26
Attending: OBSTETRICS & GYNECOLOGY
Payer: MEDICAID

## 2021-03-26 DIAGNOSIS — N93.9 VAGINAL BLEEDING: ICD-10-CM

## 2021-03-26 LAB — B-HCG SERPL-ACNC: ABNORMAL MIU/ML

## 2021-03-26 PROCEDURE — 84702 CHORIONIC GONADOTROPIN TEST: CPT

## 2021-03-26 PROCEDURE — 36415 COLL VENOUS BLD VENIPUNCTURE: CPT

## 2021-03-29 ENCOUNTER — TELEPHONE (OUTPATIENT)
Dept: OBGYN CLINIC | Facility: CLINIC | Age: 26
End: 2021-03-29

## 2021-03-29 NOTE — TELEPHONE ENCOUNTER
[place] phone line  #[phone] used to translate phone call. Voicemail full. Could not leave a message.      ---- Message from Jason Yan MD sent at 3/28/2021  6:07 PM CDT -----  Bhcg is rising,  pt needs f/u in office for further evaluatio

## 2021-03-30 ENCOUNTER — OFFICE VISIT (OUTPATIENT)
Dept: OBGYN CLINIC | Facility: CLINIC | Age: 26
End: 2021-03-30
Payer: MEDICAID

## 2021-03-30 VITALS — SYSTOLIC BLOOD PRESSURE: 121 MMHG | DIASTOLIC BLOOD PRESSURE: 82 MMHG

## 2021-03-30 DIAGNOSIS — N92.6 MISSED MENSES: Primary | ICD-10-CM

## 2021-03-30 PROBLEM — B37.31 VULVOVAGINITIS DUE TO YEAST: Status: RESOLVED | Noted: 2019-08-07 | Resolved: 2021-03-30

## 2021-03-30 PROBLEM — Z97.5 IUD CONTRACEPTION: Status: RESOLVED | Noted: 2020-04-24 | Resolved: 2021-03-30

## 2021-03-30 PROBLEM — Z30.431 IUD CHECK UP: Status: RESOLVED | Noted: 2019-08-07 | Resolved: 2021-03-30

## 2021-03-30 PROBLEM — B37.3 VULVOVAGINITIS DUE TO YEAST: Status: RESOLVED | Noted: 2019-08-07 | Resolved: 2021-03-30

## 2021-03-30 PROCEDURE — 76817 TRANSVAGINAL US OBSTETRIC: CPT | Performed by: OBSTETRICS & GYNECOLOGY

## 2021-03-30 PROCEDURE — 3079F DIAST BP 80-89 MM HG: CPT | Performed by: OBSTETRICS & GYNECOLOGY

## 2021-03-30 PROCEDURE — 3074F SYST BP LT 130 MM HG: CPT | Performed by: OBSTETRICS & GYNECOLOGY

## 2021-03-30 PROCEDURE — 99212 OFFICE O/P EST SF 10 MIN: CPT | Performed by: OBSTETRICS & GYNECOLOGY

## 2021-03-30 NOTE — PROGRESS NOTES
HPI/Subjective:   Patient ID: Emelina Bello is a 31year old female. HPI  Patient here for follow-up early OB  Patient being monitored serially for viability in pregnancy versus possible blighted ovum or missed AB.   Patient denies vaginal ble evaluation and management discussion  Orders Placed This Encounter      HCG, Beta Subunit (Quant Pregnancy Test)      Meds This Visit:  Requested Prescriptions      No prescriptions requested or ordered in this encounter       Imaging & Referrals:  None

## 2021-04-02 ENCOUNTER — TELEPHONE (OUTPATIENT)
Dept: OBGYN CLINIC | Facility: CLINIC | Age: 26
End: 2021-04-02

## 2021-04-02 ENCOUNTER — LAB ENCOUNTER (OUTPATIENT)
Dept: LAB | Facility: HOSPITAL | Age: 26
End: 2021-04-02
Attending: OBSTETRICS & GYNECOLOGY
Payer: MEDICAID

## 2021-04-02 DIAGNOSIS — N92.6 MISSED MENSES: ICD-10-CM

## 2021-04-02 PROCEDURE — 84702 CHORIONIC GONADOTROPIN TEST: CPT

## 2021-04-02 PROCEDURE — 36415 COLL VENOUS BLD VENIPUNCTURE: CPT

## 2021-04-02 NOTE — TELEPHONE ENCOUNTER
Pt Name and  verified. OB History     T2    L2    SAB2  TAB0  Ectopic0  Multiple0  Live Births2     Pt is very early in pregnancy and was advised that there is a possibility of a missed ab.  Pt states that she is not bleeding but has been

## 2021-04-02 NOTE — TELEPHONE ENCOUNTER
Pt is told she is going to have a miscarriage. She is having really bad cramping. No bleeding yet  She would like to talk with someone about it. Please advise.

## 2021-04-03 ENCOUNTER — TELEPHONE (OUTPATIENT)
Dept: OBGYN CLINIC | Facility: CLINIC | Age: 26
End: 2021-04-03

## 2021-04-03 NOTE — TELEPHONE ENCOUNTER
Voicemail full. Unable to leave a message. ----- Message from Cari Crespo MD sent at 4/3/2021 10:32 AM CDT -----  Please inform patient that her quantitative beta-hCG level has stopped rising.   This together with her ultrasound findings are consist

## 2021-04-05 NOTE — TELEPHONE ENCOUNTER
Please contact patient in notify her that she should make an appointment in the office either later today or tomorrow with Dr. Maribell Nunez or myself if Dr. Maribell Nunez is not available to discuss and schedule D&C.

## 2021-04-05 NOTE — TELEPHONE ENCOUNTER
Patient name and  verified. Patient informed of JJF note and recommendations. Patient would like to wait for 2021 appointment with ASJ to speak about D&C.

## 2021-04-05 NOTE — TELEPHONE ENCOUNTER
Pt Name and  verified. Pt informed of AJB's recommendation. Pt states that she has not had any bleeding or discharge, has only been cramping. Pt would like to go through a D&C which she states had discussed with ASJ in previous apt.  Pt would like for

## 2021-04-06 ENCOUNTER — TELEPHONE (OUTPATIENT)
Dept: OBGYN CLINIC | Facility: CLINIC | Age: 26
End: 2021-04-06

## 2021-04-06 NOTE — TELEPHONE ENCOUNTER
Per pt received message that her upcoming apt was cancelled. Pt informed, it was only changed, not cancelled. Pt requested if she could be seen sooner by ASJ or other provider because she didn't want to wait 2 more days to be seen .  Still isn't having any

## 2021-04-07 ENCOUNTER — OFFICE VISIT (OUTPATIENT)
Dept: OBGYN CLINIC | Facility: CLINIC | Age: 26
End: 2021-04-07
Payer: MEDICAID

## 2021-04-07 ENCOUNTER — HOSPITAL ENCOUNTER (OUTPATIENT)
Dept: ULTRASOUND IMAGING | Age: 26
Discharge: HOME | End: 2021-04-07
Attending: OBSTETRICS & GYNECOLOGY
Payer: MEDICAID

## 2021-04-07 VITALS — SYSTOLIC BLOOD PRESSURE: 102 MMHG | DIASTOLIC BLOOD PRESSURE: 70 MMHG

## 2021-04-07 DIAGNOSIS — O02.1 MISSED ABORTION: Primary | ICD-10-CM

## 2021-04-07 DIAGNOSIS — O02.1 MISSED ABORTION: ICD-10-CM

## 2021-04-07 PROCEDURE — 76817 TRANSVAGINAL US OBSTETRIC: CPT | Performed by: OBSTETRICS & GYNECOLOGY

## 2021-04-07 PROCEDURE — 3074F SYST BP LT 130 MM HG: CPT | Performed by: OBSTETRICS & GYNECOLOGY

## 2021-04-07 PROCEDURE — 3078F DIAST BP <80 MM HG: CPT | Performed by: OBSTETRICS & GYNECOLOGY

## 2021-04-07 PROCEDURE — 76801 OB US < 14 WKS SINGLE FETUS: CPT | Performed by: OBSTETRICS & GYNECOLOGY

## 2021-04-07 PROCEDURE — 99214 OFFICE O/P EST MOD 30 MIN: CPT | Performed by: OBSTETRICS & GYNECOLOGY

## 2021-04-08 ENCOUNTER — TELEPHONE (OUTPATIENT)
Dept: OBGYN CLINIC | Facility: CLINIC | Age: 26
End: 2021-04-08

## 2021-04-08 ENCOUNTER — ANESTHESIA (OUTPATIENT)
Dept: SURGERY | Facility: HOSPITAL | Age: 26
End: 2021-04-08
Payer: MEDICAID

## 2021-04-08 ENCOUNTER — ANESTHESIA EVENT (OUTPATIENT)
Dept: SURGERY | Facility: HOSPITAL | Age: 26
End: 2021-04-08
Payer: MEDICAID

## 2021-04-08 ENCOUNTER — HOSPITAL ENCOUNTER (OUTPATIENT)
Facility: HOSPITAL | Age: 26
Setting detail: HOSPITAL OUTPATIENT SURGERY
Discharge: HOME | End: 2021-04-08
Attending: OBSTETRICS & GYNECOLOGY | Admitting: OBSTETRICS & GYNECOLOGY
Payer: MEDICAID

## 2021-04-08 VITALS
WEIGHT: 220 LBS | BODY MASS INDEX: 38.98 KG/M2 | TEMPERATURE: 98 F | HEIGHT: 63 IN | RESPIRATION RATE: 16 BRPM | HEART RATE: 72 BPM | SYSTOLIC BLOOD PRESSURE: 116 MMHG | DIASTOLIC BLOOD PRESSURE: 52 MMHG | OXYGEN SATURATION: 98 %

## 2021-04-08 DIAGNOSIS — O02.1 MISSED AB: ICD-10-CM

## 2021-04-08 PROCEDURE — 10D17ZZ EXTRACTION OF PRODUCTS OF CONCEPTION, RETAINED, VIA NATURAL OR ARTIFICIAL OPENING: ICD-10-PCS | Performed by: OBSTETRICS & GYNECOLOGY

## 2021-04-08 PROCEDURE — 59820 CARE OF MISCARRIAGE: CPT | Performed by: OBSTETRICS & GYNECOLOGY

## 2021-04-08 RX ORDER — ONDANSETRON 2 MG/ML
4 INJECTION INTRAMUSCULAR; INTRAVENOUS ONCE AS NEEDED
Status: DISCONTINUED | OUTPATIENT
Start: 2021-04-08 | End: 2021-04-08

## 2021-04-08 RX ORDER — SODIUM CHLORIDE, SODIUM LACTATE, POTASSIUM CHLORIDE, CALCIUM CHLORIDE 600; 310; 30; 20 MG/100ML; MG/100ML; MG/100ML; MG/100ML
INJECTION, SOLUTION INTRAVENOUS CONTINUOUS
Status: DISCONTINUED | OUTPATIENT
Start: 2021-04-08 | End: 2021-04-08

## 2021-04-08 RX ORDER — MORPHINE SULFATE 4 MG/ML
2 INJECTION, SOLUTION INTRAMUSCULAR; INTRAVENOUS EVERY 10 MIN PRN
Status: DISCONTINUED | OUTPATIENT
Start: 2021-04-08 | End: 2021-04-08

## 2021-04-08 RX ORDER — NALOXONE HYDROCHLORIDE 0.4 MG/ML
80 INJECTION, SOLUTION INTRAMUSCULAR; INTRAVENOUS; SUBCUTANEOUS AS NEEDED
Status: DISCONTINUED | OUTPATIENT
Start: 2021-04-08 | End: 2021-04-08

## 2021-04-08 RX ORDER — HYDROMORPHONE HYDROCHLORIDE 1 MG/ML
0.2 INJECTION, SOLUTION INTRAMUSCULAR; INTRAVENOUS; SUBCUTANEOUS EVERY 5 MIN PRN
Status: DISCONTINUED | OUTPATIENT
Start: 2021-04-08 | End: 2021-04-08

## 2021-04-08 RX ORDER — KETOROLAC TROMETHAMINE 30 MG/ML
INJECTION, SOLUTION INTRAMUSCULAR; INTRAVENOUS AS NEEDED
Status: DISCONTINUED | OUTPATIENT
Start: 2021-04-08 | End: 2021-04-08 | Stop reason: SURG

## 2021-04-08 RX ORDER — PROCHLORPERAZINE EDISYLATE 5 MG/ML
5 INJECTION INTRAMUSCULAR; INTRAVENOUS ONCE AS NEEDED
Status: DISCONTINUED | OUTPATIENT
Start: 2021-04-08 | End: 2021-04-08

## 2021-04-08 RX ORDER — ONDANSETRON 2 MG/ML
INJECTION INTRAMUSCULAR; INTRAVENOUS AS NEEDED
Status: DISCONTINUED | OUTPATIENT
Start: 2021-04-08 | End: 2021-04-08 | Stop reason: SURG

## 2021-04-08 RX ORDER — MIDAZOLAM HYDROCHLORIDE 1 MG/ML
INJECTION INTRAMUSCULAR; INTRAVENOUS AS NEEDED
Status: DISCONTINUED | OUTPATIENT
Start: 2021-04-08 | End: 2021-04-08 | Stop reason: SURG

## 2021-04-08 RX ORDER — HALOPERIDOL 5 MG/ML
0.25 INJECTION INTRAMUSCULAR ONCE AS NEEDED
Status: DISCONTINUED | OUTPATIENT
Start: 2021-04-08 | End: 2021-04-08

## 2021-04-08 RX ORDER — ACETAMINOPHEN 500 MG
1000 TABLET ORAL ONCE
Status: COMPLETED | OUTPATIENT
Start: 2021-04-08 | End: 2021-04-08

## 2021-04-08 RX ORDER — DOXYCYCLINE HYCLATE 100 MG
100 TABLET ORAL 2 TIMES DAILY
Qty: 15 TABLET | Refills: 0 | Status: SHIPPED | OUTPATIENT
Start: 2021-04-08 | End: 2021-04-15

## 2021-04-08 RX ORDER — DEXAMETHASONE SODIUM PHOSPHATE 4 MG/ML
VIAL (ML) INJECTION AS NEEDED
Status: DISCONTINUED | OUTPATIENT
Start: 2021-04-08 | End: 2021-04-08 | Stop reason: SURG

## 2021-04-08 RX ORDER — HYDROCODONE BITARTRATE AND ACETAMINOPHEN 5; 325 MG/1; MG/1
1 TABLET ORAL AS NEEDED
Status: COMPLETED | OUTPATIENT
Start: 2021-04-08 | End: 2021-04-08

## 2021-04-08 RX ORDER — HYDROCODONE BITARTRATE AND ACETAMINOPHEN 5; 325 MG/1; MG/1
2 TABLET ORAL AS NEEDED
Status: COMPLETED | OUTPATIENT
Start: 2021-04-08 | End: 2021-04-08

## 2021-04-08 RX ORDER — METOCLOPRAMIDE 10 MG/1
10 TABLET ORAL ONCE
Status: COMPLETED | OUTPATIENT
Start: 2021-04-08 | End: 2021-04-08

## 2021-04-08 RX ORDER — FAMOTIDINE 20 MG/1
20 TABLET ORAL ONCE
Status: COMPLETED | OUTPATIENT
Start: 2021-04-08 | End: 2021-04-08

## 2021-04-08 RX ORDER — HYDROMORPHONE HYDROCHLORIDE 1 MG/ML
0.6 INJECTION, SOLUTION INTRAMUSCULAR; INTRAVENOUS; SUBCUTANEOUS EVERY 5 MIN PRN
Status: DISCONTINUED | OUTPATIENT
Start: 2021-04-08 | End: 2021-04-08

## 2021-04-08 RX ORDER — HYDROMORPHONE HYDROCHLORIDE 1 MG/ML
0.4 INJECTION, SOLUTION INTRAMUSCULAR; INTRAVENOUS; SUBCUTANEOUS EVERY 5 MIN PRN
Status: DISCONTINUED | OUTPATIENT
Start: 2021-04-08 | End: 2021-04-08

## 2021-04-08 RX ORDER — OXYTOCIN 10 [USP'U]/ML
INJECTION, SOLUTION INTRAMUSCULAR; INTRAVENOUS AS NEEDED
Status: DISCONTINUED | OUTPATIENT
Start: 2021-04-08 | End: 2021-04-08 | Stop reason: SURG

## 2021-04-08 RX ORDER — MORPHINE SULFATE 10 MG/ML
6 INJECTION, SOLUTION INTRAMUSCULAR; INTRAVENOUS EVERY 10 MIN PRN
Status: DISCONTINUED | OUTPATIENT
Start: 2021-04-08 | End: 2021-04-08

## 2021-04-08 RX ORDER — MORPHINE SULFATE 4 MG/ML
4 INJECTION, SOLUTION INTRAMUSCULAR; INTRAVENOUS EVERY 10 MIN PRN
Status: DISCONTINUED | OUTPATIENT
Start: 2021-04-08 | End: 2021-04-08

## 2021-04-08 RX ORDER — METHYLERGONOVINE MALEATE 0.2 MG/ML
INJECTION INTRAVENOUS AS NEEDED
Status: DISCONTINUED | OUTPATIENT
Start: 2021-04-08 | End: 2021-04-08 | Stop reason: SURG

## 2021-04-08 RX ADMIN — ONDANSETRON 4 MG: 2 INJECTION INTRAMUSCULAR; INTRAVENOUS at 09:15:00

## 2021-04-08 RX ADMIN — SODIUM CHLORIDE, SODIUM LACTATE, POTASSIUM CHLORIDE, CALCIUM CHLORIDE: 600; 310; 30; 20 INJECTION, SOLUTION INTRAVENOUS at 09:12:00

## 2021-04-08 RX ADMIN — METHYLERGONOVINE MALEATE 0.2 MG: 0.2 INJECTION INTRAVENOUS at 09:38:00

## 2021-04-08 RX ADMIN — MIDAZOLAM HYDROCHLORIDE 2 MG: 1 INJECTION INTRAMUSCULAR; INTRAVENOUS at 09:13:00

## 2021-04-08 RX ADMIN — OXYTOCIN 10 UNITS: 10 INJECTION, SOLUTION INTRAMUSCULAR; INTRAVENOUS at 09:36:00

## 2021-04-08 RX ADMIN — DEXAMETHASONE SODIUM PHOSPHATE 4 MG: 4 MG/ML VIAL (ML) INJECTION at 09:15:00

## 2021-04-08 RX ADMIN — KETOROLAC TROMETHAMINE 30 MG: 30 INJECTION, SOLUTION INTRAMUSCULAR; INTRAVENOUS at 09:35:00

## 2021-04-08 RX ADMIN — SODIUM CHLORIDE, SODIUM LACTATE, POTASSIUM CHLORIDE, CALCIUM CHLORIDE: 600; 310; 30; 20 INJECTION, SOLUTION INTRAVENOUS at 09:57:00

## 2021-04-08 NOTE — ANESTHESIA PREPROCEDURE EVALUATION
Anesthesia PreOp Note    HPI:     Staci Miller is a 31year old female who presents for preoperative consultation requested by:  Bart Lenz MD    Date of Surgery: 4/8/2021    Procedure(s):  SUCTION DILATION & CURETTAGE  Indication: Mis , Rfl: , Past Month at Unknown time  Cholecalciferol (VITAMIN D3) 1.25 MG (58757 UT) Oral Cap, Take 1 tablet by mouth once a week., Disp: 4 capsule, Rfl: 4, Past Month at Unknown time  cetirizine 10 MG Oral Tab, Take 1 tablet (10 mg total) by mouth daily. file    Social Determinants of Health  Financial Resource Strain:       Difficulty of Paying Living Expenses:   Food Insecurity:       Worried About Running Out of Food in the Last Year:       Ran Out of Food in the Last Year:   Transportation Needs:       3\")         Anesthesia Evaluation     Patient summary reviewed and Nursing notes reviewed    Airway   Mallampati: I  Dental - normal exam     Pulmonary - negative ROS and normal exam   Cardiovascular - normal exam  Exercise tolerance: good    NYHA Classif

## 2021-04-08 NOTE — H&P
HPI:   Robert Do is a 31year old female who presents for a hx of missed ab/blighted ovum. Pt was recently seen in the office by dr Josselin Mathis and noted this, and ultrasound at [facility] noted this as well. [facility] most recently noted to drop.   Pt was c • CHOLECYSTECTOMY     • D & C     • D & C      per NG:Unknown sex; 8 week   •   2014    per Nnd degree perineal laceration. Lynda Parson was a full term live born 7 pound 15 ounce male delivered by .    • REMOVAL GALLBLADDER COMPARISON: [facility] LESS THAN 14 WEEKS (TRANSABDOMINAL/TRANSVAGINAL) (C, 3/15/2021, 1:44 PM.       INDICATIONS: O02.1 Missed        TECHNIQUE: Transabdominal and endovaginal pelvic ultrasound examinations were per noted this as well. [facility] most recently noted to drop. Pt declined any observation or follow up .    Pt was counseled on options of management and pt does not want to have a miscarriage at home and wants a suction dilatation and curretage, with the risks/be

## 2021-04-08 NOTE — ANESTHESIA PROCEDURE NOTES
Airway  Date/Time: 4/8/2021 9:15 AM  Urgency: Elective    Airway not difficult    General Information and Staff    Patient location during procedure: OR  Anesthesiologist: Walt Park MD  Resident/CRNA: Filipe Crespo CRNA  Performed: CRNA and an

## 2021-04-08 NOTE — ANESTHESIA POSTPROCEDURE EVALUATION
Patient: Tiffany Tam    Procedure Summary     Date: 04/08/21 Room / Location: [address] [place] [address] MAIN OR 03 / [address] [place] [address] OR    Anesthesia Start: 5931 Anesthesia Stop:     Procedure: SUCTION DILATION & CURETTAGE (N/A Vagina ) Diagnosis:       Missed ab      (Mi

## 2021-04-12 NOTE — PROGRESS NOTES
HPI:   Jesus Rasheed is a 31year old female who presents for a hx of missed ab/fetal demise. Pt was being followed with ultrasounds and bhcg. Reviewed ultrasounds and labs. Most recentl bhcg started dropping c/w fetal demise/missed ab.   Pt w perineal laceration. Migdalia Martinez was a full term live born 7 pound 15 ounce male delivered by .    • REMOVAL GALLBLADDER        Family History   Problem Relation Age of Onset   • Diabetes Father    • Heart Disorder Father    • Heart Attack Father    • Hyp hCG level 11,967       TECHNIQUE: Transabdominal and endovaginal imaging for obstetrical and fetal evaluation was performed.       FINDINGS:   GESTATIONAL SAC: Intrauterine gestational sac visualized.  No subchorionic hemorrhage.    FETAL POLE/EMBRYO: No fe on 3/8, +vaginal bleeding       TECHNIQUE: Transabdominal and endovaginal imaging for obstetrical and fetal evaluation was performed.       FINDINGS:   GESTATIONAL SAC: Normal appearing single sac in the upper uterus.  Small subchorionic hemorrhage measure Missed        TECHNIQUE: Transabdominal and endovaginal pelvic ultrasound examinations were performed.  A transvaginal scan was performed.       FINDINGS:       GESTATIONAL SAC: Irregular appearing gestational sac seen within the endometrium. Devendra Santo clinical findings.      Negative      <= 3.0    mIU/mL   Indeterminate 3.1-50.0  mIU/mL (Repeat suggested in 24- 48 hours)   Positive:   Ranges with normal pregnancies:   0.2-1 week  5.0- 50.0       mIU/mL   1- 2 weeks  50.0-500.0      mIU/mL   2- 3 weeks   options of management, pt is requesting a suction d& c, all questions answered. Risks/benefits/alternatives counseled. All questions answered.

## 2021-04-15 ENCOUNTER — OFFICE VISIT (OUTPATIENT)
Dept: OBGYN CLINIC | Facility: CLINIC | Age: 26
End: 2021-04-15
Payer: MEDICAID

## 2021-04-15 VITALS — DIASTOLIC BLOOD PRESSURE: 70 MMHG | SYSTOLIC BLOOD PRESSURE: 100 MMHG | WEIGHT: 221.63 LBS | BODY MASS INDEX: 39 KG/M2

## 2021-04-15 DIAGNOSIS — O02.1 MISSED ABORTION: ICD-10-CM

## 2021-04-15 DIAGNOSIS — Z30.09 ENCOUNTER FOR COUNSELING REGARDING CONTRACEPTION: Primary | ICD-10-CM

## 2021-04-15 PROCEDURE — 99213 OFFICE O/P EST LOW 20 MIN: CPT | Performed by: OBSTETRICS & GYNECOLOGY

## 2021-04-15 PROCEDURE — 3078F DIAST BP <80 MM HG: CPT | Performed by: OBSTETRICS & GYNECOLOGY

## 2021-04-15 PROCEDURE — 99024 POSTOP FOLLOW-UP VISIT: CPT | Performed by: OBSTETRICS & GYNECOLOGY

## 2021-04-15 PROCEDURE — 3074F SYST BP LT 130 MM HG: CPT | Performed by: OBSTETRICS & GYNECOLOGY

## 2021-04-15 RX ORDER — DOXYCYCLINE HYCLATE 100 MG
100 TABLET ORAL 2 TIMES DAILY
Qty: 8 TABLET | Refills: 0 | Status: SHIPPED | OUTPATIENT
Start: 2021-04-15 | End: 2021-04-19

## 2021-04-15 NOTE — PROGRESS NOTES
HPI:   Charley Doe is a 31year old female who presents for a hx of missed ab, s/p suction d & c.  Pt doing well. No pain, small amount of spotting after procedure.         Wt Readings from Last 6 Encounters:  04/15/21 : 221 lb 9.6 oz (100.5 Onset   • Diabetes Father    • Heart Disorder Father    • Heart Attack Father    • Hypertension Paternal Grandmother    • No Known Problems Mother       Social History:   Social History    Tobacco Use      Smoking status: Never Smoker      Smokeless tobacc Pt was taking her doxy one tab qday, still has 6 pills in bottle, rx for 4 more days sent, in order for rx to be taken correctly. Counseled pt on contraception,  Options of contraception counseled, pt plans condoms at this time.   All questions answered

## 2021-04-20 PROBLEM — Z30.09 ENCOUNTER FOR COUNSELING REGARDING CONTRACEPTION: Status: ACTIVE | Noted: 2021-04-20

## 2021-04-22 ENCOUNTER — TELEPHONE (OUTPATIENT)
Dept: OBGYN UNIT | Facility: HOSPITAL | Age: 26
End: 2021-04-22

## 2021-05-08 NOTE — OPERATIVE REPORT
[facility]    PATIENT'S NAME: JORGITO Fu   ATTENDING PHYSICIAN: James Davis MD   OPERATING PHYSICIAN: Ashok Claude A. Vicenta Buba, MD   PATIENT ACCOUNT#:   [de-identified]    LOCATION:  [address]  MEDICAL RECORD #:   [id number] curettage was then performed using a #7 curved suction curette, yielding a moderate amount of products of conception. Good uterine cry was noted in all quadrants after this. All counts were correct.   Single-tooth tenaculum was removed from the anterior l

## 2021-07-08 ENCOUNTER — TELEPHONE (OUTPATIENT)
Dept: OBGYN CLINIC | Facility: CLINIC | Age: 26
End: 2021-07-08

## 2021-07-08 NOTE — TELEPHONE ENCOUNTER
Pt had D&C in April, then got pregnant, bleeding for 2-weeks now brown, cramping. Had [facility] and saw someone for the issue last month. But now the other doctor won't see her because she now has Yarelis.     Please advise if able to get

## 2021-07-12 NOTE — TELEPHONE ENCOUNTER
Pt reporting after her D&C in April she got pregnant but needed to initiate PN care with other provider because her insurance changed to out of network plan. Pt has been seen twice in the ER this pregnancy due to heavy bleeding and passing clots.  Pt though

## 2021-07-12 NOTE — TELEPHONE ENCOUNTER
Pt. Is returning the nurses call. Pt. States that she is 11 weeks pregnant, and that her previous Ob/Gyne is not able to see her anymore, as she has new inusr. [facility].

## 2021-07-12 NOTE — TELEPHONE ENCOUNTER
Pt is calling back because the nurse said she would call her back. Pt insurance changed and your old doctor can no longer see her. She is asking if the nurses have reviewed her info so she can continue coming in with her existing appointment on 7/27.

## 2021-07-13 NOTE — TELEPHONE ENCOUNTER
Spoke with patient and informed ROSELIA was sent to providers office for medical records. This RN confirmed with [facility]  staff [place] fax number was listed on ROSELIA form.

## 2021-07-13 NOTE — TELEPHONE ENCOUNTER
Patient name and  verified. Contacted patient for sooner PN appt. Patient mentions she is considered \"high risk\" due to her vaginal bleeding and placenta that is \"low\" with a lot of cramping per patient.  Advised patient to submit PN records for

## 2021-07-16 ENCOUNTER — TELEPHONE (OUTPATIENT)
Dept: OBGYN CLINIC | Facility: CLINIC | Age: 26
End: 2021-07-16

## 2021-07-16 NOTE — TELEPHONE ENCOUNTER
OB History     T2    L2    SAB2  TAB0  Ectopic0  Multiple0  Live Births2   Patient name and  verified. Patient contacted office to follow up on medical records that were  to be faxed from current OB office.  Patient mentioned again she is

## 2021-07-19 NOTE — TELEPHONE ENCOUNTER
Received fax with pt's [address]. Placed on ASJ's desk for review.

## 2021-07-19 NOTE — TELEPHONE ENCOUNTER
Pt Name and  verified. Pt calling in regards to being accepted to practice. Advised that US information was just received this morning and the provider will have to review them prior to making a decision on seeing pt.  Pt voiced understanding and stat

## 2021-07-20 NOTE — TELEPHONE ENCOUNTER
Records reviewed by DAVID in office. Gave verbal approval to this RN to schedule patient for PN appt and ultrasound in [facility]. Patient scheduled by  staff on 7/21/2021.

## 2021-07-21 NOTE — TELEPHONE ENCOUNTER
Ultrasound dept contacted to cancel patient's appt. Patient scheduled for tomorrow with ASJ for in office ob1 ultrasound. No further questions.

## 2021-07-21 NOTE — TELEPHONE ENCOUNTER
Per Dr Kami Patel, patient is scheduled to have an u/s in [place] ultrasound dept due to no vaginal probe ultrasound in [place] ob/gyne office.

## 2021-07-22 ENCOUNTER — TELEPHONE (OUTPATIENT)
Dept: PERINATAL CARE | Facility: HOSPITAL | Age: 26
End: 2021-07-22

## 2021-07-22 ENCOUNTER — OFFICE VISIT (OUTPATIENT)
Dept: OBGYN CLINIC | Facility: CLINIC | Age: 26
End: 2021-07-22
Payer: MEDICAID

## 2021-07-22 VITALS — DIASTOLIC BLOOD PRESSURE: 72 MMHG | SYSTOLIC BLOOD PRESSURE: 124 MMHG

## 2021-07-22 DIAGNOSIS — O20.0 THREATENED ABORTION, ANTEPARTUM: ICD-10-CM

## 2021-07-22 DIAGNOSIS — N93.9 VAGINAL SPOTTING: ICD-10-CM

## 2021-07-22 DIAGNOSIS — N92.6 MISSED MENSES: Primary | ICD-10-CM

## 2021-07-22 LAB
CONTROL LINE PRESENT WITH A CLEAR BACKGROUND (YES/NO): YES YES/NO
KIT LOT #: NORMAL NUMERIC
PREGNANCY TEST, URINE: POSITIVE

## 2021-07-22 PROCEDURE — 99214 OFFICE O/P EST MOD 30 MIN: CPT | Performed by: OBSTETRICS & GYNECOLOGY

## 2021-07-22 PROCEDURE — 81025 URINE PREGNANCY TEST: CPT | Performed by: OBSTETRICS & GYNECOLOGY

## 2021-07-22 PROCEDURE — 3074F SYST BP LT 130 MM HG: CPT | Performed by: OBSTETRICS & GYNECOLOGY

## 2021-07-22 PROCEDURE — 3078F DIAST BP <80 MM HG: CPT | Performed by: OBSTETRICS & GYNECOLOGY

## 2021-07-22 NOTE — TELEPHONE ENCOUNTER
Primary Diagnosis Code: N92.6 Description: Irregular menstruation, unspecified  Secondary Diagnosis Code: O20.0 Description: Threatened   Date of Service: Not provided    CPT Code: 87771/77750 Description: Ultrasound, pregnant uterus  Case Number:

## 2021-07-22 NOTE — TELEPHONE ENCOUNTER
PATIENT SCHEDULE 7/26/21 FOR FTS AND REQUIRES PRIOR AUTHORIZATION. [id number].  Jesús  PSR AT [place] [id number]

## 2021-07-23 ENCOUNTER — TELEPHONE (OUTPATIENT)
Dept: PERINATAL CARE | Facility: HOSPITAL | Age: 26
End: 2021-07-23

## 2021-07-23 NOTE — TELEPHONE ENCOUNTER
Case still pending. Additional documentation added to patient's case.  Routing to [facility]

## 2021-07-23 NOTE — TELEPHONE ENCOUNTER
PA not approved for Monday7/26 visit   Spoke with Lois Perez RN at [facility] office  Agreed to reschedule pt pending PA approval  Pt contacted states understanding   appointment rescheduled to 7/29 3:30

## 2021-07-26 ENCOUNTER — TELEPHONE (OUTPATIENT)
Dept: PERINATAL CARE | Facility: HOSPITAL | Age: 26
End: 2021-07-26

## 2021-07-26 ENCOUNTER — TELEPHONE (OUTPATIENT)
Dept: OBGYN CLINIC | Facility: CLINIC | Age: 26
End: 2021-07-26

## 2021-07-26 PROBLEM — E66.9 OBESITY, CLASS II, BMI 35-39.9, ISOLATED (SEE ACTUAL BMI): Status: ACTIVE | Noted: 2021-01-01

## 2021-07-26 PROBLEM — E66.812 OBESITY, CLASS II, BMI 35-39.9, ISOLATED (SEE ACTUAL BMI): Status: ACTIVE | Noted: 2021-01-01

## 2021-07-26 NOTE — TELEPHONE ENCOUNTER
Authorization Number: [id number]  Case Number: [id number]     Status: Approved  P2P Status:   Approval Date: 7/23/2021 12:00:00 AM  Service Code: 95476/38054  Service Description: Ultrasound, pregnant uterus  Site Name: [address] [place] [address]

## 2021-07-26 NOTE — TELEPHONE ENCOUNTER
Pt Name and  verified. OB History     T2    L2    SAB2  TAB0  Ectopic0  Multiple0  Live Births2     Pt is about 12 weeks gestation and having constant HA and has taken tylenol but it only helps a little bit. Pt states that she is having a hard time sleeping as well due to the HA. Pt has not suffered from any HA in the past until she had tested positive for Covid-19 last year is when she began to suffer from HA. Pt states that the HA were not frequent but they did occur more after her testing positive. Pt states that she only took one tablet of 500mg of tylenol. Pt advised she can do no more than 3,000 mg/day of regular tylenol or tylenol extra strength. Pt advised to call office back if s/o HA do not get any better or she experiences any visual changes, or dizziness. Pt voiced understanding. Routing to on call provider for further recommendation if warranted.

## 2021-07-26 NOTE — TELEPHONE ENCOUNTER
Pt 13 weeks pregnanthad headaches last week started back this week.  Pt has been taking tylenol  Swazi speaking

## 2021-07-27 NOTE — PROGRESS NOTES
Outpatient Maternal-Fetal Medicine Consultation    Dear Dr. Dawna Martin,    Thank you for requesting ultrasound evaluation and maternal fetal medicine consultation on your patient Paz Xiao.   As you are aware she is a 31year old female with a uterine bleeding, Amenorrhea, Anemia, Anesthesia complication, Anxiety, Blood disorder, Cancer ([facility] [address] Chlamydia, Decorative tattoo, Depression, Diabetes ([facility] [address] Diabetes mellitus ([facility] [address] Difficult intubation, Dysmenorrhea, Endometriosis, Exposure to genital the patient. Single IUP with cardiac activity 167 bpm  Amniotic fluid is normal.  Placenta location is posterior  The CRL is consistent with gestational age. Nasal bone present. The nuchal translucency measures 1.6 mm. This is within normal limits. demographic variables such as age, race, employment, and socioeconomic status. Multiple births are six times as likely to be  as go births in the [place].     labor can be a complication of infections unrelated to the genital tr months. We discussed the morbidity and mortality associated with prematurity at various gestational ages. The signs and symptoms of  labor were discussed at length with her and her .   We talked about potential risks and benefits associ progesterone starting in the second trimester is very good. Subchorionic bleed -   I discussed the risks of a subchorionic hemorrhage including pregnancy loss, PROM, chronic abruption, IUGR, prematurity, infection and maternal hemorrhage.   The potenti obesity and excessive maternal weight gain before or during pregnancy contribute to an increased probability of  delivery.   It has also been hypothesized that obesity may lead to dystocia due to increased soft tissue deposition in the maternal pelv a stationary cycle for  30 minutes 3 times per week, keeping HR<140 bpm.  I encouraged Marbernard  to begin moderate exercise such as walking or stationary bike in the pregnancy.     Prevention of Preeclampsia    Antiplatelet Agents  The observation that preeclam with preeclampsia, especially early onset and with an adverse outcome   · Multifetal gestation  · Chronic hypertension   · Type 1 or 2 diabetes mellitus  · Chronic kidney disease  · Autoimmune disease (antiphospholipid syndrome, systemic lupus erythematosu which could be achieved easily by taking two 81 mg tablets or splitting a 325 mg tablet. For prevention of myocardial infarction and stroke in nonpregnant individuals, aspirin appears to be equally effective at doses between 75 and 325 mg per day.   The saf secondary hypertension or previous pregnancy-related hypertension for prevention of preeclampsia-related stroke.   · The US Preventive Services Task Force (USPSTF) recommends the use of low-dose aspirin (81 mg/day) in women at high risk of developing preecl presentation, family history of preeclampsia).   · The World Health Organization recommends the use of low-dose aspirin (75 mg/day) for high-risk women (history of preeclampsia, diabetes, chronic hypertension, renal or autoimmune disease, or multifetal preg

## 2021-07-29 ENCOUNTER — LAB ENCOUNTER (OUTPATIENT)
Dept: LAB | Facility: HOSPITAL | Age: 26
End: 2021-07-29
Attending: OBSTETRICS & GYNECOLOGY
Payer: MEDICAID

## 2021-07-29 ENCOUNTER — HOSPITAL ENCOUNTER (OUTPATIENT)
Dept: PERINATAL CARE | Facility: HOSPITAL | Age: 26
Discharge: HOME | End: 2021-07-29
Attending: OBSTETRICS & GYNECOLOGY
Payer: MEDICAID

## 2021-07-29 ENCOUNTER — NURSE ONLY (OUTPATIENT)
Dept: OBGYN CLINIC | Facility: CLINIC | Age: 26
End: 2021-07-29
Payer: MEDICAID

## 2021-07-29 VITALS
SYSTOLIC BLOOD PRESSURE: 130 MMHG | DIASTOLIC BLOOD PRESSURE: 72 MMHG | WEIGHT: 224 LBS | HEIGHT: 63 IN | BODY MASS INDEX: 39.69 KG/M2 | HEART RATE: 87 BPM

## 2021-07-29 DIAGNOSIS — Z36.9 FIRST TRIMESTER SCREENING: ICD-10-CM

## 2021-07-29 DIAGNOSIS — O20.0 THREATENED ABORTION, ANTEPARTUM: ICD-10-CM

## 2021-07-29 DIAGNOSIS — Z36.9 FIRST TRIMESTER SCREENING: Primary | ICD-10-CM

## 2021-07-29 DIAGNOSIS — O09.891 HISTORY OF PRETERM DELIVERY, CURRENTLY PREGNANT IN FIRST TRIMESTER: ICD-10-CM

## 2021-07-29 DIAGNOSIS — Z34.81 ENCOUNTER FOR SUPERVISION OF OTHER NORMAL PREGNANCY IN FIRST TRIMESTER: ICD-10-CM

## 2021-07-29 PROBLEM — O09.899 HISTORY OF PRETERM DELIVERY, CURRENTLY PREGNANT: Status: ACTIVE | Noted: 2021-07-29

## 2021-07-29 PROBLEM — O09.899 HISTORY OF PRETERM DELIVERY, CURRENTLY PREGNANT (HCC): Status: ACTIVE | Noted: 2021-07-29

## 2021-07-29 PROBLEM — Z30.09 ENCOUNTER FOR COUNSELING REGARDING CONTRACEPTION: Status: RESOLVED | Noted: 2021-04-20 | Resolved: 2021-07-29

## 2021-07-29 PROBLEM — E66.9 OBESITY, CLASS II, BMI 35-39.9, ISOLATED (SEE ACTUAL BMI): Status: RESOLVED | Noted: 2021-01-01 | Resolved: 2021-07-29

## 2021-07-29 PROBLEM — E66.812 OBESITY, CLASS II, BMI 35-39.9, ISOLATED (SEE ACTUAL BMI): Status: RESOLVED | Noted: 2021-01-01 | Resolved: 2021-07-29

## 2021-07-29 LAB
ANTIBODY SCREEN: NEGATIVE
BASOPHILS # BLD AUTO: 0.02 X10(3) UL (ref 0–0.2)
BASOPHILS NFR BLD AUTO: 0.3 %
BILIRUB UR QL: NEGATIVE
CLARITY UR: CLEAR
COLOR UR: YELLOW
DEPRECATED RDW RBC AUTO: 46.5 FL (ref 35.1–46.3)
EOSINOPHIL # BLD AUTO: 0.07 X10(3) UL (ref 0–0.7)
EOSINOPHIL NFR BLD AUTO: 1 %
ERYTHROCYTE [DISTWIDTH] IN BLOOD BY AUTOMATED COUNT: 16.9 % (ref 11–15)
GLUCOSE 1H P GLC SERPL-MCNC: 119 MG/DL
GLUCOSE UR-MCNC: NEGATIVE MG/DL
HBV SURFACE AG SER-ACNC: <0.1 [IU]/L
HBV SURFACE AG SERPL QL IA: NONREACTIVE
HCT VFR BLD AUTO: 31.8 %
HGB BLD-MCNC: 9.7 G/DL
HGB UR QL STRIP.AUTO: NEGATIVE
IMM GRANULOCYTES # BLD AUTO: 0.02 X10(3) UL (ref 0–1)
IMM GRANULOCYTES NFR BLD: 0.3 %
KETONES UR-MCNC: NEGATIVE MG/DL
LYMPHOCYTES # BLD AUTO: 1.97 X10(3) UL (ref 1–4)
LYMPHOCYTES NFR BLD AUTO: 27.7 %
MCH RBC QN AUTO: 23.3 PG (ref 26–34)
MCHC RBC AUTO-ENTMCNC: 30.5 G/DL (ref 31–37)
MCV RBC AUTO: 76.4 FL
MONOCYTES # BLD AUTO: 0.38 X10(3) UL (ref 0.1–1)
MONOCYTES NFR BLD AUTO: 5.3 %
NEUTROPHILS # BLD AUTO: 4.66 X10 (3) UL (ref 1.5–7.7)
NEUTROPHILS # BLD AUTO: 4.66 X10(3) UL (ref 1.5–7.7)
NEUTROPHILS NFR BLD AUTO: 65.4 %
NITRITE UR QL STRIP.AUTO: NEGATIVE
PH UR: 8 [PH] (ref 5–8)
PLATELET # BLD AUTO: 204 10(3)UL (ref 150–450)
PROT UR-MCNC: NEGATIVE MG/DL
RBC # BLD AUTO: 4.16 X10(6)UL
RH BLOOD TYPE: POSITIVE
RUBV IGG SER QL: POSITIVE
RUBV IGG SER-ACNC: 294.3 IU/ML (ref 10–?)
SP GR UR STRIP: 1.01 (ref 1–1.03)
TSI SER-ACNC: 0.36 MIU/ML (ref 0.36–3.74)
UROBILINOGEN UR STRIP-ACNC: <2
WBC # BLD AUTO: 7.1 X10(3) UL (ref 4–11)

## 2021-07-29 PROCEDURE — 99211 OFF/OP EST MAY X REQ PHY/QHP: CPT | Performed by: OBSTETRICS & GYNECOLOGY

## 2021-07-29 PROCEDURE — 76813 OB US NUCHAL MEAS 1 GEST: CPT | Performed by: OBSTETRICS & GYNECOLOGY

## 2021-07-29 PROCEDURE — 87086 URINE CULTURE/COLONY COUNT: CPT

## 2021-07-29 PROCEDURE — 81001 URINALYSIS AUTO W/SCOPE: CPT

## 2021-07-29 PROCEDURE — 99205 OFFICE O/P NEW HI 60 MIN: CPT | Performed by: OBSTETRICS & GYNECOLOGY

## 2021-07-29 PROCEDURE — 87340 HEPATITIS B SURFACE AG IA: CPT

## 2021-07-29 PROCEDURE — 86850 RBC ANTIBODY SCREEN: CPT

## 2021-07-29 PROCEDURE — 85025 COMPLETE CBC W/AUTO DIFF WBC: CPT

## 2021-07-29 PROCEDURE — 84443 ASSAY THYROID STIM HORMONE: CPT

## 2021-07-29 PROCEDURE — 36415 COLL VENOUS BLD VENIPUNCTURE: CPT

## 2021-07-29 PROCEDURE — 82950 GLUCOSE TEST: CPT

## 2021-07-29 PROCEDURE — 86762 RUBELLA ANTIBODY: CPT

## 2021-07-29 PROCEDURE — 87389 HIV-1 AG W/HIV-1&-2 AB AG IA: CPT

## 2021-07-29 PROCEDURE — 86900 BLOOD TYPING SEROLOGIC ABO: CPT

## 2021-07-29 PROCEDURE — 86780 TREPONEMA PALLIDUM: CPT

## 2021-07-29 PROCEDURE — 86901 BLOOD TYPING SEROLOGIC RH(D): CPT

## 2021-07-29 RX ORDER — PRENATAL VIT/IRON FUM/FOLIC AC 27MG-0.8MG
1 TABLET ORAL DAILY
COMMUNITY

## 2021-07-29 NOTE — PROGRESS NOTES
Pt here today for RN [facility] Education. Missed menses apt with:ASJ  LMP: 2021    Pre  BMI: 38.63  EPDS score: 0/30  +UPT at home:    +UPT in office: 2021    US: ultrasound done in office with DAVID for viability.  MFM ultrasound done 2021

## 2021-07-30 ENCOUNTER — TELEPHONE (OUTPATIENT)
Dept: PERINATAL CARE | Facility: HOSPITAL | Age: 26
End: 2021-07-30

## 2021-07-30 DIAGNOSIS — O09.899 HISTORY OF PRETERM DELIVERY, CURRENTLY PREGNANT: Primary | ICD-10-CM

## 2021-07-30 DIAGNOSIS — O99.212 OBESITY AFFECTING PREGNANCY IN SECOND TRIMESTER: ICD-10-CM

## 2021-07-30 DIAGNOSIS — O09.299 HISTORY OF SPONTANEOUS ABORTION, CURRENTLY PREGNANT: ICD-10-CM

## 2021-07-30 LAB — T PALLIDUM AB SER QL: NEGATIVE

## 2021-07-30 NOTE — TELEPHONE ENCOUNTER
RECOMMENDATIONS:  · Continue care with Dr. Hilda Ferreira  · Level II and CL at 20 weeks  · Early GDM screen  · Third trimester ultrasound  · Daily low dose ASA  · Weekly NST at 36 weeks  · Limit gestational weight gain to 18 lbs or less  · Cervical length at 16

## 2021-07-30 NOTE — TELEPHONE ENCOUNTER
Patient is scheduled on   8/19/21  For  Cervical Length 05354  DX SAB X 3, Hx Pre term delivery, BMI >40  Needs PA      Patient is scheduled on   9/16/21  For  Level 2, 01440  DX SAB X 3, Hx Pre term delivery, BMI >40  Needs PA

## 2021-07-31 NOTE — TELEPHONE ENCOUNTER
Authorization Number: [id number]  Case Number: [id number]     Status: Approved  P2P Status:   Approval Date: 7/31/2021 1:14:30 PM  Service Code: 70024  Service Description: TERESITA Cho  Site Name: [address] [place] [address]

## 2021-08-03 ENCOUNTER — TELEPHONE (OUTPATIENT)
Dept: PERINATAL CARE | Facility: HOSPITAL | Age: 26
End: 2021-08-03

## 2021-08-03 NOTE — TELEPHONE ENCOUNTER
Pt 1st trimester screen results obt  Reviewed By DR Katrin Gardiner  Low risk for trisomy 18/13/21  Less than 1 in 07166 risk for trisomy 18/13/21    Report sent for scanning into pt record

## 2021-08-05 ENCOUNTER — ROUTINE PRENATAL (OUTPATIENT)
Dept: OBGYN CLINIC | Facility: CLINIC | Age: 26
End: 2021-08-05
Payer: MEDICAID

## 2021-08-05 VITALS — BODY MASS INDEX: 40 KG/M2 | DIASTOLIC BLOOD PRESSURE: 72 MMHG | SYSTOLIC BLOOD PRESSURE: 120 MMHG | WEIGHT: 223 LBS

## 2021-08-05 DIAGNOSIS — R82.90 ABNORMAL URINALYSIS: ICD-10-CM

## 2021-08-05 DIAGNOSIS — Z34.81 ENCOUNTER FOR SUPERVISION OF OTHER NORMAL PREGNANCY IN FIRST TRIMESTER: ICD-10-CM

## 2021-08-05 DIAGNOSIS — N89.8 VAGINAL DISCHARGE: Primary | ICD-10-CM

## 2021-08-05 DIAGNOSIS — B37.9 YEAST INFECTION: ICD-10-CM

## 2021-08-05 LAB
APPEARANCE: CLEAR
BILIRUBIN: NEGATIVE
GLUCOSE (URINE DIPSTICK): NEGATIVE MG/DL
KETONES (URINE DIPSTICK): NEGATIVE MG/DL
MULTISTIX LOT#: ABNORMAL NUMERIC
NITRITE, URINE: NEGATIVE
PH, URINE: 7.5 (ref 4.5–8)
PROTEIN (URINE DIPSTICK): NEGATIVE MG/DL
SPECIFIC GRAVITY: 1.01 (ref 1–1.03)
URINE-COLOR: YELLOW
UROBILINOGEN,SEMI-QN: 0.2 MG/DL (ref 0–1.9)

## 2021-08-05 PROCEDURE — 99213 OFFICE O/P EST LOW 20 MIN: CPT | Performed by: OBSTETRICS & GYNECOLOGY

## 2021-08-05 PROCEDURE — 81003 URINALYSIS AUTO W/O SCOPE: CPT | Performed by: OBSTETRICS & GYNECOLOGY

## 2021-08-05 PROCEDURE — 3078F DIAST BP <80 MM HG: CPT | Performed by: OBSTETRICS & GYNECOLOGY

## 2021-08-05 PROCEDURE — 76815 OB US LIMITED FETUS(S): CPT | Performed by: OBSTETRICS & GYNECOLOGY

## 2021-08-05 PROCEDURE — 3074F SYST BP LT 130 MM HG: CPT | Performed by: OBSTETRICS & GYNECOLOGY

## 2021-08-05 NOTE — PROGRESS NOTES
HPI:   Dahlia Seals is a 31year old female who presents for a hx of white vaginal discharge and external vaginal itching.        Wt Readings from Last 6 Encounters:  08/05/21 : 223 lb (101.2 kg)  07/29/21 : 224 lb (101.6 kg)  04/15/21 : 221 lb No Known Problems Mother       Social History:   Social History    Tobacco Use      Smoking status: Never Smoker      Smokeless tobacco: Never Used    Alcohol use: No      Alcohol/week: 0.0 standard drinks    Drug use: No           REVIEW OF SYSTEMS:   GEN

## 2021-08-07 LAB
GENITAL VAGINOSIS SCREEN: NEGATIVE
TRICHOMONAS SCREEN: NEGATIVE

## 2021-08-12 NOTE — PROGRESS NOTES
Marisa Guzman     Dear Dr. Hilda Ferreira,     Thank you for requesting ultrasound evaluation and maternal fetal medicine consultation on your patient Jesus Rasheed.   As you are aware she is a 31year old female [id number] She was admitted to the hospital and received betamethasone and delivered one week later.   That pregnancy was complicated by first trimester bleeding for 2 weeks but no other complications.     The definition and recurrence risk and potential treatment opt majority of the time (>50%) was spent in review of records, consultation and coordination of care. Our discussion is summarized above. The approximate physician face-to-face time was 30 minutes.

## 2021-08-19 ENCOUNTER — HOSPITAL ENCOUNTER (OUTPATIENT)
Dept: PERINATAL CARE | Facility: HOSPITAL | Age: 26
Discharge: HOME | End: 2021-08-19
Attending: OBSTETRICS & GYNECOLOGY
Payer: MEDICAID

## 2021-08-19 ENCOUNTER — TELEPHONE (OUTPATIENT)
Dept: OBGYN CLINIC | Facility: CLINIC | Age: 26
End: 2021-08-19

## 2021-08-19 VITALS
WEIGHT: 220 LBS | SYSTOLIC BLOOD PRESSURE: 127 MMHG | HEART RATE: 86 BPM | BODY MASS INDEX: 39 KG/M2 | DIASTOLIC BLOOD PRESSURE: 79 MMHG

## 2021-08-19 DIAGNOSIS — O09.899 HISTORY OF PRETERM DELIVERY, CURRENTLY PREGNANT: ICD-10-CM

## 2021-08-19 DIAGNOSIS — R79.89 LOW TSH LEVEL: Primary | ICD-10-CM

## 2021-08-19 DIAGNOSIS — O09.899 HISTORY OF PRETERM DELIVERY, CURRENTLY PREGNANT: Primary | ICD-10-CM

## 2021-08-19 DIAGNOSIS — N96 HISTORY OF MULTIPLE MISCARRIAGES: ICD-10-CM

## 2021-08-19 PROCEDURE — 99214 OFFICE O/P EST MOD 30 MIN: CPT | Performed by: OBSTETRICS & GYNECOLOGY

## 2021-08-19 PROCEDURE — 76817 TRANSVAGINAL US OBSTETRIC: CPT | Performed by: OBSTETRICS & GYNECOLOGY

## 2021-08-19 PROCEDURE — 76815 OB US LIMITED FETUS(S): CPT | Performed by: OBSTETRICS & GYNECOLOGY

## 2021-08-19 RX ORDER — PROGESTERONE 200 MG/1
200 CAPSULE ORAL NIGHTLY
Qty: 60 CAPSULE | Refills: 2 | Status: SHIPPED | OUTPATIENT
Start: 2021-08-19 | End: 2021-08-19

## 2021-08-19 RX ORDER — PROGESTERONE 200 MG/1
200 CAPSULE ORAL NIGHTLY
Qty: 60 CAPSULE | Refills: 2 | Status: SHIPPED | OUTPATIENT
Start: 2021-08-19 | End: 2021-10-24

## 2021-08-19 NOTE — TELEPHONE ENCOUNTER
Patient name and  verified. Patient informed TSH needs to be completed. Order entered for patient to complete. Informed TSH is a fasting lab. Verbalized understanding.

## 2021-08-19 NOTE — TELEPHONE ENCOUNTER
Lindaann Riedel From registrar said Pt was there today and left supposed to have a blood test no order, there are 3 test pending with ASJ/////

## 2021-08-24 ENCOUNTER — LAB ENCOUNTER (OUTPATIENT)
Dept: LAB | Facility: HOSPITAL | Age: 26
End: 2021-08-24
Attending: OBSTETRICS & GYNECOLOGY
Payer: MEDICAID

## 2021-08-24 DIAGNOSIS — R79.89 LOW TSH LEVEL: ICD-10-CM

## 2021-08-24 LAB — TSI SER-ACNC: 0.47 MIU/ML (ref 0.36–3.74)

## 2021-08-24 PROCEDURE — 84443 ASSAY THYROID STIM HORMONE: CPT

## 2021-08-24 PROCEDURE — 36415 COLL VENOUS BLD VENIPUNCTURE: CPT

## 2021-08-30 ENCOUNTER — TELEPHONE (OUTPATIENT)
Dept: OBGYN CLINIC | Facility: CLINIC | Age: 26
End: 2021-08-30

## 2021-08-30 NOTE — TELEPHONE ENCOUNTER
Netskope message sent to pt.      ----- Message from Keke Jose MD sent at 8/30/2021  2:58 PM CDT -----  Please inform, TSH in normal range. Please ask pt to f/u with her PCP doctor.

## 2021-08-31 ENCOUNTER — INITIAL PRENATAL (OUTPATIENT)
Dept: OBGYN CLINIC | Facility: CLINIC | Age: 26
End: 2021-08-31
Payer: MEDICAID

## 2021-08-31 VITALS — DIASTOLIC BLOOD PRESSURE: 64 MMHG | SYSTOLIC BLOOD PRESSURE: 118 MMHG | WEIGHT: 221.19 LBS | BODY MASS INDEX: 39 KG/M2

## 2021-08-31 DIAGNOSIS — Z34.92 NORMAL PREGNANCY IN SECOND TRIMESTER: Primary | ICD-10-CM

## 2021-08-31 LAB
APPEARANCE: CLEAR
BILIRUBIN: NEGATIVE
GLUCOSE (URINE DIPSTICK): NEGATIVE MG/DL
KETONES (URINE DIPSTICK): NEGATIVE MG/DL
LEUKOCYTES: NEGATIVE
MULTISTIX LOT#: NORMAL NUMERIC
NITRITE, URINE: NEGATIVE
OCCULT BLOOD: NEGATIVE
PH, URINE: 7 (ref 4.5–8)
PROTEIN (URINE DIPSTICK): NEGATIVE MG/DL
SPECIFIC GRAVITY: 1.02 (ref 1–1.03)
URINE-COLOR: YELLOW
UROBILINOGEN,SEMI-QN: 0.2 MG/DL (ref 0–1.9)

## 2021-08-31 PROCEDURE — 81002 URINALYSIS NONAUTO W/O SCOPE: CPT | Performed by: OBSTETRICS & GYNECOLOGY

## 2021-08-31 PROCEDURE — 3074F SYST BP LT 130 MM HG: CPT | Performed by: OBSTETRICS & GYNECOLOGY

## 2021-08-31 PROCEDURE — 3078F DIAST BP <80 MM HG: CPT | Performed by: OBSTETRICS & GYNECOLOGY

## 2021-08-31 PROCEDURE — 0502F SUBSEQUENT PRENATAL CARE: CPT | Performed by: OBSTETRICS & GYNECOLOGY

## 2021-09-01 LAB
C TRACH DNA SPEC QL NAA+PROBE: NEGATIVE
N GONORRHOEA DNA SPEC QL NAA+PROBE: NEGATIVE

## 2021-09-09 NOTE — PROGRESS NOTES
Mahesh Santos  Dear Dr. Yeison Rodriguez,     Thank you for requesting ultrasound evaluation and maternal fetal medicine consultation on your patient Abiodun Avelar you are aware she is a 31year old female [id number]  is recommended.  The limitations of ultrasound were discussed.     See imaging tab for the complete US report.     DISCUSSION  During her visit we discussed and reviewed the following issues:  [address]  History  3/2016 - PPROM at 32 wks. Kym Hinojosa was gain to 18 lbs or less  · Continue vaginal progesterone and will continue until 36 weeks  · Delivery at 39-40 weeks unless earlier delivery becomes indicated     Thank you for allowing me to participate in the care of your patient. José Miguel Dunbar do not hesitate

## 2021-09-16 ENCOUNTER — HOSPITAL ENCOUNTER (OUTPATIENT)
Dept: PERINATAL CARE | Facility: HOSPITAL | Age: 26
Discharge: HOME | End: 2021-09-16
Attending: OBSTETRICS & GYNECOLOGY
Payer: MEDICAID

## 2021-09-16 ENCOUNTER — TELEPHONE (OUTPATIENT)
Dept: PERINATAL CARE | Facility: HOSPITAL | Age: 26
End: 2021-09-16

## 2021-09-16 ENCOUNTER — ROUTINE PRENATAL (OUTPATIENT)
Dept: OBGYN CLINIC | Facility: CLINIC | Age: 26
End: 2021-09-16
Payer: MEDICAID

## 2021-09-16 VITALS
WEIGHT: 222 LBS | BODY MASS INDEX: 39 KG/M2 | SYSTOLIC BLOOD PRESSURE: 117 MMHG | DIASTOLIC BLOOD PRESSURE: 74 MMHG | HEART RATE: 91 BPM

## 2021-09-16 VITALS — DIASTOLIC BLOOD PRESSURE: 70 MMHG | SYSTOLIC BLOOD PRESSURE: 120 MMHG

## 2021-09-16 DIAGNOSIS — Z34.92 NORMAL PREGNANCY IN SECOND TRIMESTER: Primary | ICD-10-CM

## 2021-09-16 DIAGNOSIS — O09.899 HISTORY OF PRETERM DELIVERY, CURRENTLY PREGNANT: ICD-10-CM

## 2021-09-16 DIAGNOSIS — O99.212 OBESITY AFFECTING PREGNANCY IN SECOND TRIMESTER: ICD-10-CM

## 2021-09-16 DIAGNOSIS — Z36.89 ENCOUNTER FOR FETAL ANATOMIC SURVEY: ICD-10-CM

## 2021-09-16 PROCEDURE — 76811 OB US DETAILED SNGL FETUS: CPT | Performed by: OBSTETRICS & GYNECOLOGY

## 2021-09-16 PROCEDURE — 0502F SUBSEQUENT PRENATAL CARE: CPT | Performed by: OBSTETRICS & GYNECOLOGY

## 2021-09-16 PROCEDURE — 3078F DIAST BP <80 MM HG: CPT | Performed by: OBSTETRICS & GYNECOLOGY

## 2021-09-16 PROCEDURE — 99215 OFFICE O/P EST HI 40 MIN: CPT | Performed by: OBSTETRICS & GYNECOLOGY

## 2021-09-16 PROCEDURE — 76817 TRANSVAGINAL US OBSTETRIC: CPT | Performed by: OBSTETRICS & GYNECOLOGY

## 2021-09-16 PROCEDURE — 3074F SYST BP LT 130 MM HG: CPT | Performed by: OBSTETRICS & GYNECOLOGY

## 2021-09-16 NOTE — TELEPHONE ENCOUNTER
Patient is scheduled on 11-11-21  For  NTFUDE/77835  DX HX PRE TERM/HIGH BMI  Needs PA    Patient is scheduled on 12-9-21 & 1-6-22  For  GROWTH/BPP 41370/98618  DX HX PRE TERM/HIGH BMI  Needs PA

## 2021-09-16 NOTE — PROGRESS NOTES
Pt is s/p mfm level 2 ultrasound, pt stated everything is normal, and has f/u ultraosound at 28 weeks. To continue vaginal progesterone until 36 weeks.

## 2021-09-26 ENCOUNTER — HOSPITAL ENCOUNTER (EMERGENCY)
Facility: HOSPITAL | Age: 26
Discharge: HOME | End: 2021-09-26
Attending: EMERGENCY MEDICINE
Payer: MEDICAID

## 2021-09-26 ENCOUNTER — APPOINTMENT (OUTPATIENT)
Dept: GENERAL RADIOLOGY | Facility: HOSPITAL | Age: 26
End: 2021-09-26
Attending: EMERGENCY MEDICINE
Payer: MEDICAID

## 2021-09-26 ENCOUNTER — APPOINTMENT (OUTPATIENT)
Dept: ULTRASOUND IMAGING | Facility: HOSPITAL | Age: 26
End: 2021-09-26
Attending: EMERGENCY MEDICINE
Payer: MEDICAID

## 2021-09-26 VITALS
SYSTOLIC BLOOD PRESSURE: 124 MMHG | BODY MASS INDEX: 38.98 KG/M2 | HEART RATE: 99 BPM | OXYGEN SATURATION: 99 % | HEIGHT: 63 IN | DIASTOLIC BLOOD PRESSURE: 72 MMHG | TEMPERATURE: 99 F | RESPIRATION RATE: 18 BRPM | WEIGHT: 220 LBS

## 2021-09-26 DIAGNOSIS — R60.1 GENERALIZED EDEMA: Primary | ICD-10-CM

## 2021-09-26 LAB
ALBUMIN SERPL-MCNC: 2.5 G/DL (ref 3.4–5)
ALP LIVER SERPL-CCNC: 88 U/L
ALT SERPL-CCNC: 25 U/L
ANION GAP SERPL CALC-SCNC: 7 MMOL/L (ref 0–18)
AST SERPL-CCNC: 18 U/L (ref 15–37)
BASOPHILS # BLD AUTO: 0.01 X10(3) UL (ref 0–0.2)
BASOPHILS NFR BLD AUTO: 0.2 %
BILIRUB DIRECT SERPL-MCNC: <0.1 MG/DL (ref 0–0.2)
BILIRUB SERPL-MCNC: 0.2 MG/DL (ref 0.1–2)
BILIRUB UR QL: NEGATIVE
BUN BLD-MCNC: 4 MG/DL (ref 7–18)
BUN/CREAT SERPL: 8.3 (ref 10–20)
CALCIUM BLD-MCNC: 8.1 MG/DL (ref 8.5–10.1)
CHLORIDE SERPL-SCNC: 110 MMOL/L (ref 98–112)
CO2 SERPL-SCNC: 23 MMOL/L (ref 21–32)
COLOR UR: YELLOW
CREAT BLD-MCNC: 0.48 MG/DL
DEPRECATED RDW RBC AUTO: 57.5 FL (ref 35.1–46.3)
EOSINOPHIL # BLD AUTO: 0.09 X10(3) UL (ref 0–0.7)
EOSINOPHIL NFR BLD AUTO: 1.4 %
ERYTHROCYTE [DISTWIDTH] IN BLOOD BY AUTOMATED COUNT: 19.2 % (ref 11–15)
GLUCOSE BLD-MCNC: 101 MG/DL (ref 70–99)
GLUCOSE UR-MCNC: NEGATIVE MG/DL
HCT VFR BLD AUTO: 33.7 %
HGB BLD-MCNC: 10.8 G/DL
HGB UR QL STRIP.AUTO: NEGATIVE
IMM GRANULOCYTES # BLD AUTO: 0.02 X10(3) UL (ref 0–1)
IMM GRANULOCYTES NFR BLD: 0.3 %
KETONES UR-MCNC: NEGATIVE MG/DL
LYMPHOCYTES # BLD AUTO: 1.33 X10(3) UL (ref 1–4)
LYMPHOCYTES NFR BLD AUTO: 20.4 %
MCH RBC QN AUTO: 26.3 PG (ref 26–34)
MCHC RBC AUTO-ENTMCNC: 32 G/DL (ref 31–37)
MCV RBC AUTO: 82 FL
MONOCYTES # BLD AUTO: 0.46 X10(3) UL (ref 0.1–1)
MONOCYTES NFR BLD AUTO: 7.1 %
NEUTROPHILS # BLD AUTO: 4.61 X10 (3) UL (ref 1.5–7.7)
NEUTROPHILS # BLD AUTO: 4.61 X10(3) UL (ref 1.5–7.7)
NEUTROPHILS NFR BLD AUTO: 70.6 %
NITRITE UR QL STRIP.AUTO: NEGATIVE
OSMOLALITY SERPL CALC.SUM OF ELEC: 287 MOSM/KG (ref 275–295)
PH UR: 6 [PH] (ref 5–8)
PLATELET # BLD AUTO: 143 10(3)UL (ref 150–450)
POTASSIUM SERPL-SCNC: 3.5 MMOL/L (ref 3.5–5.1)
PROT SERPL-MCNC: 6.3 G/DL (ref 6.4–8.2)
PROT UR-MCNC: 30 MG/DL
RBC # BLD AUTO: 4.11 X10(6)UL
SODIUM SERPL-SCNC: 140 MMOL/L (ref 136–145)
SP GR UR STRIP: 1.03 (ref 1–1.03)
UROBILINOGEN UR STRIP-ACNC: 4
WBC # BLD AUTO: 6.5 X10(3) UL (ref 4–11)

## 2021-09-26 PROCEDURE — 81001 URINALYSIS AUTO W/SCOPE: CPT | Performed by: EMERGENCY MEDICINE

## 2021-09-26 PROCEDURE — 71045 X-RAY EXAM CHEST 1 VIEW: CPT | Performed by: EMERGENCY MEDICINE

## 2021-09-26 PROCEDURE — 85025 COMPLETE CBC W/AUTO DIFF WBC: CPT | Performed by: EMERGENCY MEDICINE

## 2021-09-26 PROCEDURE — 99284 EMERGENCY DEPT VISIT MOD MDM: CPT

## 2021-09-26 PROCEDURE — 80076 HEPATIC FUNCTION PANEL: CPT | Performed by: EMERGENCY MEDICINE

## 2021-09-26 PROCEDURE — 80048 BASIC METABOLIC PNL TOTAL CA: CPT | Performed by: EMERGENCY MEDICINE

## 2021-09-26 PROCEDURE — 93970 EXTREMITY STUDY: CPT | Performed by: EMERGENCY MEDICINE

## 2021-09-26 PROCEDURE — 36415 COLL VENOUS BLD VENIPUNCTURE: CPT

## 2021-09-26 NOTE — ED PROVIDER NOTES
Patient Seen in: [facility] AND [facility] Emergency Department      History   Patient presents with:  Swelling Edema    Stated Complaint: x22 weeks pregnant; feet are swollen and numb    Subjective:   HPI    48-year-old pregnant female (22 weeks) with history o tobacco: Never Used    Alcohol use: No      Alcohol/week: 0.0 standard drinks    Drug use: No             Review of Systems    Positive for stated complaint: x22 weeks pregnant; feet are swollen and numb  Other systems are as noted in HPI.   Constitutional Esterase Urine Trace (*)     Squamous Epi. Cells Few (*)     All other components within normal limits   HEPATIC FUNCTION PANEL (7) - Abnormal; Notable for the following components:     Total Protein 6.3 (*)     Albumin 2.5 (*)     All other components with

## 2021-09-26 NOTE — ED QUICK NOTES
PIV placed, labs sent, urine sent, offered blanket, pt declined at this time, call light in reach, explained process of ED visit awaiting results of labs and test, pt asking to speak with charge RN, Charge RN aware

## 2021-09-26 NOTE — ED INITIAL ASSESSMENT (HPI)
Swelling to BL feet and hands. States her hands feel weak and painful also. L&D - treat in ER first, get heart tones, Claribel CHEN will call Nati Perez to see if he needs to see her.

## 2021-10-14 ENCOUNTER — ROUTINE PRENATAL (OUTPATIENT)
Dept: OBGYN CLINIC | Facility: CLINIC | Age: 26
End: 2021-10-14
Payer: MEDICAID

## 2021-10-14 VITALS — BODY MASS INDEX: 40 KG/M2 | SYSTOLIC BLOOD PRESSURE: 124 MMHG | WEIGHT: 223 LBS | DIASTOLIC BLOOD PRESSURE: 72 MMHG

## 2021-10-14 DIAGNOSIS — Z34.82 ENCOUNTER FOR SUPERVISION OF OTHER NORMAL PREGNANCY IN SECOND TRIMESTER: Primary | ICD-10-CM

## 2021-10-14 PROBLEM — O99.119 BENIGN GESTATIONAL THROMBOCYTOPENIA, ANTEPARTUM (HCC): Status: ACTIVE | Noted: 2021-10-14

## 2021-10-14 PROBLEM — O99.019 ANTEPARTUM ANEMIA: Status: ACTIVE | Noted: 2021-10-14

## 2021-10-14 PROBLEM — O99.019 ANTEPARTUM ANEMIA (HCC): Status: ACTIVE | Noted: 2021-10-14

## 2021-10-14 PROBLEM — D69.6 BENIGN GESTATIONAL THROMBOCYTOPENIA, ANTEPARTUM (HCC): Status: ACTIVE | Noted: 2021-10-14

## 2021-10-14 PROCEDURE — 3074F SYST BP LT 130 MM HG: CPT | Performed by: OBSTETRICS & GYNECOLOGY

## 2021-10-14 PROCEDURE — 81002 URINALYSIS NONAUTO W/O SCOPE: CPT | Performed by: OBSTETRICS & GYNECOLOGY

## 2021-10-14 PROCEDURE — 0502F SUBSEQUENT PRENATAL CARE: CPT | Performed by: OBSTETRICS & GYNECOLOGY

## 2021-10-14 PROCEDURE — 3078F DIAST BP <80 MM HG: CPT | Performed by: OBSTETRICS & GYNECOLOGY

## 2021-10-14 PROCEDURE — 90471 IMMUNIZATION ADMIN: CPT | Performed by: OBSTETRICS & GYNECOLOGY

## 2021-10-14 PROCEDURE — 90686 IIV4 VACC NO PRSV 0.5 ML IM: CPT | Performed by: OBSTETRICS & GYNECOLOGY

## 2021-10-14 NOTE — PROGRESS NOTES
[facility], [facility]  Obstetrics and Gynecology  Prenatal Visit  Jay Melvin MD    HPI   Loreta Sim is a 31year old.o. [id number] 24w4d weeks. Here for routine prenatal visit and is without complaints.   Patient denies any regular uterine contra including fever and other constitutional symptoms. Discussed recommendations for flu vaccine in father of baby and other household contacts or regular caregivers. Flu vaccine offered and was given.   Follow up with Miranda Leonard or OTONIEL Benítez in 4

## 2021-10-15 ENCOUNTER — HOSPITAL ENCOUNTER (OUTPATIENT)
Facility: HOSPITAL | Age: 26
Setting detail: OBSERVATION
Discharge: HOME | End: 2021-10-15
Attending: OBSTETRICS & GYNECOLOGY | Admitting: OBSTETRICS & GYNECOLOGY
Payer: MEDICAID

## 2021-10-15 VITALS — SYSTOLIC BLOOD PRESSURE: 124 MMHG | DIASTOLIC BLOOD PRESSURE: 53 MMHG | HEART RATE: 86 BPM

## 2021-10-15 PROBLEM — O26.899 ABDOMINAL PAIN IN PREGNANCY: Status: ACTIVE | Noted: 2021-10-15

## 2021-10-15 PROBLEM — O26.899 ABDOMINAL PAIN IN PREGNANCY (HCC): Status: ACTIVE | Noted: 2021-10-15

## 2021-10-15 PROBLEM — R10.9 ABDOMINAL PAIN IN PREGNANCY: Status: ACTIVE | Noted: 2021-10-15

## 2021-10-15 PROBLEM — R10.9 ABDOMINAL PAIN IN PREGNANCY (HCC): Status: ACTIVE | Noted: 2021-10-15

## 2021-10-15 PROCEDURE — 59025 FETAL NON-STRESS TEST: CPT | Performed by: OBSTETRICS & GYNECOLOGY

## 2021-10-16 NOTE — TRIAGE
[facility]      Triage Note    [address] Patient Status:  Observation    1995 MRN [id number]   Location [address]  Attending Víctor Herrera MD   Hosp Day # 0 PCP Xi Macdonald MD gestational age                        Additional Comments       Patient presents with:  R/o  Labor: abdominal pain for past 2 days  FHT appropriate for gestational age, no contractions on EFM, or per palpation, SVE ft, internal os closed, cx thick

## 2021-10-16 NOTE — PROGRESS NOTES
Pt is a 31year old female admitted to TR3/TR3-A. Patient presents with:  R/o  Labor: abdominal pain for past 2 days     Pt is [id number] 24w5d intra-uterine pregnancy. History obtained, consents signed. Oriented to room, staff, and plan of care.

## 2021-10-16 NOTE — TRIAGE
[facility]      Triage Note    Rafaela Jiménez [facility] Patient Status:  Observation    1995 MRN [id number]   Location [address] Attending No att. providers found   [facility] Day # 0 PCP Kiesha Blair MD Acoustic Stimulator: No           Nonstress Test Interpretation: Reactive           Nonstress Test Second Interpretation: Appropriate for gestational age                        Additional Comments       Patient presents w

## 2021-10-18 ENCOUNTER — HOSPITAL ENCOUNTER (OUTPATIENT)
Facility: HOSPITAL | Age: 26
Setting detail: OBSERVATION
Discharge: HOME | End: 2021-10-18
Attending: OBSTETRICS & GYNECOLOGY | Admitting: OBSTETRICS & GYNECOLOGY
Payer: MEDICAID

## 2021-10-18 ENCOUNTER — TELEPHONE (OUTPATIENT)
Dept: OBGYN CLINIC | Facility: CLINIC | Age: 26
End: 2021-10-18

## 2021-10-18 VITALS — DIASTOLIC BLOOD PRESSURE: 75 MMHG | HEART RATE: 90 BPM | SYSTOLIC BLOOD PRESSURE: 122 MMHG

## 2021-10-18 PROBLEM — Z34.90 PREGNANCY: Status: ACTIVE | Noted: 2021-10-18

## 2021-10-18 PROBLEM — Z34.90 PREGNANCY (HCC): Status: ACTIVE | Noted: 2021-10-18

## 2021-10-18 PROCEDURE — 87808 TRICHOMONAS ASSAY W/OPTIC: CPT | Performed by: OBSTETRICS & GYNECOLOGY

## 2021-10-18 PROCEDURE — 87205 SMEAR GRAM STAIN: CPT | Performed by: OBSTETRICS & GYNECOLOGY

## 2021-10-18 PROCEDURE — 87106 FUNGI IDENTIFICATION YEAST: CPT | Performed by: OBSTETRICS & GYNECOLOGY

## 2021-10-18 PROCEDURE — 87086 URINE CULTURE/COLONY COUNT: CPT | Performed by: OBSTETRICS & GYNECOLOGY

## 2021-10-18 PROCEDURE — 99213 OFFICE O/P EST LOW 20 MIN: CPT

## 2021-10-18 NOTE — TELEPHONE ENCOUNTER
Pt Name and  verified. OB History     T1    L2    SAB3  IAB0  Ectopic0  Multiple0  Live Births2     Pt is calling in regards to yellow discharge she has noticed.  Pt states that she was recently seen and everything was well at her last apt

## 2021-10-18 NOTE — TELEPHONE ENCOUNTER
Please help pt make an appt asap for office evaluation of yellow discharge,  genital screen to check for BV or trichomonas can be done in the office then.

## 2021-10-19 ENCOUNTER — ROUTINE PRENATAL (OUTPATIENT)
Dept: OBGYN CLINIC | Facility: CLINIC | Age: 26
End: 2021-10-19
Payer: MEDICAID

## 2021-10-19 ENCOUNTER — HOSPITAL ENCOUNTER (EMERGENCY)
Facility: HOSPITAL | Age: 26
Discharge: HOME | End: 2021-10-19
Attending: EMERGENCY MEDICINE
Payer: MEDICAID

## 2021-10-19 ENCOUNTER — TELEPHONE (OUTPATIENT)
Dept: OBGYN CLINIC | Facility: CLINIC | Age: 26
End: 2021-10-19

## 2021-10-19 ENCOUNTER — ULTRASOUND ENCOUNTER (OUTPATIENT)
Dept: PERINATAL CARE | Facility: HOSPITAL | Age: 26
End: 2021-10-19
Attending: OBSTETRICS & GYNECOLOGY
Payer: MEDICAID

## 2021-10-19 ENCOUNTER — HOSPITAL ENCOUNTER (OUTPATIENT)
Facility: HOSPITAL | Age: 26
Discharge: ANOTHER HOSPITAL | End: 2021-10-19
Attending: OBSTETRICS & GYNECOLOGY | Admitting: OBSTETRICS & GYNECOLOGY
Payer: MEDICAID

## 2021-10-19 ENCOUNTER — HOSPITAL ENCOUNTER (INPATIENT)
Facility: HOSPITAL | Age: 26
LOS: 5 days | Discharge: HOME | End: 2021-10-24
Attending: OBSTETRICS & GYNECOLOGY | Admitting: OBSTETRICS & GYNECOLOGY
Payer: MEDICAID

## 2021-10-19 VITALS
DIASTOLIC BLOOD PRESSURE: 71 MMHG | BODY MASS INDEX: 39 KG/M2 | HEART RATE: 108 BPM | WEIGHT: 220.81 LBS | SYSTOLIC BLOOD PRESSURE: 131 MMHG

## 2021-10-19 VITALS
OXYGEN SATURATION: 99 % | RESPIRATION RATE: 18 BRPM | TEMPERATURE: 99 F | HEART RATE: 84 BPM | SYSTOLIC BLOOD PRESSURE: 107 MMHG | DIASTOLIC BLOOD PRESSURE: 55 MMHG

## 2021-10-19 VITALS
HEART RATE: 96 BPM | BODY MASS INDEX: 38.98 KG/M2 | RESPIRATION RATE: 20 BRPM | OXYGEN SATURATION: 98 % | TEMPERATURE: 100 F | HEIGHT: 63 IN | WEIGHT: 220 LBS | SYSTOLIC BLOOD PRESSURE: 106 MMHG | DIASTOLIC BLOOD PRESSURE: 53 MMHG

## 2021-10-19 DIAGNOSIS — Z34.82 ENCOUNTER FOR SUPERVISION OF OTHER NORMAL PREGNANCY IN SECOND TRIMESTER: ICD-10-CM

## 2021-10-19 DIAGNOSIS — O09.899 HISTORY OF PRETERM DELIVERY, CURRENTLY PREGNANT: ICD-10-CM

## 2021-10-19 DIAGNOSIS — N89.8 VAGINAL DISCHARGE: Primary | ICD-10-CM

## 2021-10-19 DIAGNOSIS — R10.9 ABDOMINAL PAIN OF UNKNOWN ETIOLOGY: Primary | ICD-10-CM

## 2021-10-19 DIAGNOSIS — O09.212 CURRENT PREGNANCY WITH HISTORY OF PRE-TERM LABOR IN SECOND TRIMESTER: ICD-10-CM

## 2021-10-19 PROBLEM — O60.02 PRETERM LABOR IN SECOND TRIMESTER: Status: ACTIVE | Noted: 2021-10-19

## 2021-10-19 PROBLEM — R10.2 PELVIC CRAMPING: Status: RESOLVED | Noted: 2020-04-24 | Resolved: 2021-10-19

## 2021-10-19 PROBLEM — N92.6 MISSED MENSES: Status: RESOLVED | Noted: 2021-03-30 | Resolved: 2021-10-19

## 2021-10-19 PROBLEM — O60.02 PRETERM LABOR IN SECOND TRIMESTER (HCC): Status: ACTIVE | Noted: 2021-10-19

## 2021-10-19 PROCEDURE — 96374 THER/PROPH/DIAG INJ IV PUSH: CPT

## 2021-10-19 PROCEDURE — 76818 FETAL BIOPHYS PROFILE W/NST: CPT | Performed by: OBSTETRICS & GYNECOLOGY

## 2021-10-19 PROCEDURE — 85025 COMPLETE CBC W/AUTO DIFF WBC: CPT | Performed by: EMERGENCY MEDICINE

## 2021-10-19 PROCEDURE — 81002 URINALYSIS NONAUTO W/O SCOPE: CPT | Performed by: OBSTETRICS & GYNECOLOGY

## 2021-10-19 PROCEDURE — 99284 EMERGENCY DEPT VISIT MOD MDM: CPT

## 2021-10-19 PROCEDURE — 80048 BASIC METABOLIC PNL TOTAL CA: CPT | Performed by: EMERGENCY MEDICINE

## 2021-10-19 PROCEDURE — 0502F SUBSEQUENT PRENATAL CARE: CPT | Performed by: OBSTETRICS & GYNECOLOGY

## 2021-10-19 PROCEDURE — 3075F SYST BP GE 130 - 139MM HG: CPT | Performed by: OBSTETRICS & GYNECOLOGY

## 2021-10-19 PROCEDURE — 76816 OB US FOLLOW-UP PER FETUS: CPT | Performed by: OBSTETRICS & GYNECOLOGY

## 2021-10-19 PROCEDURE — 76817 TRANSVAGINAL US OBSTETRIC: CPT

## 2021-10-19 PROCEDURE — 99220 INITIAL OBSERVATION CARE,LEVL III: CPT | Performed by: OBSTETRICS & GYNECOLOGY

## 2021-10-19 PROCEDURE — 3078F DIAST BP <80 MM HG: CPT | Performed by: OBSTETRICS & GYNECOLOGY

## 2021-10-19 PROCEDURE — 81001 URINALYSIS AUTO W/SCOPE: CPT | Performed by: EMERGENCY MEDICINE

## 2021-10-19 RX ORDER — ZOLPIDEM TARTRATE 5 MG/1
5 TABLET ORAL NIGHTLY PRN
Status: DISCONTINUED | OUTPATIENT
Start: 2021-10-19 | End: 2021-10-22

## 2021-10-19 RX ORDER — CHOLECALCIFEROL (VITAMIN D3) 25 MCG
1 TABLET,CHEWABLE ORAL DAILY
Status: DISCONTINUED | OUTPATIENT
Start: 2021-10-19 | End: 2021-10-22

## 2021-10-19 RX ORDER — DEXTROSE, SODIUM CHLORIDE, SODIUM LACTATE, POTASSIUM CHLORIDE, AND CALCIUM CHLORIDE 5; .6; .31; .03; .02 G/100ML; G/100ML; G/100ML; G/100ML; G/100ML
INJECTION, SOLUTION INTRAVENOUS CONTINUOUS
Status: DISCONTINUED | OUTPATIENT
Start: 2021-10-19 | End: 2021-10-19

## 2021-10-19 RX ORDER — CALCIUM CARBONATE 200(500)MG
1000 TABLET,CHEWABLE ORAL
Status: DISCONTINUED | OUTPATIENT
Start: 2021-10-19 | End: 2021-10-20

## 2021-10-19 RX ORDER — DOCUSATE SODIUM 100 MG/1
100 CAPSULE, LIQUID FILLED ORAL 2 TIMES DAILY
Status: DISCONTINUED | OUTPATIENT
Start: 2021-10-19 | End: 2021-10-22

## 2021-10-19 RX ORDER — BETAMETHASONE SODIUM PHOSPHATE AND BETAMETHASONE ACETATE 3; 3 MG/ML; MG/ML
12 INJECTION, SUSPENSION INTRA-ARTICULAR; INTRALESIONAL; INTRAMUSCULAR; SOFT TISSUE ONCE
Status: COMPLETED | OUTPATIENT
Start: 2021-10-20 | End: 2021-10-20

## 2021-10-19 RX ORDER — BETAMETHASONE SODIUM PHOSPHATE AND BETAMETHASONE ACETATE 3; 3 MG/ML; MG/ML
12 INJECTION, SUSPENSION INTRA-ARTICULAR; INTRALESIONAL; INTRAMUSCULAR; SOFT TISSUE EVERY 24 HOURS
Status: DISCONTINUED | OUTPATIENT
Start: 2021-10-19 | End: 2021-10-19

## 2021-10-19 RX ORDER — CALCIUM GLUCONATE 94 MG/ML
1 INJECTION, SOLUTION INTRAVENOUS ONCE AS NEEDED
Status: DISCONTINUED | OUTPATIENT
Start: 2021-10-19 | End: 2021-10-21

## 2021-10-19 RX ORDER — DOCUSATE SODIUM 100 MG/1
100 CAPSULE, LIQUID FILLED ORAL 2 TIMES DAILY
Status: DISCONTINUED | OUTPATIENT
Start: 2021-10-19 | End: 2021-10-19

## 2021-10-19 RX ORDER — CALCIUM CARBONATE 200(500)MG
1000 TABLET,CHEWABLE ORAL
Status: DISCONTINUED | OUTPATIENT
Start: 2021-10-19 | End: 2021-10-19

## 2021-10-19 RX ORDER — MORPHINE SULFATE 4 MG/ML
4 INJECTION, SOLUTION INTRAMUSCULAR; INTRAVENOUS ONCE
Status: COMPLETED | OUTPATIENT
Start: 2021-10-19 | End: 2021-10-19

## 2021-10-19 RX ORDER — POLYETHYLENE GLYCOL 3350 17 G/17G
17 POWDER, FOR SOLUTION ORAL DAILY PRN
Qty: 12 EACH | Refills: 0 | Status: ON HOLD | OUTPATIENT
Start: 2021-10-19 | End: 2021-10-19

## 2021-10-19 RX ORDER — ACETAMINOPHEN 500 MG
500 TABLET ORAL EVERY 6 HOURS PRN
Status: DISCONTINUED | OUTPATIENT
Start: 2021-10-19 | End: 2021-10-19

## 2021-10-19 RX ORDER — ACETAMINOPHEN 500 MG
1000 TABLET ORAL EVERY 6 HOURS PRN
Status: DISCONTINUED | OUTPATIENT
Start: 2021-10-19 | End: 2021-10-22

## 2021-10-19 RX ORDER — ZOLPIDEM TARTRATE 5 MG/1
5 TABLET ORAL NIGHTLY PRN
Status: DISCONTINUED | OUTPATIENT
Start: 2021-10-19 | End: 2021-10-19

## 2021-10-19 RX ORDER — SODIUM CHLORIDE, SODIUM LACTATE, POTASSIUM CHLORIDE, CALCIUM CHLORIDE 600; 310; 30; 20 MG/100ML; MG/100ML; MG/100ML; MG/100ML
INJECTION, SOLUTION INTRAVENOUS CONTINUOUS
Status: DISCONTINUED | OUTPATIENT
Start: 2021-10-19 | End: 2021-10-22

## 2021-10-19 RX ORDER — ENOXAPARIN SODIUM 100 MG/ML
40 INJECTION SUBCUTANEOUS EVERY 24 HOURS
Status: DISCONTINUED | OUTPATIENT
Start: 2021-10-19 | End: 2021-10-19

## 2021-10-19 RX ORDER — ACETAMINOPHEN 500 MG
1000 TABLET ORAL EVERY 6 HOURS PRN
Status: DISCONTINUED | OUTPATIENT
Start: 2021-10-19 | End: 2021-10-19

## 2021-10-19 RX ORDER — CALCIUM GLUCONATE 94 MG/ML
INJECTION, SOLUTION INTRAVENOUS
Status: DISCONTINUED
Start: 2021-10-19 | End: 2021-10-20 | Stop reason: WASHOUT

## 2021-10-19 RX ORDER — MELATONIN
325
Status: DISCONTINUED | OUTPATIENT
Start: 2021-10-20 | End: 2021-10-22

## 2021-10-19 RX ORDER — ACETAMINOPHEN 500 MG
500 TABLET ORAL EVERY 6 HOURS PRN
Status: DISCONTINUED | OUTPATIENT
Start: 2021-10-19 | End: 2021-10-22

## 2021-10-19 RX ORDER — PROGESTERONE 100 MG/1
100 CAPSULE ORAL DAILY
Status: DISCONTINUED | OUTPATIENT
Start: 2021-10-19 | End: 2021-10-20

## 2021-10-19 NOTE — CONSULTS
[address] [facility] Patient Status:  Observation    1995 MRN [id number]   Location [address] [facility] Attending Gwendolyn Mcghee MD   Hosp Day # 0 PCP Eliseo Ferraro MD     SUBJ Surgical History:  Past Surgical History:   Procedure Laterality Date   • APPENDECTOMY     • APPENDECTOMY     • CHOLECYSTECTOMY     • D & C     • D & C      per NG:Unknown sex; 8 week   •   2014    per Nnd degree perineal lac 11.6 10/19/2021    HGB 11.7 10/19/2021    HCT 35.4 10/19/2021    .0 10/19/2021       We discussed the morbidity and mortality associated with prematurity at various gestational ages. The signs and symptoms of  labor were discussed.   We talke

## 2021-10-19 NOTE — PROGRESS NOTES
Pt is a 31year old female admitted to TR1/TR1-A. Patient presents with:  R/o  Labor: low abdomen and right and left flank pain, started Thursday     Pt is [id number] 25w2d intra-uterine pregnancy. History obtained, consents signed.  Oriented to room

## 2021-10-19 NOTE — H&P
[address] [facility]  Obstetrics and Gynecology  Antepartum History & Physical    Eda Joseph Patient Status:  Observation    1995 MRN [id number]   [facility] 1SW-J Attending Mayda Suarez MD   Hospital Day 0 PCP Tracy Figueredo appendectomy,    • History of multiple miscarriages    • History of Papanicolaou smear of cervix 2014    done per NG   • History of pregnancy     nvsd   • History of  delivery, currently pregnant 2021   • Miscarriage     per N TEST:  Baseline FHR: 130 per minute  Fetal heart variability: moderate  Fetal Heart Rate accelerations: 10X10   Fetal Heart Rate decelerations: none  Tracing category 1 currently  Uterine contractions: rare  Fetal Non-stress Test Interpretation: reassuring

## 2021-10-19 NOTE — TELEPHONE ENCOUNTER
Patient maternal transport to THE [facility] today. Please forward to Willam Hui for deprocessing PN care.

## 2021-10-19 NOTE — CONSULTS
[facility]  Maternal-Fetal Medicine Inpatient Consultation    Date of Admission:  10/19/2021  Date of Consult:  10/19/2021    Reason for Consult:   PTL    History of Present Illness:  Robert Almaguer is a a(n) 31year old female.  G6 GA: 14w0d            Delivery: Not recorded    Apgar1: Not recorded     Adriel Proffer: Not recorded    Living: Not recorded    Name of Baby 6: Not recorded    Date: Not recorded     GA: Not recorded     Delivery: Not recorded    Felipa Mathews: Not recorded     Apgar5: N Oral, BID  •  calcium carbonate antacid (TUMS) chewable tab 1,000 mg, 1,000 mg, Oral, Daily PRN  •  calcium gluconate injection 1 g, 1 g, Intravenous, Once PRN  •  prenatal vitamin w/DHA (PNV with DHA) cap 1 capsule, 1 capsule, Oral, Daily  •  [START ON 10 at 148 bpm   g ( 2 lb 0 oz); 65%. MVP is 4.5 cm . PERLITA 14.8 cm    Transvaginal US findings: Cervix is short and funneling seen. Cervical length measured 33.5 mm     The fetal measurements are consistent with established EDC.  No gross ultrasound ev acute symptoms. Identifying women with  labor who ultimately will give birth  is difficult. Approximately 30% of  labor spontaneously resolves and 50% of patients hospitalized for  labor actually give birth at term.      Alex Ag Because of the possible risks associated with tocolytic and steroid therapies, the use of these drugs should be limited to women with  labor at high risk of spontaneous  birth.  Tocolysis is contraindicated when the maternal and fetal risks of  corticosteroids have significantly lower severity, frequency, or both of respiratory distress syndrome (relative risk [RR], 0.66; 95% confidence interval [CI], 0.59–0.73), intracranial hemorrhage (RR, 0.54; 95% CI, 0.43–0.69), necrotizing enteroc .    Accumulated available evidence suggests that magnesium sulfate reduces the severity and risk of cerebral palsy in surviving infants if administered when birth is anticipated before 32 weeks of gestation.     Tocolytic Therapy  Tocolytic therapy may pro antibiotic treatment and placebo for prolonging pregnancy or preventing  delivery, respiratory distress syndrome, or  sepsis. In fact, antibiotic use may be associated with long-term harm.  Thus, antibiotics should not be used to prolong gest to participate in the care of your patient. Please do not hesitate to contact me if additional questions or concerns arise. The majority of the time (>50%) was spent in review of records, consultation and coordination of care.   Total physician time inclu

## 2021-10-19 NOTE — ED PROVIDER NOTES
Patient Seen in: [facility] AND [facility] Emergency Department      History   Patient presents with:  Abdomen/Flank Pain    Stated Complaint: abd pain    Subjective:   HPI    25-year-old female  at 25 weeks gestation sent here from L&D with complaints of a membranes are moist.   Eyes:      Extraocular Movements: Extraocular movements intact. Cardiovascular:      Rate and Rhythm: Regular rhythm. Pulmonary:      Effort: Pulmonary effort is normal.   Abdominal:      General: There is no distension.       Ten >=60    GFR, -American 153 >=60   CBC W/ DIFFERENTIAL    Collection Time: 10/19/21  1:45 AM   Result Value Ref Range    WBC 10.5 4.0 - 11.0 x10(3) uL    RBC 4.52 3.80 - 5.30 x10(6)uL    HGB 12.1 12.0 - 16.0 g/dL    HCT 37.2 35.0 - 48.0 %    MCV 82. 3 Dr. Reji Briscoe in 2 days or return to ED sooner if symptoms worsen including fevers, chills, vomiting, pt expresses understanding and agrees to d/c instructions    Jenny Rasheed 13:  After obtaining the patient's history, performin

## 2021-10-19 NOTE — TRIAGE
[facility]      Triage Note    [address] Patient Status:  Observation    1995 MRN [id number]   Location [address]  Attending Eleazar Gosselin, MD   Hosp Day # 0 PCP Yesenia De La Garza MD Acoustic Stimulator: No           Nonstress Test Interpretation: Appropriate for gestational age           Nonstress Test Second Interpretation: Appropriate for gestational age                        Additiona

## 2021-10-19 NOTE — PROGRESS NOTES
Problem visit:  Seen in Triage last night and ER. For right sided abdominal pain. Patient given Morphine for pain. No ultrasound done to R/O kidney stones. To Triage now for further W/U for severe abdominal pain.

## 2021-10-19 NOTE — ED QUICK NOTES
Care assumed from triage via [address] seen earlier tonhelen in [address] [place] [address] for generalized abd discomfort and vaginal pressure Denies drainage/bleeding Reports cervix closed Presents to ED with continued generalized abd pain Denies nausea/vomiting reports

## 2021-10-19 NOTE — PROGRESS NOTES
Pt received in stable condition via stretcher from paramedics. Pt reporting uc's every few minutes. EFM tested and applied. Magnesium Sulfate double checked. EFM tested and applied. POC discussed and questions answered.  General hx taken and general assessm

## 2021-10-19 NOTE — TRIAGE
[facility]      Triage Note    [address] Patient Status:  Observation    1995 MRN [id number]   Location [address]  Attending Anisha Bennett MD   Hosp Day # 0 PCP Malik Grady MD SpO2:   99%        NST                 Pt in triage with c/o low abdominal pain since Thursday. Pt was seen in triage last night and found to be closed/thick/high and discharged home. Pt states the pain did not go away and increased in intensity.  Pt fo

## 2021-10-19 NOTE — ED QUICK NOTES
Patient provided with discharge instructions. Verbalized understanding for plan of care at home and follow up. All question and concerns addressed prior to discharge. IV REMOVED, CATHETER. LET PT KNOW RX WAS SENT TO PHARMACY.

## 2021-10-19 NOTE — H&P
[address] Patient Status:  Observation    1995 MRN [id number]   Location [address]  Attending Sophia Madison MD   Hosp Day # 0 PCP Kaden Napier MD 33w0d  4 lb (1.814 kg) M NORMAL SPONT   [place]      Complications:  premature rupture of membranes, onset of labor within 24 hours of rupture, third trimester   3 2015              Birth Comments: D & C      Complications:  Other - see comments   2 Suspension, Take 5 mL by mouth daily as needed for constipation.  (Patient not taking: Reported on 10/19/2021), Disp: 118 mL, Rfl: 0  Ferrous Sulfate (SLOW FE OR), Take by mouth., Disp: , Rfl:   progesterone 200 MG Oral Cap, Place 1 capsule (200 mg total) v Tracy Timmons is at an estimated gestational age of 24w4d with an estimated date of delivery of: 2022, by Ultrasound   labor, acute. Maternal transport being arranged with process as above.   B

## 2021-10-19 NOTE — PROGRESS NOTES
Discharged to home per ambulatory in stable condition with written and verbal instructions. Patient verbalizes understanding of information given.      NST note for 10/18/21    Fhts:  Mod variability, positive accelerations, no contractions on [employer]

## 2021-10-19 NOTE — ED INITIAL ASSESSMENT (HPI)
Pt 25 weeks pregnant and was cleared by L&D just now. Pt still having severe abd pain after 3 days. Pt states the pain goes from the RLQ to the LLQ. Denies vomiting, is nauseous.

## 2021-10-20 PROCEDURE — 99225 SUBSEQUENT OBSERVATION CARE: CPT | Performed by: OBSTETRICS & GYNECOLOGY

## 2021-10-20 PROCEDURE — 59025 FETAL NON-STRESS TEST: CPT | Performed by: OBSTETRICS & GYNECOLOGY

## 2021-10-20 RX ORDER — PROGESTERONE 200 MG/1
200 CAPSULE ORAL NIGHTLY
Status: DISCONTINUED | OUTPATIENT
Start: 2021-10-20 | End: 2021-10-23

## 2021-10-20 RX ORDER — INDOMETHACIN 25 MG/1
25 CAPSULE ORAL EVERY 6 HOURS
Status: COMPLETED | OUTPATIENT
Start: 2021-10-20 | End: 2021-10-21

## 2021-10-20 RX ORDER — PROGESTERONE 100 MG/1
100 CAPSULE ORAL DAILY
Status: DISCONTINUED | OUTPATIENT
Start: 2021-10-20 | End: 2021-10-20

## 2021-10-20 RX ORDER — POLYETHYLENE GLYCOL 3350 17 G/17G
17 POWDER, FOR SOLUTION ORAL DAILY
Status: DISCONTINUED | OUTPATIENT
Start: 2021-10-20 | End: 2021-10-23

## 2021-10-20 NOTE — PROGRESS NOTES
RN at bedside to assess patient and fht. Patient reports positive fetal movement overnight. Denies any bleeding or leaking of fluid. Abdomen palpated soft. Pt denies contractions or pain at this time. Monitors tested and applied. POC discussed.  Questions a

## 2021-10-20 NOTE — PROGRESS NOTES
[facility]  Progress Note    Paz Xiao Patient Status:  Observation    1995 MRN [id number]   [facility] 1SW-J Attending Jitendra Wilburn MD   Hosp Day # 0 PCP Stefanie Bardales MD     Subjective:  Ani Finely again  - GBS unknown - s/p ampicillin per protocol, dc now, restart if pt begins to labor again. Will collect GBS swab today. 3. Maternal co-morbidities  - Body mass index is 38.97 kg/m².    - h/o  delivery and h/o recurrent SAB  - h/o oral herpet

## 2021-10-20 NOTE — PROGRESS NOTES
New orders received for GBS culture. Dr. Dany Pisano notified for clarification r/t patient receiving antibiotics. GBS culture obtained and sent to lab. Results pending.

## 2021-10-20 NOTE — PROGRESS NOTES
Patient called RN to room and is \"not feeling well. \" pt states she feels dizzy and pressure in her chest. Mag sulfate was DC at 0900. VS obtained and WNL. fhts spot checked and within WNL. Abdomen palpated soft and pt denies any contractions.  Patient has

## 2021-10-20 NOTE — PROGRESS NOTES
Pt called RN to bedside to help go to bathroom. Pt wanting to have BM. Bedside commode brought to room r/t patients dizziness. Full linen change performed.

## 2021-10-20 NOTE — PROGRESS NOTES
Received report from [place], [facility]. Assumed care of patient.

## 2021-10-21 PROCEDURE — 99231 SBSQ HOSP IP/OBS SF/LOW 25: CPT | Performed by: OBSTETRICS & GYNECOLOGY

## 2021-10-21 RX ORDER — AMOXICILLIN 500 MG/1
500 CAPSULE ORAL EVERY 8 HOURS SCHEDULED
Status: DISCONTINUED | OUTPATIENT
Start: 2021-10-23 | End: 2021-10-22

## 2021-10-21 RX ORDER — AMPICILLIN 2 G/1
INJECTION, POWDER, FOR SOLUTION INTRAVENOUS
Status: DISPENSED
Start: 2021-10-21 | End: 2021-10-22

## 2021-10-21 RX ORDER — CALCIUM GLUCONATE 94 MG/ML
1 INJECTION, SOLUTION INTRAVENOUS ONCE AS NEEDED
Status: DISCONTINUED | OUTPATIENT
Start: 2021-10-21 | End: 2021-10-22

## 2021-10-21 RX ORDER — AZITHROMYCIN 250 MG/1
250 TABLET, FILM COATED ORAL DAILY
Status: DISCONTINUED | OUTPATIENT
Start: 2021-10-22 | End: 2021-10-22

## 2021-10-21 RX ORDER — 0.9 % SODIUM CHLORIDE 0.9 %
INTRAVENOUS SOLUTION INTRAVENOUS
Status: DISPENSED
Start: 2021-10-21 | End: 2021-10-22

## 2021-10-21 RX ORDER — SODIUM CHLORIDE, SODIUM LACTATE, POTASSIUM CHLORIDE, CALCIUM CHLORIDE 600; 310; 30; 20 MG/100ML; MG/100ML; MG/100ML; MG/100ML
INJECTION, SOLUTION INTRAVENOUS CONTINUOUS
Status: DISCONTINUED | OUTPATIENT
Start: 2021-10-21 | End: 2021-10-22

## 2021-10-21 NOTE — PROGRESS NOTES
Assuming care of th ept. Pt in the bed that is in the low locked position. efm in [facility]. rn x2 at bedside. meds started as charted. poc reviewed with the pt. Pt in agreement.

## 2021-10-21 NOTE — PROGRESS NOTES
Progress note    Called by patient's nurse that she had urge to push and was very uncomfortable.  I arrived to bedside and patient stated she felt like pushing and regular contractions  FHT: baseline 150, mod miranda, + accels, no decels  [place]: one UC noted  RO

## 2021-10-21 NOTE — PROGRESS NOTES
OB PN    Called by RN while I was in the OR that patient felt like she was leaking fluid. She was noted to have a small amount of wetness on her chux with pinkish blood noted. No large gush.  RNs evaluated patient, and saw a small amount of blood tinged muc

## 2021-10-21 NOTE — PROGRESS NOTES
Patient had complained of \"contractions\" feeling in her vagina, back and thighs. Placed on EFM. After a few minutes on the monitor, patient had called me back into the room stating she has the urge to push.  Upon entering room, patient appeared uncomfort

## 2021-10-21 NOTE — PROGRESS NOTES
Patient calls stating \"leaking of fluid. \" Bri Mikayla pad appears wet with small spots of pinkish/ red blood. Dr. Chrissy Banks notified at 14:12. Nitrazine and ferning slide to be collected.

## 2021-10-21 NOTE — PAYOR COMM NOTE
--------------  ADMISSION REVIEW     Payor: Daryn Winters #:  [id number]  Authorization Number: [id number]    Admit date: 10/19/21  Admit time:  2:09 PM            H&P - H&P Note      H&P signed by Terra Santo MD  16 33w0d  4 lb (1.814 kg) M NORMAL SPONT   [place]      Complications:  premature rupture of membranes, onset of labor within 24 hours of rupture, third trimester   3 SAB 2015              Birth Comments: D & C      Complications:  Other - Examination:  General appearance: Well dressed, well nourished in no apparent distress  Neurologic/Psychiatric: Alert and oriented to person, place and time, mood normal, affect appropriate  Chest: Clear to auscultation bilaterally, no rales, wheezing or r hernia without obstruction and without gangrene     Oral herpes simplex infection     Pelvic cramping     Missed menses     BMI 40.0-44.9, adult ([facility] [address]     History of  delivery, currently pregnant     Antepartum anemia     Benign gestational thromboc delivery    Apgar1: Not recorded     Apgar5: Not recorded    Living: Living     Name of Baby 3: Not recorded    Date: 2015             GA: Not recorded     Delivery: Not recorded    Apgar1: Not recorded     Apgar5: Not recorded    Living: Not recorded    she has never smoked.  She has never used smokeless tobacco. She reports that she does not drink alcohol and does not use drugs.     Allergies:  No Known Allergies     Medications:     Current Facility-Administered Medications:   •  lactated ringers infusio tracing -Baseline is 135 bpm.  Moderate variability. No decelerations.   Appropriate for gestational age, category 1  [place] -earlier she had regular contractions but now her contractions are 2/h        Laboratory Data:        Lab Results   Component Value D with the clinical diagnosis of  labor actually give birth within 7 days of presentation.     Screening / Identification  Although the results of observational studies have suggested that knowledge of fetal fibronectin status or cervical length may h with tocolytic therapy. Similarly, no data exist regarding the efficacy of corticosteroid use before viability. However, there may be times when it is appropriate to administer tocolytics before viability.  For example, inhibiting contractions in a patient change, especially those with a cervical dilation of less than 2 cm, generally should not be treated with tocolytics.     Corticosteroids  The most beneficial intervention for improvement of  outcomes among patients who give birth  is the ad clinical studies have evaluated the evidence regarding magnesium sulfate, neuroprotection, and  births. A 2009 meta-analysis synthesized the results of the clinical trials of magnesium sulfate for neuroprotection.   Pooling the results of the availa therapy with tocolytics is ineffective for preventing  birth and improving  outcomes and is not recommended for this purpose.  A meta-analysis has not shown any differences between magnesium sulfate maintenance therapy and either placebo or b PPROM  4.  Class III obesity complicating pregnancy.       RECOMMENDATIONS:   1. Complete the betamethasone course  2. Continue antibiotics for GBS prophylaxis at this time    3.   Continue magnesium sulfate at 2 g/h at this time with anticipation of disc multiple miscarriages     Astigmatism of both eyes     Umbilical hernia without obstruction and without gangrene     Oral herpes simplex infection     BMI 40.0-44.9, adult ([facility] [address]     History of  delivery, currently pregnant     Antepartum anemia          Current              Complaints:    Contractions: yes    Leaking:no    Bleeding:yes  Trace spotting    Fetal Movement: present         Objective:    10/20/21  1140 10/20/21  1730 10/20/21  2028 10/20/21  2352   BP: 125/62 106/57 101/59 98/53   P she is at high risk for PTL/PTD but unable to predict when/if active labor will begin--but would defer to MFM ultimately   3. Will need 1 hour/CBC as outpatient after steroid effect  4.  H/o PTD: still at high risk for recurrence, will need close f/u as out — 97 17 100/55 97 % — — — SR    10/20/21 0545 — 101 — — 97 % — — — SR    10/20/21 0530 — 100 — — 96 % — — — SR    10/20/21 0515 — 96 — — 97 % — — — SR    10/20/21 0500 — 102 18 103/59 97 % — — — SR    10/20/21 0445 — 109 — — 97 % — — — SR    10/20/21 0430

## 2021-10-21 NOTE — PROGRESS NOTES
[facility]  Progress Note    Bret oHlland Patient Status:  Inpatient    1995 MRN [id number]   [facility] 1SW-J Attending Pat Das MD   Hosp Day # 2 PCP Niharika Muñoz MD     Subjective:  Bret Holland adult [facility])     History of  delivery, currently pregnant     Antepartum anemia     Benign gestational thrombocytopenia, antepartum (HCC)     Abdominal pain in pregnancy     Pregnancy      labor in second trimester      Plan:  30 yo [id number] at

## 2021-10-22 ENCOUNTER — ANESTHESIA (OUTPATIENT)
Dept: OBGYN UNIT | Facility: HOSPITAL | Age: 26
End: 2021-10-22
Payer: MEDICAID

## 2021-10-22 ENCOUNTER — ANESTHESIA EVENT (OUTPATIENT)
Dept: OBGYN UNIT | Facility: HOSPITAL | Age: 26
End: 2021-10-22
Payer: MEDICAID

## 2021-10-22 PROCEDURE — 59409 OBSTETRICAL CARE: CPT | Performed by: OBSTETRICS & GYNECOLOGY

## 2021-10-22 PROCEDURE — 99232 SBSQ HOSP IP/OBS MODERATE 35: CPT | Performed by: OBSTETRICS & GYNECOLOGY

## 2021-10-22 RX ORDER — ACETAMINOPHEN 325 MG/1
650 TABLET ORAL EVERY 6 HOURS PRN
Status: DISCONTINUED | OUTPATIENT
Start: 2021-10-22 | End: 2021-10-24

## 2021-10-22 RX ORDER — SIMETHICONE 80 MG
80 TABLET,CHEWABLE ORAL 3 TIMES DAILY PRN
Status: DISCONTINUED | OUTPATIENT
Start: 2021-10-22 | End: 2021-10-24

## 2021-10-22 RX ORDER — BUPIVACAINE HCL/0.9 % NACL/PF 0.25 %
5 PLASTIC BAG, INJECTION (ML) EPIDURAL AS NEEDED
Status: DISCONTINUED | OUTPATIENT
Start: 2021-10-22 | End: 2021-10-22

## 2021-10-22 RX ORDER — LIDOCAINE HYDROCHLORIDE AND EPINEPHRINE 15; 5 MG/ML; UG/ML
INJECTION, SOLUTION EPIDURAL AS NEEDED
Status: DISCONTINUED | OUTPATIENT
Start: 2021-10-22 | End: 2021-10-22 | Stop reason: SURG

## 2021-10-22 RX ORDER — DOCUSATE SODIUM 100 MG/1
100 CAPSULE, LIQUID FILLED ORAL
Status: DISCONTINUED | OUTPATIENT
Start: 2021-10-23 | End: 2021-10-24

## 2021-10-22 RX ORDER — BISACODYL 10 MG
10 SUPPOSITORY, RECTAL RECTAL ONCE AS NEEDED
Status: DISCONTINUED | OUTPATIENT
Start: 2021-10-22 | End: 2021-10-24

## 2021-10-22 RX ORDER — ENOXAPARIN SODIUM 100 MG/ML
40 INJECTION SUBCUTANEOUS DAILY
Status: DISCONTINUED | OUTPATIENT
Start: 2021-10-23 | End: 2021-10-24

## 2021-10-22 RX ORDER — NALBUPHINE HCL 10 MG/ML
2.5 AMPUL (ML) INJECTION
Status: DISCONTINUED | OUTPATIENT
Start: 2021-10-22 | End: 2021-10-22

## 2021-10-22 RX ORDER — IBUPROFEN 600 MG/1
600 TABLET ORAL EVERY 6 HOURS
Status: DISCONTINUED | OUTPATIENT
Start: 2021-10-23 | End: 2021-10-24

## 2021-10-22 RX ADMIN — LIDOCAINE HYDROCHLORIDE AND EPINEPHRINE 5 ML: 15; 5 INJECTION, SOLUTION EPIDURAL at 21:34:00

## 2021-10-22 NOTE — PROGRESS NOTES
25w5d  No C/O, feels effect of magnesium. Afebrile, VS stable  Presumed PROM, magnesium started 1800 yesterday  Irregular contractions  Will stop magnesium, observe. Restart for neuro protection if labor.   Antibiotic protocol

## 2021-10-22 NOTE — PLAN OF CARE
Problem: BIRTH - VAGINAL/ SECTION  Goal: Fetal and maternal status remain reassuring during the birth process  Description: INTERVENTIONS:  - Monitor vital signs  - Monitor fetal heart rate  - Monitor uterine activity  - Monitor labor progression INTERVENTIONS:  - Assess pt frequently for physical needs  - Identify cognitive and physical deficits and behaviors that affect risk of falls.   - [place] fall precautions as indicated by assessment.  - Educate pt/family on patient safety including physic

## 2021-10-22 NOTE — CONSULTS
[facility]    Maternal Fetal Medicine Progress Note    Anay Fernandez Patient Status:  Inpatient    1995 MRN [id number]   Location [address] [facility] Attending Elías Andrade MD   Hosp Day # 3 PCP Ignacio Junior MD Exam:  Gen: No acute distress, alert and oriented x3  Abd: Gravid abdomen, soft, nontender, non-distended  Cervix: deffered  Ext: SCD's in place    Labs:   Lab Results   Component Value Date    WBC 10.2 10/21/2021    HGB 10.5 10/21/2021    HCT 32.1 10/21/2

## 2021-10-22 NOTE — PROGRESS NOTES
Contractions 6-7 minutes  Increased bloody mucous discharge  FHT's 145, moderate variability, (26 weeks)  Cx: 2-3 cm/505/-3  Probable labor  Magnesium restarted  Epidural when desired

## 2021-10-22 NOTE — PROGRESS NOTES
Dr Shetty [place] called regarding patient feeling rectal pressure. Ok to check patient patient.  Azra Jewell

## 2021-10-22 NOTE — PAYOR COMM NOTE
--------------  CONTINUED STAY REVIEW-----REQUESTING ADDITIONAL DAY 10/22       Payor: Daryn Winters #:  [id number]  Authorization Number: [id number]    Admit date: 10/19/21  Admit time:  2:09 PM    Admitting Lorraine Dose Route User    10/22/2021 1110 Hi-Risk Restarted 2 g/hr Intravenous Larissa Andrade RN      MAGNESIUM SULFATE BOLUS FROM BAG 4 g infusion     Date Action Dose Route User    10/21/2021 1825 Bolus from Bag 4 g Intravenous Gabo Chaudhary RN          Vit 96 % — — —     10/22/21 0000 97.2 °F (36.2 °C) 86 16 — 96 % — — —     10/21/21 2345 — 85 — — 95 % — — —     10/21/21 2330 — 87 — — 95 % — — —     10/21/21 2315 — 91 — 119/61 95 % — — —     10/21/21 2300 — 88 16 — 94 % — — —     10/21/21 2245 —

## 2021-10-22 NOTE — PROGRESS NOTES
OB PN    Notified by RN patient with some increased bloody show/blood tinged fluid and more vaginal pain. Feeling UC's in her back.     O:    10/21/21  2045   BP: 117/64   Pulse: 90   Resp:    Temp:      FHT: baseline 150, mod miranda, + accels, rare mild varia

## 2021-10-22 NOTE — PROGRESS NOTES
At patient bedside adjusting US and TOCO, pad checked under patient, increased bloody show/bright red bleeding.  Patient \"apprears\" more uncomfortable to RN at bedside but does not state she is more uncomfortable than before or that this is \"different\"

## 2021-10-23 NOTE — PLAN OF CARE
Rn at bedside. Report rec'd from [address] [address]. efm adjusted. Iv infusing, magnesium sulfate at  (signed off with nurse) and antibiotics infusing. Magana draining clear yellow fluid. Pt in bed, rating pain 7/10 with contractions, breathing through them.  P

## 2021-10-23 NOTE — PLAN OF CARE
Problem: BIRTH - VAGINAL/ SECTION  Goal: Fetal and maternal status remain reassuring during the birth process  Description: INTERVENTIONS:  - Monitor vital signs  - Monitor fetal heart rate  - Monitor uterine activity  - Monitor labor progression INTERVENTIONS:  - Assess pt frequently for physical needs  - Identify cognitive and physical deficits and behaviors that affect risk of falls.   - [place] fall precautions as indicated by assessment.  - Educate pt/family on patient safety including physic

## 2021-10-23 NOTE — ANESTHESIA PREPROCEDURE EVALUATION
PRE-OP EVALUATION    Patient Name: Speedy Mcguire    Admit Diagnosis: Pregnancy [Z34.90]    Pre-op Diagnosis: * No surgery found *        Anesthesia Procedure: LABOR ANALGESIA    * Surgery not found *    Pre-op vitals reviewed.   Temp: 97.3 °F (36 injection 4 mg, 4 mg, Intravenous, Once  [COMPLETED] lactated ringers IV bolus 500 mL, 500 mL, Intravenous, Once  [COMPLETED] MAGNESIUM SULFATE BOLUS FROM BAG 4 g infusion, 4 g, Intravenous, Once  [COMPLETED] Ampicillin Sodium (OMNIPEN) 2 g in sodium chlor born 7 pound 13 ounce male delivered by .      Social History    Tobacco Use      Smoking status: Never Smoker      Smokeless tobacco: Never Used    Alcohol use: No      Alcohol/week: 0.0 standard drinks      Drug use: No     Available pre-op labs revie

## 2021-10-23 NOTE — CONSULTS
Called to see Tania Dela Cruz and her  Amaury Gonzales at 22 5/7 weeks with a history PTL, PPROM and now PTL again. Mom is a 31 yr  female admitted with PTL, transfer from [place] on 10/19/21. Steroids given 10/19 and 10/20.   Mom s/p abx and indocin and mag common feeding intolerance/dysmotility of prematurity that is expected/anticipated in premature infant. Discussed typical length of stay. Discussed independent nature of our group.   Length of consult 5:00-5:40 pm.

## 2021-10-23 NOTE — PROGRESS NOTES
Labor Analgesia Follow Up Note    Patient underwent epidural anesthesia for labor analgesia,    Placenta Date/Time: 10/22/2021 10:39 PM    Delivery Date/Time[de-identified] 10/22/2021  10:32 PM    /84 (BP Location: Left arm)   Pulse 66   Temp 98.3 °F (36.8 °C) (O

## 2021-10-23 NOTE — PLAN OF CARE
pt unable to ambulate at this time. pericare done in bed, gown changed. Pt transferred to wheelchair.  RN called NICU, unable to visit baby at this time due to medical procedure

## 2021-10-23 NOTE — PLAN OF CARE
Problem: BIRTH - VAGINAL/ SECTION  Goal: Fetal and maternal status remain reassuring during the birth process  Description: INTERVENTIONS:  - Monitor vital signs  - Monitor fetal heart rate  - Monitor uterine activity  - Monitor labor progression Sam Brown RN  Outcome: Completed  10/22/2021 1942 by Sam Brown RN  Outcome: Progressing  Goal: Patient/Family Short Term Goal  Description: Patient's Short Term Goal: patients pain and contractions remain controlled    Interventions:   -

## 2021-10-23 NOTE — L&D DELIVERY NOTE
Iraj Barnes [[id number]]    Labor Events     labor?: Yes   steroids?: Full Course  Antibiotics received during labor?: Yes  Antibiotics (enter # doses in comment): ampicillin, other (Comment: Zithromycin, IVPB amp =5 doses)  Ruptur Respiratory effort:         Total:            disposition: NICU     Skin to Skin    Reason skin to skin not initiated: [place] Acuity     Vaginal Count    Initial count RN: Alexia Sorto RN  Initial count Tech: Daryn Block and/or banked. IV pitocin and gentle uterine massage helped delivery of the placenta. Umbilical cord gases were not sent. Placenta delivered spontaneosly by uterine massage.         Bleeding was minimal.  The patient was then moved to the supine position

## 2021-10-23 NOTE — PLAN OF CARE
Pt states she is feeling increased pain and pressure with urge to push.  SVE done, unchanged from previous exam.

## 2021-10-23 NOTE — ANESTHESIA PROCEDURE NOTES
Labor Analgesia  Performed by: Annie Byers MD  Authorized by: Annie Byers MD       General Information and Staff    Start Time:  10/22/2021 9:27 PM  End Time:  10/22/2021 9:34 PM  Anesthesiologist:  Annie Byers MD  Performed by:   Anesthesiologist

## 2021-10-24 VITALS
RESPIRATION RATE: 18 BRPM | HEIGHT: 63 IN | HEART RATE: 67 BPM | TEMPERATURE: 98 F | BODY MASS INDEX: 38.98 KG/M2 | WEIGHT: 220 LBS | OXYGEN SATURATION: 100 % | SYSTOLIC BLOOD PRESSURE: 139 MMHG | DIASTOLIC BLOOD PRESSURE: 89 MMHG

## 2021-10-24 RX ORDER — IBUPROFEN 600 MG/1
600 TABLET ORAL EVERY 6 HOURS
Qty: 40 TABLET | Refills: 0 | Status: SHIPPED | OUTPATIENT
Start: 2021-10-24

## 2021-10-24 NOTE — PROGRESS NOTES
[address] [facility]  Obstetrics and Gynecology    OB/GYN: Postpartum Progress Note     SUBJECTIVE:  Patient is a 31year old [id number] female who is s/p . She is PPD# 1. Doing well.    Baby in NICU     OBJECTIVE:   10/23/21  0000 10/23/21  0035 10/23/2

## 2021-10-24 NOTE — PROGRESS NOTES
[facility]  Obstetrics and Gynecology    OB/GYN: Postpartum Progress Note     SUBJECTIVE:  Patient is a 31year old [id number] female who is s/p . She is PPD# 2. Doing well.    Baby in NICU     OBJECTIVE:   10/23/21  0035 10/23/21  0600 10/23/2

## 2021-10-25 ENCOUNTER — TELEPHONE (OUTPATIENT)
Dept: CASE MANAGEMENT | Facility: HOSPITAL | Age: 26
End: 2021-10-25

## 2021-10-25 ENCOUNTER — PATIENT OUTREACH (OUTPATIENT)
Dept: CASE MANAGEMENT | Age: 26
End: 2021-10-25

## 2021-10-25 NOTE — PROGRESS NOTES
1st attempt OBGYN Post Partum apt request    Dr Anderson Neighbor  [facility]  [address] [facility]  Gracie Mckeon  [phone]    Apt made:  Fri 12/03 @2:30pm - Nurys Quintanilla w/pt

## 2021-10-27 ENCOUNTER — TELEPHONE (OUTPATIENT)
Dept: OBGYN UNIT | Facility: HOSPITAL | Age: 26
End: 2021-10-27

## 2021-11-01 ENCOUNTER — TELEPHONE (OUTPATIENT)
Dept: OBGYN CLINIC | Facility: CLINIC | Age: 26
End: 2021-11-01

## 2021-11-17 ENCOUNTER — TELEPHONE (OUTPATIENT)
Dept: OBGYN CLINIC | Facility: CLINIC | Age: 26
End: 2021-11-17

## 2021-11-18 NOTE — TELEPHONE ENCOUNTER
Patient notified to call Daryn Richards at [phone] to place an order for her breast pump  She doesn's need a referral.  As soon as she place her order, they will send us an order to fax. Patient verbalizes understanding.

## 2021-12-03 ENCOUNTER — POSTPARTUM (OUTPATIENT)
Dept: OBGYN CLINIC | Facility: CLINIC | Age: 26
End: 2021-12-03
Payer: MEDICAID

## 2021-12-03 VITALS
WEIGHT: 220.19 LBS | SYSTOLIC BLOOD PRESSURE: 124 MMHG | DIASTOLIC BLOOD PRESSURE: 72 MMHG | HEART RATE: 84 BPM | BODY MASS INDEX: 39 KG/M2

## 2021-12-03 PROBLEM — D69.6 BENIGN GESTATIONAL THROMBOCYTOPENIA, ANTEPARTUM (HCC): Status: RESOLVED | Noted: 2021-10-14 | Resolved: 2021-12-03

## 2021-12-03 PROBLEM — O99.019 ANTEPARTUM ANEMIA: Status: RESOLVED | Noted: 2021-10-14 | Resolved: 2021-12-03

## 2021-12-03 PROBLEM — O26.899 ABDOMINAL PAIN IN PREGNANCY: Status: RESOLVED | Noted: 2021-10-15 | Resolved: 2021-12-03

## 2021-12-03 PROBLEM — O60.02 PRETERM LABOR IN SECOND TRIMESTER: Status: RESOLVED | Noted: 2021-10-19 | Resolved: 2021-12-03

## 2021-12-03 PROBLEM — O99.019 ANTEPARTUM ANEMIA (HCC): Status: RESOLVED | Noted: 2021-10-14 | Resolved: 2021-12-03

## 2021-12-03 PROBLEM — Z34.90 PREGNANCY (HCC): Status: RESOLVED | Noted: 2021-10-18 | Resolved: 2021-12-03

## 2021-12-03 PROBLEM — R10.9 ABDOMINAL PAIN IN PREGNANCY (HCC): Status: RESOLVED | Noted: 2021-10-15 | Resolved: 2021-12-03

## 2021-12-03 PROBLEM — O09.899 HISTORY OF PRETERM DELIVERY, CURRENTLY PREGNANT: Status: RESOLVED | Noted: 2021-07-29 | Resolved: 2021-12-03

## 2021-12-03 PROBLEM — O60.02 PRETERM LABOR IN SECOND TRIMESTER (HCC): Status: RESOLVED | Noted: 2021-10-19 | Resolved: 2021-12-03

## 2021-12-03 PROBLEM — O99.119 BENIGN GESTATIONAL THROMBOCYTOPENIA, ANTEPARTUM (HCC): Status: RESOLVED | Noted: 2021-10-14 | Resolved: 2021-12-03

## 2021-12-03 PROBLEM — O26.899 ABDOMINAL PAIN IN PREGNANCY (HCC): Status: RESOLVED | Noted: 2021-10-15 | Resolved: 2021-12-03

## 2021-12-03 PROBLEM — R10.9 ABDOMINAL PAIN IN PREGNANCY: Status: RESOLVED | Noted: 2021-10-15 | Resolved: 2021-12-03

## 2021-12-03 PROBLEM — Z34.90 PREGNANCY: Status: RESOLVED | Noted: 2021-10-18 | Resolved: 2021-12-03

## 2021-12-03 PROBLEM — O09.899 HISTORY OF PRETERM DELIVERY, CURRENTLY PREGNANT (HCC): Status: RESOLVED | Noted: 2021-07-29 | Resolved: 2021-12-03

## 2021-12-03 PROCEDURE — 3074F SYST BP LT 130 MM HG: CPT | Performed by: OBSTETRICS & GYNECOLOGY

## 2021-12-03 PROCEDURE — 3078F DIAST BP <80 MM HG: CPT | Performed by: OBSTETRICS & GYNECOLOGY

## 2021-12-03 NOTE — PROGRESS NOTES
Post Partum  No C/O  Bleeding now, ? Menses  Pumping  Wants tubal ligation, ?  may go for vas, needs urologist    ROS: No Cardiac, Respiratory, GI,  or Neurological symptoms.     Son still in NICU    PE:  Abdomen soft  Pelvic:External vag normal, c

## 2022-02-21 PROBLEM — G43.001 MIGRAINE WITHOUT AURA AND WITH STATUS MIGRAINOSUS, NOT INTRACTABLE: Status: ACTIVE | Noted: 2022-02-21

## 2022-02-21 PROBLEM — G44.52 NPDH (NEW PERSISTENT DAILY HEADACHE): Status: ACTIVE | Noted: 2022-02-21

## 2022-02-21 PROBLEM — R20.0 NUMBNESS AND TINGLING OF BOTH FEET: Status: ACTIVE | Noted: 2022-02-21

## 2022-02-21 PROBLEM — R20.2 NUMBNESS AND TINGLING OF BOTH FEET: Status: ACTIVE | Noted: 2022-02-21

## 2022-03-21 ENCOUNTER — TELEPHONE (OUTPATIENT)
Dept: OBGYN CLINIC | Facility: CLINIC | Age: 27
End: 2022-03-21

## 2022-03-21 ENCOUNTER — OFFICE VISIT (OUTPATIENT)
Dept: OBGYN CLINIC | Facility: CLINIC | Age: 27
End: 2022-03-21
Payer: MEDICAID

## 2022-03-21 VITALS
BODY MASS INDEX: 39 KG/M2 | WEIGHT: 221.81 LBS | DIASTOLIC BLOOD PRESSURE: 78 MMHG | SYSTOLIC BLOOD PRESSURE: 115 MMHG | HEART RATE: 82 BPM

## 2022-03-21 DIAGNOSIS — Z30.09 STERILIZATION CONSULT: Primary | ICD-10-CM

## 2022-03-21 DIAGNOSIS — Z30.2 REQUEST FOR STERILIZATION: Primary | ICD-10-CM

## 2022-03-21 DIAGNOSIS — Z01.419 ENCOUNTER FOR GYNECOLOGICAL EXAMINATION WITHOUT ABNORMAL FINDING: ICD-10-CM

## 2022-03-21 PROCEDURE — 3074F SYST BP LT 130 MM HG: CPT | Performed by: OBSTETRICS & GYNECOLOGY

## 2022-03-21 PROCEDURE — 99214 OFFICE O/P EST MOD 30 MIN: CPT | Performed by: OBSTETRICS & GYNECOLOGY

## 2022-03-21 PROCEDURE — 3078F DIAST BP <80 MM HG: CPT | Performed by: OBSTETRICS & GYNECOLOGY

## 2022-03-21 NOTE — TELEPHONE ENCOUNTER
Please schedule the following surgery:    Procedure: right and left salpingectomy/operative laparoscopy   Assist: (Y/N or none)  Yes  Request donovan amanda  Date: coordinate with pt   Dx: voluntary permanent sterilization  Pre-op appt: (Y/N or n/a)no  Admission type: (IN/OUT/OBVS)  Department of discharge(SDS/Floor):  Expected length of stay: outpt  Procedure length time (please enter amount you are requesting): 1 hour  Recovery time (patients always ask):  Medical Clearance: (Y/N)  no    ALL Medicaid/including BCBS community: Tubal/ Hyst form MUST be signed (30 days):     COVID19 Lab 5062 needs to be placed for all C-sections, IOL & Versions     Message to nurses:

## 2022-03-21 NOTE — TELEPHONE ENCOUNTER
Called and spoke to pt. I informed pt that with her insurance she will need to sign an [facility] sterilization consent. Pt reports that she lives closer to [facility] and can go there to sign today. Will arrive at office between 1pm-4pm. Office aware.

## 2022-03-22 NOTE — TELEPHONE ENCOUNTER
Pt signed sterilization consent on 3/21. OR has 1230pm available on 4/26. Is this ok to schedule. You have office hours from 8-12.

## 2022-03-23 NOTE — TELEPHONE ENCOUNTER
Per [employer], no 1228 available. Is 1230 ok to schedule or would you like me to check other dates?

## 2022-03-24 NOTE — TELEPHONE ENCOUNTER
Called and spoke to pt. Pt agreed with surgery date and time.  Minor case letter sent to pt's mychart.     -assist pending

## 2022-04-25 ENCOUNTER — TELEPHONE (OUTPATIENT)
Dept: OBGYN CLINIC | Facility: CLINIC | Age: 27
End: 2022-04-25

## 2022-04-25 ENCOUNTER — LAB ENCOUNTER (OUTPATIENT)
Dept: LAB | Facility: HOSPITAL | Age: 27
End: 2022-04-25
Attending: OBSTETRICS & GYNECOLOGY
Payer: MEDICAID

## 2022-04-25 DIAGNOSIS — Z01.818 PREOP TESTING: ICD-10-CM

## 2022-04-25 LAB — SARS-COV-2 RNA RESP QL NAA+PROBE: NOT DETECTED

## 2022-04-25 NOTE — TELEPHONE ENCOUNTER
Called PAT and was informed that COVID test done today should be back in time for surgery tomorrow. Pt informed and verbalized understanding.

## 2022-04-25 NOTE — TELEPHONE ENCOUNTER
Pt is calling has procedure tomorrow letting you know she had covid test done today.   Does she need a rapid also since she is there

## 2022-04-26 ENCOUNTER — ANESTHESIA EVENT (OUTPATIENT)
Dept: SURGERY | Facility: HOSPITAL | Age: 27
End: 2022-04-26
Payer: MEDICAID

## 2022-04-26 ENCOUNTER — ANESTHESIA (OUTPATIENT)
Dept: SURGERY | Facility: HOSPITAL | Age: 27
End: 2022-04-26
Payer: MEDICAID

## 2022-04-26 ENCOUNTER — HOSPITAL ENCOUNTER (OUTPATIENT)
Facility: HOSPITAL | Age: 27
Setting detail: HOSPITAL OUTPATIENT SURGERY
Discharge: HOME | End: 2022-04-26
Attending: OBSTETRICS & GYNECOLOGY | Admitting: OBSTETRICS & GYNECOLOGY
Payer: MEDICAID

## 2022-04-26 VITALS
BODY MASS INDEX: 38.98 KG/M2 | SYSTOLIC BLOOD PRESSURE: 136 MMHG | HEIGHT: 63 IN | DIASTOLIC BLOOD PRESSURE: 67 MMHG | OXYGEN SATURATION: 98 % | WEIGHT: 220 LBS | RESPIRATION RATE: 14 BRPM | TEMPERATURE: 98 F | HEART RATE: 70 BPM

## 2022-04-26 DIAGNOSIS — Z30.2 REQUEST FOR STERILIZATION: ICD-10-CM

## 2022-04-26 DIAGNOSIS — Z01.818 PREOP TESTING: Primary | ICD-10-CM

## 2022-04-26 LAB — B-HCG UR QL: NEGATIVE

## 2022-04-26 PROCEDURE — 0UT74ZZ RESECTION OF BILATERAL FALLOPIAN TUBES, PERCUTANEOUS ENDOSCOPIC APPROACH: ICD-10-PCS | Performed by: OBSTETRICS & GYNECOLOGY

## 2022-04-26 PROCEDURE — 88302 TISSUE EXAM BY PATHOLOGIST: CPT | Performed by: OBSTETRICS & GYNECOLOGY

## 2022-04-26 PROCEDURE — 81025 URINE PREGNANCY TEST: CPT

## 2022-04-26 RX ORDER — NEOSTIGMINE METHYLSULFATE 1 MG/ML
INJECTION INTRAVENOUS AS NEEDED
Status: DISCONTINUED | OUTPATIENT
Start: 2022-04-26 | End: 2022-04-26 | Stop reason: SURG

## 2022-04-26 RX ORDER — MORPHINE SULFATE 4 MG/ML
4 INJECTION, SOLUTION INTRAMUSCULAR; INTRAVENOUS EVERY 10 MIN PRN
Status: DISCONTINUED | OUTPATIENT
Start: 2022-04-26 | End: 2022-04-26

## 2022-04-26 RX ORDER — HYDROCODONE BITARTRATE AND ACETAMINOPHEN 5; 325 MG/1; MG/1
1 TABLET ORAL AS NEEDED
Status: DISCONTINUED | OUTPATIENT
Start: 2022-04-26 | End: 2022-04-26

## 2022-04-26 RX ORDER — ONDANSETRON 2 MG/ML
INJECTION INTRAMUSCULAR; INTRAVENOUS AS NEEDED
Status: DISCONTINUED | OUTPATIENT
Start: 2022-04-26 | End: 2022-04-26 | Stop reason: SURG

## 2022-04-26 RX ORDER — METOCLOPRAMIDE 10 MG/1
10 TABLET ORAL ONCE
Status: COMPLETED | OUTPATIENT
Start: 2022-04-26 | End: 2022-04-26

## 2022-04-26 RX ORDER — BUPIVACAINE HYDROCHLORIDE 2.5 MG/ML
INJECTION, SOLUTION EPIDURAL; INFILTRATION; INTRACAUDAL AS NEEDED
Status: DISCONTINUED | OUTPATIENT
Start: 2022-04-26 | End: 2022-04-26 | Stop reason: HOSPADM

## 2022-04-26 RX ORDER — ACETAMINOPHEN 500 MG
1000 TABLET ORAL ONCE
Status: COMPLETED | OUTPATIENT
Start: 2022-04-26 | End: 2022-04-26

## 2022-04-26 RX ORDER — HYDROCODONE BITARTRATE AND ACETAMINOPHEN 5; 325 MG/1; MG/1
2 TABLET ORAL AS NEEDED
Status: DISCONTINUED | OUTPATIENT
Start: 2022-04-26 | End: 2022-04-26

## 2022-04-26 RX ORDER — MIDAZOLAM HYDROCHLORIDE 1 MG/ML
INJECTION INTRAMUSCULAR; INTRAVENOUS AS NEEDED
Status: DISCONTINUED | OUTPATIENT
Start: 2022-04-26 | End: 2022-04-26 | Stop reason: SURG

## 2022-04-26 RX ORDER — NALOXONE HYDROCHLORIDE 0.4 MG/ML
80 INJECTION, SOLUTION INTRAMUSCULAR; INTRAVENOUS; SUBCUTANEOUS AS NEEDED
Status: DISCONTINUED | OUTPATIENT
Start: 2022-04-26 | End: 2022-04-26

## 2022-04-26 RX ORDER — HYDROMORPHONE HYDROCHLORIDE 1 MG/ML
0.6 INJECTION, SOLUTION INTRAMUSCULAR; INTRAVENOUS; SUBCUTANEOUS EVERY 5 MIN PRN
Status: DISCONTINUED | OUTPATIENT
Start: 2022-04-26 | End: 2022-04-26

## 2022-04-26 RX ORDER — DEXAMETHASONE SODIUM PHOSPHATE 4 MG/ML
VIAL (ML) INJECTION AS NEEDED
Status: DISCONTINUED | OUTPATIENT
Start: 2022-04-26 | End: 2022-04-26 | Stop reason: SURG

## 2022-04-26 RX ORDER — MORPHINE SULFATE 10 MG/ML
6 INJECTION, SOLUTION INTRAMUSCULAR; INTRAVENOUS EVERY 10 MIN PRN
Status: DISCONTINUED | OUTPATIENT
Start: 2022-04-26 | End: 2022-04-26

## 2022-04-26 RX ORDER — ONDANSETRON 2 MG/ML
4 INJECTION INTRAMUSCULAR; INTRAVENOUS ONCE AS NEEDED
Status: COMPLETED | OUTPATIENT
Start: 2022-04-26 | End: 2022-04-26

## 2022-04-26 RX ORDER — SODIUM CHLORIDE, SODIUM LACTATE, POTASSIUM CHLORIDE, CALCIUM CHLORIDE 600; 310; 30; 20 MG/100ML; MG/100ML; MG/100ML; MG/100ML
INJECTION, SOLUTION INTRAVENOUS CONTINUOUS
Status: DISCONTINUED | OUTPATIENT
Start: 2022-04-26 | End: 2022-04-26

## 2022-04-26 RX ORDER — FAMOTIDINE 20 MG/1
20 TABLET, FILM COATED ORAL ONCE
Status: COMPLETED | OUTPATIENT
Start: 2022-04-26 | End: 2022-04-26

## 2022-04-26 RX ORDER — PROCHLORPERAZINE EDISYLATE 5 MG/ML
5 INJECTION INTRAMUSCULAR; INTRAVENOUS ONCE AS NEEDED
Status: DISCONTINUED | OUTPATIENT
Start: 2022-04-26 | End: 2022-04-26

## 2022-04-26 RX ORDER — HYDROMORPHONE HYDROCHLORIDE 1 MG/ML
0.2 INJECTION, SOLUTION INTRAMUSCULAR; INTRAVENOUS; SUBCUTANEOUS EVERY 5 MIN PRN
Status: DISCONTINUED | OUTPATIENT
Start: 2022-04-26 | End: 2022-04-26

## 2022-04-26 RX ORDER — ROCURONIUM BROMIDE 10 MG/ML
INJECTION, SOLUTION INTRAVENOUS AS NEEDED
Status: DISCONTINUED | OUTPATIENT
Start: 2022-04-26 | End: 2022-04-26 | Stop reason: SURG

## 2022-04-26 RX ORDER — HYDROMORPHONE HYDROCHLORIDE 1 MG/ML
0.4 INJECTION, SOLUTION INTRAMUSCULAR; INTRAVENOUS; SUBCUTANEOUS EVERY 5 MIN PRN
Status: DISCONTINUED | OUTPATIENT
Start: 2022-04-26 | End: 2022-04-26

## 2022-04-26 RX ORDER — HYDROCODONE BITARTRATE AND ACETAMINOPHEN 5; 325 MG/1; MG/1
1 TABLET ORAL EVERY 6 HOURS PRN
Qty: 12 TABLET | Refills: 0 | Status: SHIPPED | OUTPATIENT
Start: 2022-04-26

## 2022-04-26 RX ORDER — GLYCOPYRROLATE 0.2 MG/ML
INJECTION, SOLUTION INTRAMUSCULAR; INTRAVENOUS AS NEEDED
Status: DISCONTINUED | OUTPATIENT
Start: 2022-04-26 | End: 2022-04-26 | Stop reason: SURG

## 2022-04-26 RX ORDER — MORPHINE SULFATE 4 MG/ML
2 INJECTION, SOLUTION INTRAMUSCULAR; INTRAVENOUS EVERY 10 MIN PRN
Status: DISCONTINUED | OUTPATIENT
Start: 2022-04-26 | End: 2022-04-26

## 2022-04-26 RX ADMIN — GLYCOPYRROLATE 0.8 MG: 0.2 INJECTION, SOLUTION INTRAMUSCULAR; INTRAVENOUS at 14:13:00

## 2022-04-26 RX ADMIN — MIDAZOLAM HYDROCHLORIDE 2 MG: 1 INJECTION INTRAMUSCULAR; INTRAVENOUS at 13:23:00

## 2022-04-26 RX ADMIN — ROCURONIUM BROMIDE 40 MG: 10 INJECTION, SOLUTION INTRAVENOUS at 13:26:00

## 2022-04-26 RX ADMIN — NEOSTIGMINE METHYLSULFATE 5 MG: 1 INJECTION INTRAVENOUS at 14:13:00

## 2022-04-26 RX ADMIN — ONDANSETRON 4 MG: 2 INJECTION INTRAMUSCULAR; INTRAVENOUS at 14:13:00

## 2022-04-26 RX ADMIN — DEXAMETHASONE SODIUM PHOSPHATE 4 MG: 4 MG/ML VIAL (ML) INJECTION at 13:35:00

## 2022-04-26 NOTE — ANESTHESIA PROCEDURE NOTES
Airway  Date/Time: 4/26/2022 1:29 PM  Urgency: Elective      General Information and Staff    Patient location during procedure: OR  Anesthesiologist: Brent Mills MD  Performed: anesthesiologist     Indications and Patient Condition  Indications for airway management: anesthesia  Sedation level: deep  Preoxygenated: yes  Patient position: sniffing  Mask difficulty assessment: 1 - vent by mask    Final Airway Details  Final airway type: endotracheal airway      Successful airway: ETT  Cuffed: yes   Successful intubation technique: direct laryngoscopy  Endotracheal tube insertion site: oral  Blade: Josh  Blade size: #4  ETT size (mm): 7.0    Cormack-Lehane Classification: grade I - full view of glottis  Placement verified by: chest auscultation and capnometry   Measured from: teeth  ETT to teeth (cm): 20  Number of attempts at approach: 1

## 2022-04-26 NOTE — BRIEF OP NOTE
Pre-Operative Diagnosis: Request for sterilization [Z30.2]     Post-Operative Diagnosis: Request for sterilization [Z30.2]      Procedure Performed:   right and left salpingectomy/operative laparoscopy     Surgeon(s) and Role:     * Bharati Mckee MD - Primary    Assistant(s):  Surgical Assistant.: Jovanna Membreno     Surgical Findings: normal pelvis     Specimen: right tube, left tube. Estimated Blood Loss: No data recorded    Dictation Number:  pending    James Tovar MD  4/26/2022  2:30 PM

## 2022-04-27 ENCOUNTER — TELEPHONE (OUTPATIENT)
Dept: OBGYN CLINIC | Facility: CLINIC | Age: 27
End: 2022-04-27

## 2022-04-27 NOTE — OPERATIVE REPORT
[facility]    PATIENT'S NAME: JORGITO Deleon   ATTENDING PHYSICIAN: James Silva MD   OPERATING PHYSICIAN: Junious Cowden A. Candice Huron, MD   PATIENT ACCOUNT#:   [de-identified]    LOCATION:  [place] [address]  MEDICAL RECORD #:   [id number]       YOB: 1995  ADMISSION DATE:       2022      OPERATION DATE:  2022    OPERATIVE REPORT      PREOPERATIVE DIAGNOSIS:  Voluntary permanent sterilization. POSTOPERATIVE DIAGNOSIS:  Voluntary permanent sterilization. PROCEDURE:    1. Operative laparoscopy. 2.   Right salpingectomy. 3.   Left salpingectomy. ASSISTANT:  SEN Kline. COMPLICATIONS:  None. ESTIMATED BLOOD LOSS:  Less than 5 mL. CONDITION:  Patient tolerated the procedure well and was taken to [facility] in good condition. INDICATIONS:  The patient is a 48-year-old multiparous female who requested voluntary permanent sterilization. The patient stated that she has had a 25-week  delivery and does not wish any further fertility due to high risk nature of prior pregnancy and was extensively counseled on the risks, benefits, and alternatives of a voluntary permanent sterilization by operative laparoscopy, and right and left salpingectomy including the risk of bleeding, infection, damage to internal organs (including bowel, bladder, uterus, ovaries, tubes, cervix, vagina, ureters, blood vessels, among other organs), risk of anesthesia, risk of thromboembolic disease such as DVT, PE, MI or stroke, risk of hemorrhage and blood transfusion, risk of regret after sterilization procedure and the patient voiced understanding of all the above and all questions were answered, and she repeated her desire to proceed today with an operative laparoscopy, right salpingectomy, left salpingectomy. All questions were answered. Preop pregnancy test was negative. FINDINGS:  Normal-appearing pelvis.      OPERATIVE TECHNIQUE:  The patient was taken to the operating room, and after induction of general endotracheal anesthesia, the patient was prepped and draped in usual sterile fashion. Magana catheter was placed in the bladder for drainage. Sequential compression devices were operative prior to procedure, were operative during the procedure, and were operative postop as well for DVT prophylaxis. Exam under anesthesia was performed. A sterile speculum was placed in the vagina. A single-tooth tenaculum was placed on the anterior lip of the cervix, and the cervix was gently dilated. A HUMI uterine manipulator was then inserted in the usual fashion without difficulty and the tenaculum removed. Attention was then turned to the abdomen. A knife was used to perform a 5 mm skin incision in the umbilicus through which the 5 mm bladeless disposable laparoscopic trocar under direct visualization was inserted to the intraperitoneal cavity in the usual fashion. The patient was placed in semi-Trendelenburg position. The Magana was draining normally. The abdomen was then fully insufflated. The right and left lower quadrants were transilluminated, and the 5 mm bladeless disposable laparoscopic trocars were placed through 5 mm skin incisions under direct visualization. The uterus was elevated using the HUMI, and the left fallopian tube was elevated, and the left fallopian tube was excised in its entirety in usual fashion using the ligature and Enseal bipolar device. Excellent hemostasis was noted. Great care was taken to avoid surrounding organs and tissue. Attention was turned to the right fallopian tube which was elevated and identified in a usual fashion. The bipolar device was used to excise the right fallopian tube in the usual fashion avoiding the surrounding tissues and organs. Excellent hemostasis was noted here as well.   At this time, excellent hemostasis was noted, and the laparoscopic instruments were then withdrawn, and the abdomen was fully desufflated in the usual fashion. Then, 10 mL of 0.25% Marcaine was injected around the incisions which were closed with 4-0 Vicryl suture followed by Dermabond. All counts were correct. The HUMI uterine manipulator was deflated, removed, and excellent hemostasis noted. All counts were correct. The Magana was deflated and removed, and patient tolerated the procedure well and was taken to [facility] in good condition. The patient's  was appraised of all the above and all questions were answered. Dictated By Kristi Valverde MD  d: 04/26/2022 17:20:49  t: 04/26/2022 20:59:52  Job [id number]  AAS/

## 2022-04-27 NOTE — TELEPHONE ENCOUNTER
Pt has surgery with Dr Razia Asencio yesterday has some question and would  Also like to know when to book post op

## 2022-04-27 NOTE — TELEPHONE ENCOUNTER
Patient name and  verified. Patient asking when to follow up with ASJ for post op visit. Appt made for . Patient also asking about lifting restrictions. States she has a 11 month old at home who weighs about 15lbs. Asking if ok to carry baby? Advised patient should be ok to carry baby but will confirm with ASJ.  Please advise

## 2022-04-28 NOTE — TELEPHONE ENCOUNTER
Patient informed of AS[facility] message. Encouraged to keep appt and ASJ will review/ lift restrictions. Verbalized understanding.

## 2022-04-29 ENCOUNTER — HOSPITAL ENCOUNTER (EMERGENCY)
Facility: HOSPITAL | Age: 27
Discharge: HOME | End: 2022-04-29
Attending: EMERGENCY MEDICINE
Payer: MEDICAID

## 2022-04-29 VITALS
WEIGHT: 220 LBS | HEART RATE: 90 BPM | OXYGEN SATURATION: 96 % | BODY MASS INDEX: 39 KG/M2 | DIASTOLIC BLOOD PRESSURE: 78 MMHG | SYSTOLIC BLOOD PRESSURE: 124 MMHG | RESPIRATION RATE: 14 BRPM | TEMPERATURE: 99 F

## 2022-04-29 DIAGNOSIS — S16.1XXA STRAIN OF NECK MUSCLE, INITIAL ENCOUNTER: Primary | ICD-10-CM

## 2022-04-29 DIAGNOSIS — M54.12 CERVICAL RADICULOPATHY: ICD-10-CM

## 2022-04-29 PROCEDURE — 96372 THER/PROPH/DIAG INJ SC/IM: CPT

## 2022-04-29 PROCEDURE — 99283 EMERGENCY DEPT VISIT LOW MDM: CPT

## 2022-04-29 PROCEDURE — 93010 ELECTROCARDIOGRAM REPORT: CPT | Performed by: EMERGENCY MEDICINE

## 2022-04-29 PROCEDURE — 93005 ELECTROCARDIOGRAM TRACING: CPT

## 2022-04-29 RX ORDER — ORPHENADRINE CITRATE 100 MG/1
100 TABLET, EXTENDED RELEASE ORAL 2 TIMES DAILY
Qty: 10 TABLET | Refills: 0 | Status: SHIPPED | OUTPATIENT
Start: 2022-04-29 | End: 2022-05-04

## 2022-04-29 RX ORDER — DIAZEPAM 5 MG/1
5 TABLET ORAL ONCE
Status: COMPLETED | OUTPATIENT
Start: 2022-04-29 | End: 2022-04-29

## 2022-04-29 RX ORDER — NAPROXEN 500 MG/1
500 TABLET ORAL 2 TIMES DAILY WITH MEALS
Qty: 10 TABLET | Refills: 0 | Status: SHIPPED | OUTPATIENT
Start: 2022-04-29 | End: 2022-05-04

## 2022-04-29 RX ORDER — KETOROLAC TROMETHAMINE 30 MG/ML
30 INJECTION, SOLUTION INTRAMUSCULAR; INTRAVENOUS ONCE
Status: COMPLETED | OUTPATIENT
Start: 2022-04-29 | End: 2022-04-29

## 2022-04-29 RX ORDER — LIDOCAINE 50 MG/G
1 PATCH TOPICAL EVERY 24 HOURS
Qty: 6 PATCH | Refills: 0 | Status: SHIPPED | OUTPATIENT
Start: 2022-04-29 | End: 2022-05-05

## 2022-04-29 NOTE — ED INITIAL ASSESSMENT (HPI)
Pt c/o left arm pain since Wednesday. Pt states that pain woke her up from sleep tonight. Denies chest pain. Denies known injury/trauma.

## 2022-05-13 ENCOUNTER — OFFICE VISIT (OUTPATIENT)
Dept: OBGYN CLINIC | Facility: CLINIC | Age: 27
End: 2022-05-13
Payer: MEDICAID

## 2022-05-13 VITALS — WEIGHT: 223.81 LBS | DIASTOLIC BLOOD PRESSURE: 70 MMHG | BODY MASS INDEX: 40 KG/M2 | SYSTOLIC BLOOD PRESSURE: 114 MMHG

## 2022-05-13 DIAGNOSIS — Z98.890 POSTOPERATIVE STATE: Primary | ICD-10-CM

## 2022-05-13 PROCEDURE — 3078F DIAST BP <80 MM HG: CPT | Performed by: OBSTETRICS & GYNECOLOGY

## 2022-05-13 PROCEDURE — 3074F SYST BP LT 130 MM HG: CPT | Performed by: OBSTETRICS & GYNECOLOGY

## 2022-05-13 PROCEDURE — 99024 POSTOP FOLLOW-UP VISIT: CPT | Performed by: OBSTETRICS & GYNECOLOGY

## 2022-05-13 NOTE — PROGRESS NOTES
Postop check    Pt doing well. No c/o    Alert and oriented, and  abd soft, nontender, laparoscopic incisions healed fully  Ext nt    A/p Postop check pt doing well.

## 2022-10-26 ENCOUNTER — OFFICE VISIT (OUTPATIENT)
Dept: OBGYN CLINIC | Facility: CLINIC | Age: 27
End: 2022-10-26
Payer: MEDICAID

## 2022-10-26 VITALS — WEIGHT: 229 LBS | DIASTOLIC BLOOD PRESSURE: 74 MMHG | BODY MASS INDEX: 41 KG/M2 | SYSTOLIC BLOOD PRESSURE: 105 MMHG

## 2022-10-26 DIAGNOSIS — N64.4 BREAST PAIN, LEFT: Primary | ICD-10-CM

## 2022-10-26 PROCEDURE — 99213 OFFICE O/P EST LOW 20 MIN: CPT | Performed by: NURSE PRACTITIONER

## 2022-10-26 PROCEDURE — 3074F SYST BP LT 130 MM HG: CPT | Performed by: NURSE PRACTITIONER

## 2022-10-26 PROCEDURE — 3078F DIAST BP <80 MM HG: CPT | Performed by: NURSE PRACTITIONER

## 2022-11-09 ENCOUNTER — HOSPITAL ENCOUNTER (OUTPATIENT)
Dept: ULTRASOUND IMAGING | Facility: HOSPITAL | Age: 27
Discharge: HOME | End: 2022-11-09
Attending: NURSE PRACTITIONER
Payer: MEDICAID

## 2022-11-09 DIAGNOSIS — N64.4 BREAST PAIN, LEFT: ICD-10-CM

## 2022-11-09 PROCEDURE — 76642 ULTRASOUND BREAST LIMITED: CPT | Performed by: NURSE PRACTITIONER

## 2023-01-18 NOTE — PROGRESS NOTES
HPI:   Hasmukh Kearns is a 24year old female who presents for a hx of irregular bleeding, pt with hx of paragard IUD. Pt stated had irregular menses in dec 2016. Pt wants to check iud today, plus wants help with her menses.        Wt Readings f Response from PCP:  Diabetes  (Newest Message First)  Radha Murray MD  You      KK  Glipizide would be safest.      Will plan to transition to Glipizide if pt/family in agreement.     Rosaline Patrick RN Diabetes Educator,  [phone]  1/18/2023 at 9:33 AM     Smokeless Status: Never Used                        Alcohol Use: No                     REVIEW OF SYSTEMS:   GENERAL: feels well otherwise  SKIN: denies any unusual skin lesions  EYES:denies blurred vision or double vision  HEENT: denies nasal congestion, The patient indicates understanding of these issues and agrees to the plan. The patient is asked to return for annual/pap.

## 2023-10-13 ENCOUNTER — HOSPITAL ENCOUNTER (EMERGENCY)
Facility: HOSPITAL | Age: 28
Discharge: HOME | End: 2023-10-13
Attending: EMERGENCY MEDICINE
Payer: MEDICAID

## 2023-10-13 ENCOUNTER — APPOINTMENT (OUTPATIENT)
Dept: CT IMAGING | Facility: HOSPITAL | Age: 28
End: 2023-10-13
Attending: EMERGENCY MEDICINE
Payer: MEDICAID

## 2023-10-13 VITALS
HEIGHT: 63 IN | SYSTOLIC BLOOD PRESSURE: 129 MMHG | RESPIRATION RATE: 18 BRPM | HEART RATE: 75 BPM | TEMPERATURE: 98 F | BODY MASS INDEX: 40.75 KG/M2 | WEIGHT: 230 LBS | OXYGEN SATURATION: 98 % | DIASTOLIC BLOOD PRESSURE: 84 MMHG

## 2023-10-13 DIAGNOSIS — G44.209 TENSION HEADACHE: Primary | ICD-10-CM

## 2023-10-13 LAB — B-HCG UR QL: NEGATIVE

## 2023-10-13 PROCEDURE — 70450 CT HEAD/BRAIN W/O DYE: CPT | Performed by: EMERGENCY MEDICINE

## 2023-10-13 PROCEDURE — 99283 EMERGENCY DEPT VISIT LOW MDM: CPT

## 2023-10-13 PROCEDURE — 99284 EMERGENCY DEPT VISIT MOD MDM: CPT

## 2023-10-13 PROCEDURE — 81025 URINE PREGNANCY TEST: CPT

## 2023-10-14 NOTE — DISCHARGE INSTRUCTIONS
Try to stay well-hydrated at home. Please return to the emergency department if you develop worsening of your headache or new headache which is severe, associated with vision changes, associated with neck stiffness or fever, or if it is different from any other headache that you have had before. Return to the emergency department if you develop numbness, weakness or tingling or problems with coordination, or if you develop severe nausea and vomiting and are unable to keep down fluids at home. As discussed, you should take ibuprofen (also called Advil or Motrin), or naproxen (also called Aleve) for your pain. If you are taking ibuprofen, you can take 600 mg every 6 hours, or 800 mg every 8 hours. If you are taking naproxen, you can take 500 mg every 12 hours. Do not take more than this amount as it can cause kidney problems or bleeding into your stomach. Do not take these medications at the same time as it can increase your risk for the side effects. If you have a history of heart problems or have had uncontrolled blood pressure for a significant period of time, he should not take these medications as it can increase your risk for heart attack or stroke. Please take acetaminophen (also called Tylenol) for your pain. You can take 1 g every 4-6 hours. Do not take more than 3 g over the course of a day from all medications you take; it is a common ingredient and other medications (such as Norco, Vicodin, Tylenol 3, etc.), so please read the labels carefully. If you are only taking Tylenol, doses every 6 hours. If you have a history of liver disease you should not take Tylenol as it can worsen your liver function.

## 2023-10-14 NOTE — ED INITIAL ASSESSMENT (HPI)
Patient reports headache and bilateral ear pain x 3 days with nausea. Symptoms have been intermittent.

## 2023-12-30 NOTE — ED NOTES
- Taking levothyroxine. Continue management with prescribing provider.      Patient is resting comfortably.

## 2024-01-11 ENCOUNTER — APPOINTMENT (OUTPATIENT)
Dept: GENERAL RADIOLOGY | Facility: HOSPITAL | Age: 29
End: 2024-01-11
Attending: EMERGENCY MEDICINE
Payer: MEDICAID

## 2024-01-11 ENCOUNTER — HOSPITAL ENCOUNTER (EMERGENCY)
Facility: HOSPITAL | Age: 29
Discharge: HOME | End: 2024-01-11
Attending: EMERGENCY MEDICINE
Payer: MEDICAID

## 2024-01-11 VITALS
OXYGEN SATURATION: 99 % | SYSTOLIC BLOOD PRESSURE: 104 MMHG | TEMPERATURE: 98 F | RESPIRATION RATE: 18 BRPM | BODY MASS INDEX: 40.75 KG/M2 | WEIGHT: 230 LBS | HEART RATE: 85 BPM | HEIGHT: 63 IN | DIASTOLIC BLOOD PRESSURE: 77 MMHG

## 2024-01-11 DIAGNOSIS — R07.89 CHEST PAIN, ATYPICAL: Primary | ICD-10-CM

## 2024-01-11 LAB
ANION GAP SERPL CALC-SCNC: 5 MMOL/L (ref 0–18)
B-HCG UR QL: NEGATIVE
BASOPHILS # BLD AUTO: 0.04 X10(3) UL (ref 0–0.2)
BASOPHILS NFR BLD AUTO: 0.7 %
BUN BLD-MCNC: 13 MG/DL (ref 9–23)
BUN/CREAT SERPL: 18.8 (ref 10–20)
CALCIUM BLD-MCNC: 9.1 MG/DL (ref 8.7–10.4)
CHLORIDE SERPL-SCNC: 107 MMOL/L (ref 98–112)
CO2 SERPL-SCNC: 27 MMOL/L (ref 21–32)
CREAT BLD-MCNC: 0.69 MG/DL
DEPRECATED RDW RBC AUTO: 42.7 FL (ref 35.1–46.3)
EGFRCR SERPLBLD CKD-EPI 2021: 120 ML/MIN/1.73M2 (ref 60–?)
EOSINOPHIL # BLD AUTO: 0.09 X10(3) UL (ref 0–0.7)
EOSINOPHIL NFR BLD AUTO: 1.5 %
ERYTHROCYTE [DISTWIDTH] IN BLOOD BY AUTOMATED COUNT: 13.9 % (ref 11–15)
GLUCOSE BLD-MCNC: 106 MG/DL (ref 70–99)
HCT VFR BLD AUTO: 38.1 %
HGB BLD-MCNC: 12.1 G/DL
IMM GRANULOCYTES # BLD AUTO: 0.01 X10(3) UL (ref 0–1)
IMM GRANULOCYTES NFR BLD: 0.2 %
LYMPHOCYTES # BLD AUTO: 2.36 X10(3) UL (ref 1–4)
LYMPHOCYTES NFR BLD AUTO: 40.1 %
MCH RBC QN AUTO: 26.5 PG (ref 26–34)
MCHC RBC AUTO-ENTMCNC: 31.8 G/DL (ref 31–37)
MCV RBC AUTO: 83.6 FL
MONOCYTES # BLD AUTO: 0.28 X10(3) UL (ref 0.1–1)
MONOCYTES NFR BLD AUTO: 4.8 %
NEUTROPHILS # BLD AUTO: 3.11 X10 (3) UL (ref 1.5–7.7)
NEUTROPHILS # BLD AUTO: 3.11 X10(3) UL (ref 1.5–7.7)
NEUTROPHILS NFR BLD AUTO: 52.7 %
OSMOLALITY SERPL CALC.SUM OF ELEC: 289 MOSM/KG (ref 275–295)
PLATELET # BLD AUTO: 204 10(3)UL (ref 150–450)
POTASSIUM SERPL-SCNC: 3.7 MMOL/L (ref 3.5–5.1)
RBC # BLD AUTO: 4.56 X10(6)UL
SODIUM SERPL-SCNC: 139 MMOL/L (ref 136–145)
TROPONIN I SERPL HS-MCNC: <3 NG/L
WBC # BLD AUTO: 5.9 X10(3) UL (ref 4–11)

## 2024-01-11 PROCEDURE — 36415 COLL VENOUS BLD VENIPUNCTURE: CPT

## 2024-01-11 PROCEDURE — 71045 X-RAY EXAM CHEST 1 VIEW: CPT | Performed by: EMERGENCY MEDICINE

## 2024-01-11 PROCEDURE — 99284 EMERGENCY DEPT VISIT MOD MDM: CPT

## 2024-01-11 PROCEDURE — 99285 EMERGENCY DEPT VISIT HI MDM: CPT

## 2024-01-11 PROCEDURE — 81025 URINE PREGNANCY TEST: CPT

## 2024-01-11 PROCEDURE — 85025 COMPLETE CBC W/AUTO DIFF WBC: CPT | Performed by: EMERGENCY MEDICINE

## 2024-01-11 PROCEDURE — 93005 ELECTROCARDIOGRAM TRACING: CPT

## 2024-01-11 PROCEDURE — 93010 ELECTROCARDIOGRAM REPORT: CPT

## 2024-01-11 PROCEDURE — 84484 ASSAY OF TROPONIN QUANT: CPT | Performed by: EMERGENCY MEDICINE

## 2024-01-11 PROCEDURE — 80048 BASIC METABOLIC PNL TOTAL CA: CPT | Performed by: EMERGENCY MEDICINE

## 2024-01-12 LAB
ATRIAL RATE: 81 BPM
P AXIS: 46 DEGREES
P-R INTERVAL: 136 MS
Q-T INTERVAL: 380 MS
QRS DURATION: 82 MS
QTC CALCULATION (BEZET): 441 MS
R AXIS: 45 DEGREES
T AXIS: 13 DEGREES
VENTRICULAR RATE: 81 BPM

## 2024-01-12 NOTE — DISCHARGE INSTRUCTIONS
Return to the emergency department if you develop severe and persistent chest pain, difficulty breathing, dizziness, leg swelling, or if you are coughing up blood, as these can be signs of a medical emergency.  Please call your doctor for a follow-up appointment in 1 to 3 days to determine the need for further testing.

## 2024-01-12 NOTE — ED QUICK NOTES
Rounding Completed    Plan of Care reviewed. Waiting for all results.   Elimination needs assessed.  Patient denying any need for pain medications. Provided with blanket. Pt denying any other needs at this time.    Bed is locked and in lowest position. Call light within reach.

## 2024-01-12 NOTE — ED INITIAL ASSESSMENT (HPI)
Patient c/o sharp left shoulder pain that started a couple days ago but now concerned because she feels dizzy with visual changes. She denies CP but does feel SOB. Denies any numbness or tingling, or slurred speech. BEFAST negative. Just got over a upper respiratory illness.

## 2024-01-12 NOTE — ED QUICK NOTES
Discharge instructions including follow-up care and signs and symptoms to return to ED were reviewed and discussed with patient. Pt verbalized understanding to all information and all questions asked were answered at this time. Pt is AAOx4, calm, respirations noted as even and unlabored, skin warm and dry, and there are no signs or symptoms of distress noted at this time. Pt ambulatory with a steady gait to exit with family.

## 2024-01-12 NOTE — ED PROVIDER NOTES
Patient Seen in: [facility] Emergency Department      History     Chief Complaint   Patient presents with    Dizziness    Difficulty Breathing     Stated Complaint: Left sholder pain,light headaches    Subjective:   HPI  28 yoF history of obesity, presents for evaluation of left shoulder pain and chest pain.  Symptoms started a couple of days ago.  The pain now radiates from the left shoulder to the left elbow.  She also has a occasional pressure sensation in the chest.  She does report recently getting over the flu.  She also feels lightheaded and sometimes has black spots in her vision.  She feels some shortness of breath.  No numbness tingling or weakness in the arms and legs.  No slurred speech or facial droop.  No trauma.  No fever or chills.  No nausea or vomiting.      Objective:   Past Medical History:   Diagnosis Date    Antepartum anemia 10/14/2021    Appendicitis     per NG: appendectomy,     History of multiple miscarriages     History of Papanicolaou smear of cervix 2014    done per NG    History of pregnancy     nvsd    History of  delivery, currently pregnant 2021    Miscarriage     per NG: D&C    Miscarriage within last 12 months     7 months ago    Obesity, Class II, BMI 35-39.9, isolated (see actual BMI)     BMI 39.25    SAB (spontaneous )     Visual impairment               Past Surgical History:   Procedure Laterality Date    APPENDECTOMY      CHOLECYSTECTOMY      D & C      D & C      per NG:Unknown sex; 8 week      2014    per Nnd degree perineal laceration. Fang was a full term live born 7 pound 13 ounce male delivered by .                Social History     Socioeconomic History    Marital status:    Tobacco Use    Smoking status: Never    Smokeless tobacco: Never   Vaping Use    Vaping Use: Never used   Substance and Sexual Activity    Alcohol use: No     Alcohol/week: 0.0 standard drinks of  alcohol    Drug use: No   Other Topics Concern    Caffeine Concern Yes     Comment: per NG: occasionally              Review of Systems    Positive for stated complaint: Left sholder pain,light headaches  Other systems are as noted in HPI.  Constitutional and vital signs reviewed.      All other systems reviewed and negative except as noted above.    Physical Exam     ED Triage Vitals [01/11/24 1844]   /84   Pulse 80   Resp 20   Temp 98.1 °F (36.7 °C)   Temp src Oral   SpO2 98 %   O2 Device None (Room air)       Current:/77   Pulse 85   Temp 98.1 °F (36.7 °C) (Oral)   Resp 18   Ht 160 cm (5' 3\")   Wt 104.3 kg   LMP 12/02/2023 (Approximate)   SpO2 99%   BMI 40.74 kg/m²         Physical Exam  Vitals and nursing note reviewed.   Constitutional:       Appearance: She is well-developed.   HENT:      Head: Normocephalic and atraumatic.   Eyes:      General: No visual field deficit.     Extraocular Movements: Extraocular movements intact.   Cardiovascular:      Rate and Rhythm: Normal rate and regular rhythm.      Heart sounds: Normal heart sounds.      Comments: Bilateral carotid and radial pulses 2+, no carotid bruit  Pulmonary:      Effort: Pulmonary effort is normal.      Breath sounds: Normal breath sounds.   Abdominal:      General: There is no distension.      Palpations: Abdomen is soft.      Tenderness: There is no abdominal tenderness.   Musculoskeletal:         General: Normal range of motion.      Cervical back: Normal range of motion.   Skin:     General: Skin is warm.      Capillary Refill: Capillary refill takes less than 2 seconds.   Neurological:      Mental Status: She is alert.      GCS: GCS eye subscore is 4. GCS verbal subscore is 5. GCS motor subscore is 6.      Cranial Nerves: No cranial nerve deficit, dysarthria or facial asymmetry.      Sensory: No sensory deficit.      Motor: No weakness.      Coordination: Coordination normal.      Gait: Gait normal.      Comments: No focal  deficits         Differential diagnose includes but is not limited to ACS/MI, PE, pneumonia, hemopneumothorax, costochondritis; CVA versus TIA versus vertebral dissection considered however felt to be unlikely.    ED Course     Labs Reviewed   BASIC METABOLIC PANEL (8) - Abnormal; Notable for the following components:       Result Value    Glucose 106 (*)     All other components within normal limits   TROPONIN I HIGH SENSITIVITY - Normal   POCT PREGNANCY URINE - Normal   CBC WITH DIFFERENTIAL WITH PLATELET    Narrative:     The following orders were created for panel order CBC With Differential With Platelet.  Procedure                               Abnormality         Status                     ---------                               -----------         ------                     CBC W/ DIFFERENTIAL[[id number]]                              Final result                 Please view results for these tests on the individual orders.   CBC W/ DIFFERENTIAL     EKG    Rate, intervals and axes as noted on EKG Report.  Rate: 81  Rhythm: Sinus Rhythm  Reading: No STEMI, no ectopy                 XR CHEST AP PORTABLE  (CPT=71045)    Result Date: 1/11/2024  CONCLUSION: No acute cardiopulmonary disease.    Dictated by (CST): Waylon Riley MD on 1/11/2024 at 8:21 PM     Finalized by (CST): Waylon Riley MD on 1/11/2024 at 8:21 PM                  MDM                                     Medical Decision Making  Vitals are stable in the ED.  EKG negative for acute ischemia.  Per my independent interpretation of chest x-ray there is no pneumonia.  Labs including BMP, CBC and high-sensitivity troponin are unremarkable. PERC negative and with low pretest probability for PE, would not pursue advanced imaging.  Discussed supportive care and outpatient follow-up and she is comfortable with the plan.      Problems Addressed:  Chest pain, atypical: acute illness or injury with systemic symptoms that poses a threat to life or  bodily functions    Amount and/or Complexity of Data Reviewed  Labs: ordered. Decision-making details documented in ED Course.  Radiology: ordered and independent interpretation performed. Decision-making details documented in ED Course.  ECG/medicine tests: ordered and independent interpretation performed. Decision-making details documented in ED Course.    Risk  Decision regarding hospitalization.        Heart Score:    HEART Score      Title      Criteria Score   Age: <45 Age Score: 0   History: Slightly Suspicious Hx Score: 0     EKG: Normal EKG Score: 0   HTN: No   Hypercholesterolemia: No   Atherosclerosis/PVD: No     DM: No   BMI>30kg/m2: Yes   Smoking: No   Family History: Yes         Other Risk Factor Score: 2             Lab Results   Component Value Date    TROP 0.00 10/21/2018    TROPHS <3 01/11/2024           HEART Score: 1        Risk of adverse cardiac event is 0.9-1.7%            Disposition and Plan     Clinical Impression:  1. Chest pain, atypical         Disposition:  Discharge  1/11/2024  8:36 pm    Follow-up:  Destiny Durand MD  [address]  [address]  [facility] [address] [phone]    Follow up in 1 week(s)            Medications Prescribed:  There are no discharge medications for this patient.

## 2024-05-07 ENCOUNTER — OFFICE VISIT (OUTPATIENT)
Dept: OBGYN CLINIC | Facility: CLINIC | Age: 29
End: 2024-05-07
Payer: MEDICAID

## 2024-05-07 VITALS
DIASTOLIC BLOOD PRESSURE: 85 MMHG | HEIGHT: 62 IN | SYSTOLIC BLOOD PRESSURE: 122 MMHG | WEIGHT: 228 LBS | HEART RATE: 78 BPM | BODY MASS INDEX: 41.96 KG/M2

## 2024-05-07 DIAGNOSIS — G89.29 CHRONIC FEMALE PELVIC PAIN: Primary | ICD-10-CM

## 2024-05-07 DIAGNOSIS — R10.2 CHRONIC FEMALE PELVIC PAIN: Primary | ICD-10-CM

## 2024-05-07 DIAGNOSIS — Z87.440 HISTORY OF UTI: ICD-10-CM

## 2024-05-07 DIAGNOSIS — M62.89 PELVIC FLOOR DYSFUNCTION IN FEMALE: ICD-10-CM

## 2024-05-07 PROCEDURE — 99214 OFFICE O/P EST MOD 30 MIN: CPT | Performed by: OBSTETRICS & GYNECOLOGY

## 2024-05-07 RX ORDER — NITROFURANTOIN 25; 75 MG/1; MG/1
CAPSULE ORAL
COMMUNITY
Start: 2024-04-25

## 2024-05-07 NOTE — PROGRESS NOTES
HPI:   Indigo Arias is a 28 year old female who presents for a hx of pelvic floor pain right and left lower quadrant for 1 month. Pt stated she was seen by her pcp and dx with uti,given macrobid, pt finished this 1 week ago and her pain has not resolved. Pt denied any systemic sx/denied dyspaureunia/other sx.     Wt Readings from Last 6 Encounters:   24 228 lb (103.4 kg)   24 230 lb (104.3 kg)   10/13/23 230 lb (104.3 kg)   10/26/22 229 lb (103.9 kg)   22 223 lb 12.8 oz (101.5 kg)   22 220 lb (99.8 kg)     Body mass index is 41.7 kg/m².     Cholesterol (mg/dL)   Date Value   2020 136.00     Direct HDL (mg/dL)   Date Value   2020 46     Calculated LDL (mg/dL)   Date Value   2020 57     AST (U/L)   Date Value   2021 13   2021 18   2021 17   2020 29     ALT (U/L)   Date Value   2021 17   2021 25   2021 21   2020 36 (H)        Current Outpatient Medications   Medication Sig Dispense Refill    nitrofurantoin monohydrate macro 100 MG Oral Cap         Past Medical History:    Antepartum anemia (HCC)    Appendicitis    per NG: appendectomy,     History of multiple miscarriages    History of Papanicolaou smear of cervix    done per NG    History of pregnancy    nvsd    History of  delivery, currently pregnant (HCC)    Miscarriage (HCC)    per NG: D&C    Miscarriage within last 12 months    7 months ago    Obesity, Class II, BMI 35-39.9, isolated (see actual BMI)    BMI 39.25    SAB (spontaneous ) ([facility])    Visual impairment      Past Surgical History:   Procedure Laterality Date    Appendectomy      Cholecystectomy      D & c      D & c      per NG:Unknown sex; 8 week      2014    per Nnd degree perineal laceration. Fang was a full term live born 7 pound 13 ounce male delivered by .      Family History   Problem Relation Age of Onset    Diabetes Father     Heart Disorder  Father     Heart Attack Father     Hypertension Paternal Grandmother     No Known Problems Mother       Social History:   Social History     Socioeconomic History    Marital status:    Tobacco Use    Smoking status: Never    Smokeless tobacco: Never   Vaping Use    Vaping status: Never Used   Substance and Sexual Activity    Alcohol use: No     Alcohol/week: 0.0 standard drinks of alcohol    Drug use: No   Other Topics Concern    Caffeine Concern Yes     Comment: per NG: occasionally            REVIEW OF SYSTEMS:   GENERAL: feels well otherwise  SKIN: denies any unusual skin lesions  EYES:denies blurred vision or double vision  HEENT: denies nasal congestion, sinus pain or ST  LUNGS: denies shortness of breath with exertion  CARDIOVASCULAR: denies chest pain on exertion  GI: denies abdominal pain,denies heartburn  : denies dysuria, vaginal discharge or itching,periods regular   MUSCULOSKELETAL: denies back pain  NEURO: denies headaches  PSYCHE: denies depression or anxiety  HEMATOLOGIC: denies hx of anemia  ENDOCRINE: denies thyroid history  ALL/ASTHMA: denies hx of allergy or asthma    EXAM:   /85 (BP Location: Right arm, Patient Position: Sitting, Cuff Size: large)   Pulse 78   Ht 5' 2\" (1.575 m)   Wt 228 lb (103.4 kg)   LMP 04/30/2024   Breastfeeding No   BMI 41.70 kg/m²   Body mass index is 41.7 kg/m².   GENERAL: well developed, well nourished,in no apparent distress  SKIN: no rashes,no suspicious lesions  HEENT: atraumatic, normocephalic  EYES:normal in appearance  NECK: supple,no adenopathy  CHEST: no chest tenderness  LUNGS: clear to auscultation  CARDIO: RRR without murmur  GI: good BS's,no masses, HSM or tenderness  :introitus is normal,scant discharge,cervix is pink,no adnexal masses or tenderness, right > left pelvic floor spasm no cmt.     MUSCULOSKELETAL: back is not tender,FROM of the back  EXTREMITIES: no cyanosis, clubbing or edema  NEURO: Oriented times three    Component  Ref  Range & Units 4/24/24 12:58 PM   Color  YELLOW YELLOW   Appearance  CLEAR CLOUDY Abnormal    Specific Gravity  1.001 - 1.035 1.023   Ph  5.0 - 8.0 < OR = 5.0   Glucose  NEGATIVE NEGATIVE   Bilirubin  NEGATIVE NEGATIVE   Ketones  NEGATIVE NEGATIVE   Occult Blood  NEGATIVE NEGATIVE   Protein  NEGATIVE NEGATIVE   Nitrite  NEGATIVE NEGATIVE   Leukocyte Esterase  NEGATIVE TRACE Abnormal    Wbc  < OR = 5 /HPF 6-10 Abnormal    Rbc  < OR = 2 /HPF NONE SEEN   Squamous Epithelial Cells  < OR = 5 /HPF 6-10 Abnormal    Transitional Epithelial Cells  < OR = 5 /HPF DNR   Renal Epithelial Cells  < OR = 3 /HPF DNR   Bacteria  NONE SEEN /HPF MODERATE Abnormal    Calcium Oxalate Crystals  NONE OR FEW /HPF DNR   Triple Phosphate Crystals  NONE OR FEW /HPF DNR   Uric Acid Crystals  NONE OR FEW /HPF DNR   Amorphous Sediment  NONE OR FEW /HPF DNR   Crystals  NONE SEEN /HPF DNR   Hyaline Cast  NONE SEEN /LPF NONE SEEN   Granular Cast  NONE SEEN /LPF DNR   Casts  NONE SEEN /LPF DNR   Yeast  NONE SEEN /HPF DNR   Comments DNR   Note SEE COMMENT   Comment: This urine was analyzed for the presence of WBC,  RBC, bacteria, casts, and other formed elements.  Only those elements seen were reported.   Resulting Agency [facility]   Narrative  Performed by [facility]  Performing Organization Information:       [facility]       [address] [place] [address]       Jaspreet Webb    Specimen Collected: 04/24/24 12:58 PM    Performed by: [facility] Last Resulted: 04/25/24  3:16 AM   Received From: [facility]  Result Received: 05/07/24  2:38 PM      ASSESSMENT AND PLAN:   Indigo Arias is a 28 year old female who presents for a .hx of pelvic floor pain right and left lower quadrant for 1 month. Pt stated she was seen by her pcp and dx with uti,given macrobid, pt finished this 1 week ago and her pain has not resolved. Pt denied any systemic  sx/denied dyspaureunia/other sx. The patient is asked to return for an annual visit.

## 2024-06-07 ENCOUNTER — APPOINTMENT (OUTPATIENT)
Dept: GENERAL RADIOLOGY | Facility: HOSPITAL | Age: 29
End: 2024-06-07
Attending: EMERGENCY MEDICINE
Payer: MEDICAID

## 2024-06-07 ENCOUNTER — APPOINTMENT (OUTPATIENT)
Dept: CT IMAGING | Facility: HOSPITAL | Age: 29
End: 2024-06-07
Attending: EMERGENCY MEDICINE
Payer: MEDICAID

## 2024-06-07 ENCOUNTER — HOSPITAL ENCOUNTER (OUTPATIENT)
Facility: HOSPITAL | Age: 29
Setting detail: OBSERVATION
Discharge: HOME | End: 2024-06-09
Attending: EMERGENCY MEDICINE | Admitting: STUDENT IN AN ORGANIZED HEALTH CARE EDUCATION/TRAINING PROGRAM
Payer: MEDICAID

## 2024-06-07 ENCOUNTER — APPOINTMENT (OUTPATIENT)
Dept: ULTRASOUND IMAGING | Facility: HOSPITAL | Age: 29
End: 2024-06-07
Attending: EMERGENCY MEDICINE
Payer: MEDICAID

## 2024-06-07 DIAGNOSIS — R10.9 ABDOMINAL PAIN, ACUTE: Primary | ICD-10-CM

## 2024-06-07 PROBLEM — R73.9 HYPERGLYCEMIA: Status: ACTIVE | Noted: 2024-06-07

## 2024-06-07 LAB
ALBUMIN SERPL-MCNC: 4.6 G/DL (ref 3.2–4.8)
ALP LIVER SERPL-CCNC: 78 U/L
ALT SERPL-CCNC: 21 U/L
ANION GAP SERPL CALC-SCNC: 9 MMOL/L (ref 0–18)
AST SERPL-CCNC: 23 U/L (ref ?–34)
B-HCG UR QL: NEGATIVE
BASOPHILS # BLD AUTO: 0.04 X10(3) UL (ref 0–0.2)
BASOPHILS NFR BLD AUTO: 0.5 %
BILIRUB DIRECT SERPL-MCNC: 0.1 MG/DL (ref ?–0.3)
BILIRUB SERPL-MCNC: 0.4 MG/DL (ref 0.3–1.2)
BILIRUB UR QL: NEGATIVE
BUN BLD-MCNC: 10 MG/DL (ref 9–23)
BUN/CREAT SERPL: 14.5 (ref 10–20)
CALCIUM BLD-MCNC: 9.4 MG/DL (ref 8.7–10.4)
CHLORIDE SERPL-SCNC: 109 MMOL/L (ref 98–112)
CLARITY UR: CLEAR
CO2 SERPL-SCNC: 22 MMOL/L (ref 21–32)
CREAT BLD-MCNC: 0.69 MG/DL
DEPRECATED RDW RBC AUTO: 41 FL (ref 35.1–46.3)
EGFRCR SERPLBLD CKD-EPI 2021: 120 ML/MIN/1.73M2 (ref 60–?)
EOSINOPHIL # BLD AUTO: 0.07 X10(3) UL (ref 0–0.7)
EOSINOPHIL NFR BLD AUTO: 0.9 %
ERYTHROCYTE [DISTWIDTH] IN BLOOD BY AUTOMATED COUNT: 13.9 % (ref 11–15)
GLUCOSE BLD-MCNC: 113 MG/DL (ref 70–99)
GLUCOSE UR-MCNC: NORMAL MG/DL
HCT VFR BLD AUTO: 38.2 %
HGB BLD-MCNC: 12.9 G/DL
HGB UR QL STRIP.AUTO: NEGATIVE
IMM GRANULOCYTES # BLD AUTO: 0.03 X10(3) UL (ref 0–1)
IMM GRANULOCYTES NFR BLD: 0.4 %
KETONES UR-MCNC: NEGATIVE MG/DL
LEUKOCYTE ESTERASE UR QL STRIP.AUTO: NEGATIVE
LIPASE SERPL-CCNC: 31 U/L (ref 13–75)
LYMPHOCYTES # BLD AUTO: 2.27 X10(3) UL (ref 1–4)
LYMPHOCYTES NFR BLD AUTO: 28.2 %
MCH RBC QN AUTO: 27.4 PG (ref 26–34)
MCHC RBC AUTO-ENTMCNC: 33.8 G/DL (ref 31–37)
MCV RBC AUTO: 81.3 FL
MONOCYTES # BLD AUTO: 0.41 X10(3) UL (ref 0.1–1)
MONOCYTES NFR BLD AUTO: 5.1 %
NEUTROPHILS # BLD AUTO: 5.22 X10 (3) UL (ref 1.5–7.7)
NEUTROPHILS # BLD AUTO: 5.22 X10(3) UL (ref 1.5–7.7)
NEUTROPHILS NFR BLD AUTO: 64.9 %
NITRITE UR QL STRIP.AUTO: NEGATIVE
OSMOLALITY SERPL CALC.SUM OF ELEC: 290 MOSM/KG (ref 275–295)
PH UR: 6.5 [PH] (ref 5–8)
PLATELET # BLD AUTO: 213 10(3)UL (ref 150–450)
POTASSIUM SERPL-SCNC: 3.6 MMOL/L (ref 3.5–5.1)
PROT SERPL-MCNC: 7.3 G/DL (ref 5.7–8.2)
PROT UR-MCNC: NEGATIVE MG/DL
RBC # BLD AUTO: 4.7 X10(6)UL
SODIUM SERPL-SCNC: 140 MMOL/L (ref 136–145)
SP GR UR STRIP: 1.02 (ref 1–1.03)
TROPONIN I SERPL HS-MCNC: <3 NG/L
UROBILINOGEN UR STRIP-ACNC: NORMAL
WBC # BLD AUTO: 8 X10(3) UL (ref 4–11)

## 2024-06-07 PROCEDURE — 71045 X-RAY EXAM CHEST 1 VIEW: CPT | Performed by: EMERGENCY MEDICINE

## 2024-06-07 PROCEDURE — 76830 TRANSVAGINAL US NON-OB: CPT | Performed by: EMERGENCY MEDICINE

## 2024-06-07 PROCEDURE — 93975 VASCULAR STUDY: CPT | Performed by: EMERGENCY MEDICINE

## 2024-06-07 PROCEDURE — 74177 CT ABD & PELVIS W/CONTRAST: CPT | Performed by: EMERGENCY MEDICINE

## 2024-06-07 PROCEDURE — 76856 US EXAM PELVIC COMPLETE: CPT | Performed by: EMERGENCY MEDICINE

## 2024-06-07 PROCEDURE — 99222 1ST HOSP IP/OBS MODERATE 55: CPT | Performed by: STUDENT IN AN ORGANIZED HEALTH CARE EDUCATION/TRAINING PROGRAM

## 2024-06-07 RX ORDER — ONDANSETRON 4 MG/1
4 TABLET, FILM COATED ORAL EVERY 8 HOURS PRN
Status: DISCONTINUED | OUTPATIENT
Start: 2024-06-07 | End: 2024-06-09

## 2024-06-07 RX ORDER — OXYCODONE HYDROCHLORIDE 5 MG/1
5 TABLET ORAL EVERY 6 HOURS PRN
Status: DISCONTINUED | OUTPATIENT
Start: 2024-06-07 | End: 2024-06-09

## 2024-06-07 RX ORDER — MORPHINE SULFATE 4 MG/ML
4 INJECTION, SOLUTION INTRAMUSCULAR; INTRAVENOUS ONCE
Status: COMPLETED | OUTPATIENT
Start: 2024-06-07 | End: 2024-06-07

## 2024-06-07 RX ORDER — MIDAZOLAM HYDROCHLORIDE 1 MG/ML
2 INJECTION INTRAMUSCULAR; INTRAVENOUS ONCE
Status: COMPLETED | OUTPATIENT
Start: 2024-06-07 | End: 2024-06-07

## 2024-06-07 RX ORDER — SODIUM CHLORIDE, SODIUM LACTATE, POTASSIUM CHLORIDE, CALCIUM CHLORIDE 600; 310; 30; 20 MG/100ML; MG/100ML; MG/100ML; MG/100ML
INJECTION, SOLUTION INTRAVENOUS CONTINUOUS
Status: DISCONTINUED | OUTPATIENT
Start: 2024-06-07 | End: 2024-06-09

## 2024-06-07 RX ORDER — HYDROMORPHONE HYDROCHLORIDE 1 MG/ML
0.3 INJECTION, SOLUTION INTRAMUSCULAR; INTRAVENOUS; SUBCUTANEOUS EVERY 2 HOUR PRN
Status: DISCONTINUED | OUTPATIENT
Start: 2024-06-07 | End: 2024-06-09

## 2024-06-07 RX ORDER — DOCUSATE SODIUM 100 MG/1
100 CAPSULE, LIQUID FILLED ORAL
Status: DISCONTINUED | OUTPATIENT
Start: 2024-06-07 | End: 2024-06-09

## 2024-06-07 RX ORDER — ONDANSETRON 2 MG/ML
4 INJECTION INTRAMUSCULAR; INTRAVENOUS EVERY 8 HOURS PRN
Status: DISCONTINUED | OUTPATIENT
Start: 2024-06-07 | End: 2024-06-09

## 2024-06-07 RX ORDER — GABAPENTIN 300 MG/1
300 CAPSULE ORAL EVERY 8 HOURS PRN
Status: DISCONTINUED | OUTPATIENT
Start: 2024-06-07 | End: 2024-06-09

## 2024-06-07 RX ORDER — IBUPROFEN 600 MG/1
600 TABLET ORAL EVERY 6 HOURS
Status: DISCONTINUED | OUTPATIENT
Start: 2024-06-07 | End: 2024-06-09

## 2024-06-07 RX ORDER — ACETAMINOPHEN 500 MG
1000 TABLET ORAL EVERY 6 HOURS
Status: DISCONTINUED | OUTPATIENT
Start: 2024-06-07 | End: 2024-06-09

## 2024-06-07 RX ORDER — DEXTROSE, SODIUM CHLORIDE, SODIUM LACTATE, POTASSIUM CHLORIDE, AND CALCIUM CHLORIDE 5; .6; .31; .03; .02 G/100ML; G/100ML; G/100ML; G/100ML; G/100ML
INJECTION, SOLUTION INTRAVENOUS CONTINUOUS
Status: DISCONTINUED | OUTPATIENT
Start: 2024-06-07 | End: 2024-06-07

## 2024-06-07 RX ORDER — MORPHINE SULFATE 2 MG/ML
2 INJECTION, SOLUTION INTRAMUSCULAR; INTRAVENOUS ONCE
Status: COMPLETED | OUTPATIENT
Start: 2024-06-07 | End: 2024-06-07

## 2024-06-07 NOTE — ED PROVIDER NOTES
Patient Seen in: [facility] Emergency Department      History     Chief Complaint   Patient presents with    Abdomen/Flank Pain     Stated Complaint: Abdominal/Flank Pain    Subjective:   HPI    Patient presents the emergency department complaining of diffuse abdominal pain.  She states that an hour ago she was trying to have a bowel movement and had severe lower abdominal discomfort which radiated up into her epigastric and lower chest region.  She states that she felt dizzy and felt her heart racing at that time.  She was able to have a bowel movement but continued having diffuse severe pain.  There is nausea but no vomiting.  There is no other aggravating relieving factors.  She has a history of cholecystectomy and appendectomy.    Objective:   Past Medical History:    Antepartum anemia (HCC)    Appendicitis    per NG: appendectomy,     History of multiple miscarriages    History of Papanicolaou smear of cervix    done per NG    History of pregnancy    nvsd    History of  delivery, currently pregnant (HCC)    Miscarriage (HCC)    per NG: D&C    Miscarriage within last 12 months    7 months ago    Obesity, Class II, BMI 35-39.9, isolated (see actual BMI)    BMI 39.25    SAB (spontaneous ) ([facility])    Visual impairment              Past Surgical History:   Procedure Laterality Date    Appendectomy      Cholecystectomy      D & c      D & c      per NG:Unknown sex; 8 week      2014    per Nnd degree perineal laceration. Fang was a full term live born 7 pound 13 ounce male delivered by .                Social History     Socioeconomic History    Marital status:    Tobacco Use    Smoking status: Never    Smokeless tobacco: Never   Vaping Use    Vaping status: Never Used   Substance and Sexual Activity    Alcohol use: No     Alcohol/week: 0.0 standard drinks of alcohol    Drug use: No   Other Topics Concern    Caffeine Concern Yes     Comment: per NG:  occasionally              Review of Systems    Positive for stated complaint: Abdominal/Flank Pain  Other systems are as noted in HPI.  Constitutional and vital signs reviewed.      All other systems reviewed and negative except as noted above.    Physical Exam     ED Triage Vitals [06/07/24 1544]   /80   Pulse 88   Resp 22   Temp 98.5 °F (36.9 °C)   Temp src Temporal   SpO2 99 %   O2 Device None (Room air)       Current Vitals:   Vital Signs  BP: 117/80  Pulse: 88  Resp: 22  Temp: 98.5 °F (36.9 °C)  Temp src: Temporal    Oxygen Therapy  SpO2: 99 %  O2 Device: None (Room air)            Physical Exam  Vitals and nursing note reviewed.   Constitutional:       General: She is not in acute distress.     Appearance: She is well-developed.   HENT:      Head: Normocephalic.      Nose: Nose normal.      Mouth/Throat:      Mouth: Mucous membranes are moist.   Eyes:      Conjunctiva/sclera: Conjunctivae normal.   Cardiovascular:      Rate and Rhythm: Normal rate and regular rhythm.      Heart sounds: No murmur heard.  Pulmonary:      Effort: Pulmonary effort is normal. No respiratory distress.      Breath sounds: Normal breath sounds.   Abdominal:      General: There is no distension.      Palpations: Abdomen is soft.      Tenderness: There is generalized abdominal tenderness. There is no guarding or rebound.      Hernia: No hernia is present.   Musculoskeletal:         General: No tenderness. Normal range of motion.      Cervical back: Normal range of motion and neck supple.   Skin:     General: Skin is warm and dry.      Capillary Refill: Capillary refill takes less than 2 seconds.      Findings: No rash.   Neurological:      Mental Status: She is alert and oriented to person, place, and time.               ED Course     Labs Reviewed   POCT PREGNANCY URINE - Normal   CBC WITH DIFFERENTIAL WITH PLATELET    Narrative:     The following orders were created for panel order CBC With Differential With Platelet.  Procedure                                Abnormality         Status                     ---------                               -----------         ------                     CBC W/ DIFFERENTIAL[[id number]]                              In process                   Please view results for these tests on the individual orders.   BASIC METABOLIC PANEL (8)   URINALYSIS, ROUTINE   HEPATIC FUNCTION PANEL (7)   LIPASE   TROPONIN I HIGH SENSITIVITY   CBC W/ DIFFERENTIAL     EKG    Rate, intervals and axes as noted on EKG Report.  Rate: 81 bpm  Rhythm: Sinus Rhythm  Reading: Normal EKG                          MDM                        Medical Decision Making      Disposition and Plan     Clinical Impression:  No diagnosis found.     Disposition:  There is no disposition on file for this visit.  There is no disposition time on file for this visit.    Follow-up:  No follow-up provider specified.        Medications Prescribed:  Current Discharge Medication List                                           Status                     ---------                               -----------         ------                     CBC W/ DIFFERENTIAL[[id number]]          Abnormal            Final result                 Please view results for these tests on the individual orders.   CBC WITH DIFFERENTIAL WITH PLATELET    Narrative:     The following orders were created for panel order CBC With Differential With Platelet.  Procedure                               Abnormality         Status                     ---------                               -----------         ------                     CBC W/ DIFFERENTIAL[[id number]]          Abnormal            Final result                 Please view results for these tests on the individual orders.   RAINBOW DRAW LIGHT GREEN   RAINBOW DRAW LIGHT GREEN   CBC W/ DIFFERENTIAL     EKG    Rate, intervals and axes as noted on EKG Report.  Rate: 81 bpm  Rhythm: Sinus Rhythm  Reading: Normal EKG                          MDM        Admission disposition: 6/7/2024 10:03 PM                       Medical Decision Making  Differential diagnosis considered for anemia, diverticulitis, ovarian torsion, ovarian cyst or leakage.    Problems Addressed:  Abdominal pain, acute: acute illness or injury    Amount and/or Complexity of Data Reviewed  Labs: ordered. Decision-making details documented in ED Course.     Details: CBC normal  Radiology: ordered and independent interpretation performed. Decision-making details documented in ED Course.     Details: CT shows hemoperitoneum.  Ultrasound shows questionable cystic lesion in the ovary.   Discussion of management or test interpretation with external provider(s): Case was discussed with gynecology.  Recommended observation in the hospital due to free blood in the abdomen of unclear origin but possibly ovarian in nature.    Risk  Prescription drug management.  Parenteral controlled substances.  Decision regarding hospitalization.        Disposition and Plan      Clinical Impression:  1. Abdominal pain, acute         Disposition:  Admit  6/7/2024 10:03 pm    Follow-up:  Anne Marie Gamez MD  [address] [place] [id number]  [phone]    Follow up in 1 week(s)            Medications Prescribed:  Discharge Medication List as of 6/9/2024 12:46 PM                            Hospital Problems       Present on Admission  Date Reviewed: 5/7/2024            ICD-10-CM Noted POA    * (Principal) Abdominal pain, acute R10.9 6/7/2024 Unknown    Hyperglycemia R73.9 6/7/2024 Yes

## 2024-06-07 NOTE — ED INITIAL ASSESSMENT (HPI)
Pt presents to ed with c/o abdominal pain.    Pt states she made a BM states it was painful and afterwards she started having severe lower abdominal and rectal pain. Pt states \"I feel like my stomach is swelling up.\" Reports one episode of vomiting.

## 2024-06-08 LAB
ATRIAL RATE: 81 BPM
BASOPHILS # BLD AUTO: 0.02 X10(3) UL (ref 0–0.2)
BASOPHILS # BLD AUTO: 0.03 X10(3) UL (ref 0–0.2)
BASOPHILS NFR BLD AUTO: 0.3 %
BASOPHILS NFR BLD AUTO: 0.5 %
DEPRECATED HBV CORE AB SER IA-ACNC: 12.5 NG/ML
DEPRECATED RDW RBC AUTO: 44.4 FL (ref 35.1–46.3)
DEPRECATED RDW RBC AUTO: 44.6 FL (ref 35.1–46.3)
EOSINOPHIL # BLD AUTO: 0.05 X10(3) UL (ref 0–0.7)
EOSINOPHIL # BLD AUTO: 0.07 X10(3) UL (ref 0–0.7)
EOSINOPHIL NFR BLD AUTO: 0.8 %
EOSINOPHIL NFR BLD AUTO: 1 %
ERYTHROCYTE [DISTWIDTH] IN BLOOD BY AUTOMATED COUNT: 14.3 % (ref 11–15)
ERYTHROCYTE [DISTWIDTH] IN BLOOD BY AUTOMATED COUNT: 14.4 % (ref 11–15)
HCT VFR BLD AUTO: 33.8 %
HCT VFR BLD AUTO: 34.4 %
HGB BLD-MCNC: 10.8 G/DL
HGB BLD-MCNC: 11 G/DL
IMM GRANULOCYTES # BLD AUTO: 0.01 X10(3) UL (ref 0–1)
IMM GRANULOCYTES # BLD AUTO: 0.01 X10(3) UL (ref 0–1)
IMM GRANULOCYTES NFR BLD: 0.1 %
IMM GRANULOCYTES NFR BLD: 0.2 %
LYMPHOCYTES # BLD AUTO: 1.97 X10(3) UL (ref 1–4)
LYMPHOCYTES # BLD AUTO: 2.62 X10(3) UL (ref 1–4)
LYMPHOCYTES NFR BLD AUTO: 32 %
LYMPHOCYTES NFR BLD AUTO: 38.8 %
MCH RBC QN AUTO: 27.2 PG (ref 26–34)
MCH RBC QN AUTO: 27.2 PG (ref 26–34)
MCHC RBC AUTO-ENTMCNC: 32 G/DL (ref 31–37)
MCHC RBC AUTO-ENTMCNC: 32 G/DL (ref 31–37)
MCV RBC AUTO: 84.9 FL
MCV RBC AUTO: 85.1 FL
MONOCYTES # BLD AUTO: 0.34 X10(3) UL (ref 0.1–1)
MONOCYTES # BLD AUTO: 0.34 X10(3) UL (ref 0.1–1)
MONOCYTES NFR BLD AUTO: 5 %
MONOCYTES NFR BLD AUTO: 5.5 %
NEUTROPHILS # BLD AUTO: 3.69 X10 (3) UL (ref 1.5–7.7)
NEUTROPHILS # BLD AUTO: 3.69 X10(3) UL (ref 1.5–7.7)
NEUTROPHILS # BLD AUTO: 3.76 X10 (3) UL (ref 1.5–7.7)
NEUTROPHILS # BLD AUTO: 3.76 X10(3) UL (ref 1.5–7.7)
NEUTROPHILS NFR BLD AUTO: 54.8 %
NEUTROPHILS NFR BLD AUTO: 61 %
P AXIS: 49 DEGREES
P-R INTERVAL: 134 MS
PLATELET # BLD AUTO: 178 10(3)UL (ref 150–450)
PLATELET # BLD AUTO: 182 10(3)UL (ref 150–450)
Q-T INTERVAL: 372 MS
QRS DURATION: 96 MS
QTC CALCULATION (BEZET): 432 MS
R AXIS: 54 DEGREES
RBC # BLD AUTO: 3.97 X10(6)UL
RBC # BLD AUTO: 4.05 X10(6)UL
T AXIS: 14 DEGREES
VENTRICULAR RATE: 81 BPM
WBC # BLD AUTO: 6.2 X10(3) UL (ref 4–11)
WBC # BLD AUTO: 6.8 X10(3) UL (ref 4–11)

## 2024-06-08 NOTE — PLAN OF CARE
Problem: Patient Centered Care  Goal: Patient preferences are identified and integrated in the patient's plan of care  Description: Interventions:  - What would you like us to know as we care for you? \"I am from home with my family.\"  - Provide timely, complete, and accurate information to patient/family  - Incorporate patient and family knowledge, values, beliefs, and cultural backgrounds into the planning and delivery of care  - Encourage patient/family to participate in care and decision-making at the level they choose  - Honor patient and family perspectives and choices  Outcome: Progressing     Problem: Patient/Family Goals  Goal: Patient/Family Long Term Goal  Description: Patient's Long Term Goal: \"to be discharged\"    Interventions:  -Follow up MD appt  -Take meds as prescribed  -Use of assistive device if needed  - See additional Care Plan goals for specific interventions  Outcome: Progressing  Goal: Patient/Family Short Term Goal  Description: Patient's Short Term Goal:     Interventions:   -Monitor VS  -Check labs results  -Administer IVF and IV ABT as ordered  -Provide pain meds as prescribed  - See additional Care Plan goals for specific interventions  Outcome: Progressing     Problem: GASTROINTESTINAL - ADULT  Goal: Minimal or absence of nausea and vomiting  Description: INTERVENTIONS:  - Maintain adequate hydration with IV or PO as ordered and tolerated  - Nasogastric tube to low intermittent suction as ordered  - Evaluate effectiveness of ordered antiemetic medications  - Provide nonpharmacologic comfort measures as appropriate  - Advance diet as tolerated, if ordered  - Obtain nutritional consult as needed  - Evaluate fluid balance  Outcome: Progressing     Problem: PAIN - ADULT  Goal: Verbalizes/displays adequate comfort level or patient's stated pain goal  Description: INTERVENTIONS:  - Encourage pt to monitor pain and request assistance  - Assess pain using appropriate pain scale  - Administer  analgesics based on type and severity of pain and evaluate response  - Implement non-pharmacological measures as appropriate and evaluate response  - Consider cultural and social influences on pain and pain management  - Manage/alleviate anxiety  - Utilize distraction and/or relaxation techniques  - Monitor for opioid side effects  - Notify MD/LIP if interventions unsuccessful or patient reports new pain  - Anticipate increased pain with activity and pre-medicate as appropriate  Outcome: Progressing     Problem: RISK FOR INFECTION - ADULT  Goal: Absence of fever/infection during anticipated neutropenic period  Description: INTERVENTIONS  - Monitor WBC  - Administer growth factors as ordered  - Implement neutropenic guidelines  Outcome: Progressing     Problem: SAFETY ADULT - FALL  Goal: Free from fall injury  Description: INTERVENTIONS:  - Assess pt frequently for physical needs  - Identify cognitive and physical deficits and behaviors that affect risk of falls.  - Loiza fall precautions as indicated by assessment.  - Educate pt/family on patient safety including physical limitations  - Instruct pt to call for assistance with activity based on assessment  - Modify environment to reduce risk of injury  - Provide assistive devices as appropriate  - Consider OT/PT consult to assist with strengthening/mobility  - Encourage toileting schedule  Outcome: Progressing     Patient is alert and oriented x 4. She complained of 9/10 abd pain, pain meds given as needed. Oriented to room. Safety and fall precautions initiated. Call light within reach. Frequent rounding by nursing staff.

## 2024-06-08 NOTE — H&P
[facility]  part of [facility]        HISTORY AND PHYSICAL        Subjective   Chief Complaint:  pelvic pain      History of Present Illness:    Indigo Arias is a  29 year old y/o  who presents for to ED with acute onset severe pelvic pain. States pain acutely started after having BM this afternoon. Denies abnl dc or vaginal bleeding. She was seen in office one month ago for ongoing pelvic pain. She has hx of tubal sterilization via bilateral salpingectomy          Past Medical History:    Antepartum anemia (HCC)    Appendicitis    per NG: appendectomy,     History of multiple miscarriages    History of Papanicolaou smear of cervix    done per NG    History of pregnancy    nvsd    History of  delivery, currently pregnant ([facility])    Miscarriage ([facility])    per NG: D&C    Miscarriage within last 12 months    7 months ago    Obesity, Class II, BMI 35-39.9, isolated (see actual BMI)    BMI 39.25    SAB (spontaneous ) ([facility])    Visual impairment       Past Surgical History:   Procedure Laterality Date    Appendectomy      Cholecystectomy      D & c      D & c      per NG:Unknown sex; 8 week      2014    per Nnd degree perineal laceration. Fang was a full term live born 7 pound 13 ounce male delivered by .       OB History    Para Term  AB Living   6 3 1 2 3 3   SAB IAB Ectopic Multiple Live Births   3 0 0 0 3       Allergies   Allergen Reactions    Adhesive Tape RASH         Current Outpatient Medications:     nitrofurantoin monohydrate macro 100 MG Oral Cap, , Disp: , Rfl:       Family History   Problem Relation Age of Onset    Diabetes Father     Heart Disorder Father     Heart Attack Father     Hypertension Paternal Grandmother     No Known Problems Mother          REVIEW OF SYSTEMS:   CONSTITUTIONAL: Negative for fever, chills, diaphoresis, weakness, fatigue, weight loss, weight gain.  ALLERGIES: Negative for  urticaria, hay fever, angioedema  EYES: Negative for blurry vision, decreased vision, loss of vision, eye pain, diplopia, photophobia, discharge  ENT: Negative for sore throat, nasal congestion, nasal discharge, epistaxis, tinnitus, hearing loss  CARDIOVASCULAR: Negative for chest pain, dyspnea on exertion, orthopnea, paroxysmal nocturnal dyspnea, edema, palpitations  RESPIRATORY: Negative for cough, hemoptysis, shortness of breath, pleuritic chest pain, wheezing  BREAST:  Denies breast mass, breast pain, nipple discharge or nipple pain.  ENDOCRINE: Negative for polydipsia/polyuria, palpitations, skin changes, temperature intolerance, unexpected weight changes  HEME-LYMPH: Negative for swollen lymph nodes, bleeding, bruising  GI: Negative abdominal pain, flank pain, nausea, vomiting, diarrhea, constipation, black stool, blood in stool  : Negative for dysuria, frequency/urgency, hematuria, genital discharge, vaginal bleeding, irregular menses, heavy menses, pelvic pain  NEURO: Negative for dizzy/vertigo, headache, focal weakness, numbness/tingling, speech problems, loss of consciousness, confusion, memory loss  MUSCULOSKELETAL: Negative for back pain, joint pain, joint stiffness, joint swelling, muscle pain, muscle weakness  SKIN: Negative for rash, itching, hives  PSYCH: Negative for anxiety, depression, physical abuse, sexual abuse      PHYSICAL EXAM:    /68   Pulse 94   Temp 98.5 °F (36.9 °C) (Temporal)   Resp 22   Ht 5' 2\" (1.575 m)   Wt 230 lb (104.3 kg)   LMP 04/30/2024 (Exact Date)   SpO2 97%   BMI 42.07 kg/m²        General   Mental Status - Alert. General Appearance - Cooperative. Orientation - Oriented X4. Build & Nutrition - Well nourished.    Abd: per ED physician      Lab Results   Component Value Date    WBC 8.0 06/07/2024    HGB 12.9 06/07/2024    HCT 38.2 06/07/2024    .0 06/07/2024    MCV 81.3 06/07/2024    RDW 13.9 06/07/2024     No components found for: \"ABOGROUP\", \"RHTYPE\",  \"RUBIGG\"      US PELVIS EV W DOPPLER (CPT=76856/97013/95829)    Result Date: 6/7/2024  CONCLUSION:    Limited exam as the patient had difficulty tolerating the transvaginal probe.  Mildly enlarged left ovary with appreciable arterial and venous Doppler flow at the time of exam.  These findings may be due to an underlying ovarian cystic lesion that is not well identified on ultrasound as there is suggestion of an adnexal cystic lesion on the prior CT, however intermittent ovarian torsion could appear similarly.  Recommend OBGYN consultation and consider further attention on follow-up ultrasound imaging.  Small to moderate amount of hemoperitoneum in the pelvis, which is again favored to be secondary to a ruptured adnexal cystic lesion.  No definitive adnexal cystic lesion is identified on this study, however the transvaginal component of the exam is limited.  No right ovarian torsion.  Endometrial thickness of 7 mm with minimal fluid in the endometrial canal.     Dictated by (CST): Campos David MD on 6/07/2024 at 9:21 PM     Finalized by (CST): Campos David MD on 6/07/2024 at 9:43 PM          CT ABDOMEN+PELVIS(CONTRAST ONLY)(CPT=74177)    Result Date: 6/7/2024  CONCLUSION:   Small to moderate volume of hemoperitoneum in the pelvis with mild enlargement of the left adnexa when compared to the right.  Overall, these findings are concerning for a ruptured adnexal cystic lesion and recommend further evaluation with a pelvic ultrasound.  No bowel obstruction, abnormal bowel wall thickening, or acute diverticulitis.  Postoperative changes from prior appendectomy.  Hepatic steatosis.  Postoperative changes from prior cholecystectomy without evidence of biliary ductal dilatation.  Mild bibasilar atelectasis.  Lesser incidental findings described above.     Dictated by (CST): Campos David MD on 6/07/2024 at 6:44 PM     Finalized by (CST): Campos David MD on 6/07/2024 at 6:51 PM          XR CHEST AP PORTABLE   (CPT=71045)    Result Date: 2024  CONCLUSION:  1. Suboptimal inspiration.  No acute airspace disease.    Dictated by (CST): Jerrod Ornelas MD on 2024 at 4:51 PM     Finalized by (CST): Jerrod Ornelas MD on 2024 at 4:51 PM               Assessment and Plan:      Principal Problem:    Abdominal pain, acute  Active Problems:    Hyperglycemia    Indigo Arias is a 29 year old  here with acute pelvic pain.  Reviewed clinical exam and image findings c/w ruptured ovarian cyst. Hgb and VS stable, so ongoing acute bleeding unlikely. Unlikely ovarian torsion. Will keep for observation and pain management.  - Tylenol 1000mg q6hr, and motrin 600mg q6hr atc  - dilaudid and oxycodone 5mg q6hr prn severe pain  - Clear liquid diet  - repeat CBC in AM or prn         Anne Marie Gamez MD

## 2024-06-08 NOTE — PLAN OF CARE
Problem: Patient Centered Care  Goal: Patient preferences are identified and integrated in the patient's plan of care  Description: Interventions:  - What would you like us to know as we care for you? \"I am from home with my family.\"  - Provide timely, complete, and accurate information to patient/family  - Incorporate patient and family knowledge, values, beliefs, and cultural backgrounds into the planning and delivery of care  - Encourage patient/family to participate in care and decision-making at the level they choose  - Honor patient and family perspectives and choices  Outcome: Progressing     Problem: GASTROINTESTINAL - ADULT  Goal: Minimal or absence of nausea and vomiting  Description: INTERVENTIONS:  - Maintain adequate hydration with IV or PO as ordered and tolerated  - Nasogastric tube to low intermittent suction as ordered  - Evaluate effectiveness of ordered antiemetic medications  - Provide nonpharmacologic comfort measures as appropriate  - Advance diet as tolerated, if ordered  - Obtain nutritional consult as needed  - Evaluate fluid balance  Outcome: Progressing     Problem: PAIN - ADULT  Goal: Verbalizes/displays adequate comfort level or patient's stated pain goal  Description: INTERVENTIONS:  - Encourage pt to monitor pain and request assistance  - Assess pain using appropriate pain scale  - Administer analgesics based on type and severity of pain and evaluate response  - Implement non-pharmacological measures as appropriate and evaluate response  - Consider cultural and social influences on pain and pain management  - Manage/alleviate anxiety  - Utilize distraction and/or relaxation techniques  - Monitor for opioid side effects  - Notify MD/LIP if interventions unsuccessful or patient reports new pain  - Anticipate increased pain with activity and pre-medicate as appropriate  Outcome: Progressing     Problem: RISK FOR INFECTION - ADULT  Goal: Absence of fever/infection during anticipated  neutropenic period  Description: INTERVENTIONS  - Monitor WBC  - Administer growth factors as ordered  - Implement neutropenic guidelines  Outcome: Progressing     Problem: SAFETY ADULT - FALL  Goal: Free from fall injury  Description: INTERVENTIONS:  - Assess pt frequently for physical needs  - Identify cognitive and physical deficits and behaviors that affect risk of falls.  - Goodman fall precautions as indicated by assessment.  - Educate pt/family on patient safety including physical limitations  - Instruct pt to call for assistance with activity based on assessment  - Modify environment to reduce risk of injury  - Provide assistive devices as appropriate  - Consider OT/PT consult to assist with strengthening/mobility  - Encourage toileting schedule  Outcome: Progressing     Patient A/Ox4, self care, encourage activity. Continue IV fluids. Clear diet. Pain manage per protocol. Call light within reach.

## 2024-06-08 NOTE — PROGRESS NOTES
Progress Note     Indigo Arias Patient Status:  Observation    1995 MRN [id number]   Location [facility] 5SW/SE Attending Anne Marie Gamez MD   Hosp Day # 0 PCP Destiny Durand MD     Chief Complaint: abdominal pain    Subjective:   S: Patient reports ongoing abd pain. Worse near epigastrium, denies n/v or fevers or chills. Denies urinary symptoms.  Pain is worse with movement.  Not really hungry.  Pain is generalized in abdomen and pelvic area, does not radiate anywhere else. Meds help control the pain.    Review of Systems:   10 point ROS completed and was negative, except for pertinent positive and negatives stated in subjective.    Objective:   Vital signs:  Temp:  [97.7 °F (36.5 °C)-99.3 °F (37.4 °C)] 98.7 °F (37.1 °C)  Pulse:  [74-94] 84  Resp:  [10-30] 16  BP: (103-134)/(60-82) 117/75  SpO2:  [93 %-99 %] 95 %    Wt Readings from Last 3 Encounters:   24 230 lb (104.3 kg)   24 228 lb (103.4 kg)   24 230 lb (104.3 kg)       Intake/Output:    Intake/Output Summary (Last 24 hours) at 2024 1239  Last data filed at 2024 1044  Gross per 24 hour   Intake 1158.33 ml   Output --   Net 1158.33 ml         Physical Exam:    General: No acute distress. Alert ,      , morbidly obese  Respiratory: Clear to auscultation bilaterally. No wheezes. No rhonchi.    Cardiovascular: S1, S2. Regular rate and rhythm. No murmurs, rubs or gallops.   Abdomen: Soft, mild ttp above umbilicus. No masses. No rebound or guarding. +carnett's sign   Neurologic: No focal neurological deficits.   Musculoskeletal: Moves all extremities.  Extremities: No edema.    Results:   Diagnostic Data:      Labs:    Selected labs - last 24 hours:  Endo  Lytes  Renal   Glu 113  Na 140 Ca 9.4  BUN 10   POC Gluc  -  K 3.6 PO4 -  Cr 0.69   A1c -  Cl 109 Mg -  eGFR 120   TSH -  CO2 22.0         LFT  CBC  Other   AST 23  WBC 6.2  PTT - Procal -   ALT 21  Hb 11.0  INR - CRP -   APk 78  Hct 34.4  Trop - D dim -    T hu 0.4  .0  pBNP -  BNP -  - Ferritin  -   Prot 7.3    CK  - Lactate  -   Alb 4.6    LDL  - COVID  -     Imaging: Imaging data reviewed in Epic.    Medications:    acetaminophen  1,000 mg Oral Q6H    docusate sodium  100 mg Oral BID@0600,1800    ibuprofen  600 mg Oral q6h       Assessment & Plan:   ASSESSMENT / PLAN:     #abd/pelvic pain: discussed CT and US findings c/w ruptured cyst and mild-mod hemoperitoneum. No acute abdomen. Hgb with mild drop but overall stable this morning. Do not suspect ongoing bleeding. We discussed cannot completely r/o intermittent ovarian torsion but do not suspect at this time. Pain likely from ruptured cyst and hemoperitoneum. Discussed blood acts as irritant to internal organs and likely cause of her pain. Given that there is no continued ongoing bleeding, plan for expectant management as blood is typically reabsorbed.    Plan of care: cont inpatient monitoring and pain mgmt  - Tylenol 1000mg q6hr, and motrin 600mg q6hr atc  - dilaudid and oxycodone 5mg q6hr prn severe pain  - Clear liquid diet  - repeat CBC in AM or prn   - consider repeat US if worsening abdominal pain           Quality:  DVT Mechanical Prophylaxis:        DVT Pharmacologic Prophylaxis   Medication   None                Code Status: Not on file  Magana: No urinary catheter in place  Magana Duration (in days):   Central line:    RAMESH:     Will the patient be referred to TCC on discharge?: no  Discharge is dependent on: pain control  At this point Ms. Sam Arias is expected to be discharge to: 6/9/24      Anne Marie Gamez MD    Supplementary Documentation:

## 2024-06-08 NOTE — ED QUICK NOTES
Orders for admission, patient is aware of plan and ready to go upstairs. Any questions, please call ED RN Damion at extension [phone].     Patient Covid vaccination status: Unvaccinated     COVID Test Ordered in ED: None    COVID Suspicion at Admission: N/A    Running Infusions:  None    Mental Status/LOC at time of transport: A&Ox4    Other pertinent information:   CIWA score: N/A   NIH score:  N/A

## 2024-06-08 NOTE — ED QUICK NOTES
Rounding Completed    Plan of Care reviewed. Waiting for Ultrasound .  Elimination needs assessed.  Provided medication requested by pt and prescribed by Morena PARKER..    Bed is locked and in lowest position. Call light within reach.

## 2024-06-09 VITALS
DIASTOLIC BLOOD PRESSURE: 78 MMHG | TEMPERATURE: 98 F | BODY MASS INDEX: 42.33 KG/M2 | OXYGEN SATURATION: 97 % | HEART RATE: 74 BPM | SYSTOLIC BLOOD PRESSURE: 122 MMHG | WEIGHT: 230 LBS | HEIGHT: 62 IN | RESPIRATION RATE: 16 BRPM

## 2024-06-09 LAB
BASOPHILS # BLD AUTO: 0.02 X10(3) UL (ref 0–0.2)
BASOPHILS NFR BLD AUTO: 0.4 %
DEPRECATED RDW RBC AUTO: 43.3 FL (ref 35.1–46.3)
EOSINOPHIL # BLD AUTO: 0.06 X10(3) UL (ref 0–0.7)
EOSINOPHIL NFR BLD AUTO: 1.2 %
ERYTHROCYTE [DISTWIDTH] IN BLOOD BY AUTOMATED COUNT: 14.1 % (ref 11–15)
HCT VFR BLD AUTO: 32.2 %
HGB BLD-MCNC: 10.4 G/DL
IMM GRANULOCYTES # BLD AUTO: 0.01 X10(3) UL (ref 0–1)
IMM GRANULOCYTES NFR BLD: 0.2 %
LYMPHOCYTES # BLD AUTO: 2.35 X10(3) UL (ref 1–4)
LYMPHOCYTES NFR BLD AUTO: 45.5 %
MCH RBC QN AUTO: 27.2 PG (ref 26–34)
MCHC RBC AUTO-ENTMCNC: 32.3 G/DL (ref 31–37)
MCV RBC AUTO: 84.1 FL
MONOCYTES # BLD AUTO: 0.26 X10(3) UL (ref 0.1–1)
MONOCYTES NFR BLD AUTO: 5 %
NEUTROPHILS # BLD AUTO: 2.46 X10 (3) UL (ref 1.5–7.7)
NEUTROPHILS # BLD AUTO: 2.46 X10(3) UL (ref 1.5–7.7)
NEUTROPHILS NFR BLD AUTO: 47.7 %
PLATELET # BLD AUTO: 163 10(3)UL (ref 150–450)
RBC # BLD AUTO: 3.83 X10(6)UL
WBC # BLD AUTO: 5.2 X10(3) UL (ref 4–11)

## 2024-06-09 PROCEDURE — 99238 HOSP IP/OBS DSCHRG MGMT 30/<: CPT | Performed by: STUDENT IN AN ORGANIZED HEALTH CARE EDUCATION/TRAINING PROGRAM

## 2024-06-09 NOTE — PLAN OF CARE
Patient c/o mild pain only. Refusing tylenol and motrin. Advanced to general diet, tolerating with nausea. Ambulating independently. Discharged home with family. All instructions given, patient verbalized understanding. Safety plan in place.     Problem: Patient Centered Care  Goal: Patient preferences are identified and integrated in the patient's plan of care  Description: Interventions:  - What would you like us to know as we care for you? \"I am from home with my family.\"  - Provide timely, complete, and accurate information to patient/family  - Incorporate patient and family knowledge, values, beliefs, and cultural backgrounds into the planning and delivery of care  - Encourage patient/family to participate in care and decision-making at the level they choose  - Honor patient and family perspectives and choices  Outcome: Progressing     Problem: Patient/Family Goals  Goal: Patient/Family Short Term Goal  Description: Patient's Short Term Goal:     Interventions:   -Monitor VS  -Check labs results  -Administer IVF and IV ABT as ordered  -Provide pain meds as prescribed  - See additional Care Plan goals for specific interventions  Outcome: Progressing     Problem: GASTROINTESTINAL - ADULT  Goal: Minimal or absence of nausea and vomiting  Description: INTERVENTIONS:  - Maintain adequate hydration with IV or PO as ordered and tolerated  - Nasogastric tube to low intermittent suction as ordered  - Evaluate effectiveness of ordered antiemetic medications  - Provide nonpharmacologic comfort measures as appropriate  - Advance diet as tolerated, if ordered  - Obtain nutritional consult as needed  - Evaluate fluid balance  Outcome: Progressing     Problem: PAIN - ADULT  Goal: Verbalizes/displays adequate comfort level or patient's stated pain goal  Description: INTERVENTIONS:  - Encourage pt to monitor pain and request assistance  - Assess pain using appropriate pain scale  - Administer analgesics based on type and  severity of pain and evaluate response  - Implement non-pharmacological measures as appropriate and evaluate response  - Consider cultural and social influences on pain and pain management  - Manage/alleviate anxiety  - Utilize distraction and/or relaxation techniques  - Monitor for opioid side effects  - Notify MD/LIP if interventions unsuccessful or patient reports new pain  - Anticipate increased pain with activity and pre-medicate as appropriate  Outcome: Progressing     Problem: RISK FOR INFECTION - ADULT  Goal: Absence of fever/infection during anticipated neutropenic period  Description: INTERVENTIONS  - Monitor WBC  - Administer growth factors as ordered  - Implement neutropenic guidelines  Outcome: Progressing     Problem: SAFETY ADULT - FALL  Goal: Free from fall injury  Description: INTERVENTIONS:  - Assess pt frequently for physical needs  - Identify cognitive and physical deficits and behaviors that affect risk of falls.  - [place] fall precautions as indicated by assessment.  - Educate pt/family on patient safety including physical limitations  - Instruct pt to call for assistance with activity based on assessment  - Modify environment to reduce risk of injury  - Provide assistive devices as appropriate  - Consider OT/PT consult to assist with strengthening/mobility  - Encourage toileting schedule  Outcome: Progressing

## 2024-06-09 NOTE — DISCHARGE SUMMARY
GYN DISCHARGE SUMMARY      Date of Admission:  6/7/2024  Date of Discharge: 6/9/2024          Admission Diagnoses:  Abdominal pain, acute [R10.9]  Discharge Diagnoses:  Principal Problem:    Abdominal pain, acute  Active Problems:    Hyperglycemia        Admit Date:   6/7/2024   Discharge Date:       Condition at Discharge:  Stable      Operations/Procedures this visit:  none      Hospital Course:    The patient was admitted on 6/7 for acute pelvic/abd pain. discussed CT and US findings c/w ruptured cyst and mild-mod hemoperitoneum. No acute abdomen. Hgb with mild drop but overall stable this morning. Do not suspect ongoing bleeding. We discussed cannot completely r/o intermittent ovarian torsion but do not suspect at this time. Pain likely from ruptured cyst and hemoperitoneum. Discussed blood acts as irritant to internal organs and likely cause of her pain. Given that there is no continued ongoing bleeding, plan for expectant management as blood is typically reabsorbed. Hgb has been stable. She was tolerating regular diet and ambulating with no issues.     Condition at Discharge:  /78 (BP Location: Right arm)   Pulse 74   Temp 97.9 °F (36.6 °C) (Oral)   Resp 16   Ht 5' 2\" (1.575 m)   Wt 230 lb (104.3 kg)   LMP 04/30/2024 (Exact Date)   SpO2 97%   BMI 42.07 kg/m²     Physical Exam   Gen - NAD  Abdomen -soft, ND, NT  Fundus - firm, NT  Incision - C/D/I  Extremeties - NT    Discharge Disposition:  Home or Self Care     Discharge Medications:     Discharge Medications        STOP taking these medications      nitrofurantoin monohydrate macro 100 MG Caps  Commonly known as: Macrobid                  Plan of Care:   Diet: Regular As Tolerated  Activity:  as tolerated  Follow Up:  Dr. Gamez 1-2weeks        Anne Marie Gamez MD

## 2024-06-12 ENCOUNTER — OFFICE VISIT (OUTPATIENT)
Dept: OBGYN CLINIC | Facility: CLINIC | Age: 29
End: 2024-06-12

## 2024-06-12 VITALS — SYSTOLIC BLOOD PRESSURE: 128 MMHG | BODY MASS INDEX: 42 KG/M2 | DIASTOLIC BLOOD PRESSURE: 72 MMHG | WEIGHT: 230 LBS

## 2024-06-12 DIAGNOSIS — N83.202 LEFT OVARIAN CYST: Primary | ICD-10-CM

## 2024-06-12 NOTE — PROGRESS NOTES
[facility]  Obstetrics and Gynecology  Gyne Problem Visit      Indigo Arias is a 29 year old female  here for f/u on LOV cyst and recent hospital admission.    Patient's last menstrual period was 2024 (exact date).   patient was admitted on  for acute pelvic/abd pain. discussed CT and US findings c/w ruptured cyst and mild-mod hemoperitoneum. No acute abdomen. Hgb with mild drop but overall stable this morning. Do not suspect ongoing bleeding. We discussed cannot completely r/o intermittent ovarian torsion but do not suspect at this time. Pain likely from ruptured cyst and hemoperitoneum. Discussed blood acts as irritant to internal organs and likely cause of her pain. Given that there is no continued ongoing bleeding, plan for expectant management as blood is typically reabsorbed. Hgb has been stable.     She reports her pain as resolved. Ambulating and tolerating general diet without issues.    Pap: 2021: NIL  Contraception:tubal ligation (b/l salpingectomy)    OBSTETRICS HISTORY:  OB History    Para Term  AB Living   6 3 1 2 3 3   SAB IAB Ectopic Multiple Live Births   3 0 0 0 3       GYNE HISTORY:      Menarche: 13 (2024  2:52 PM)  Period Cycle (Days): 28 (2024  1:34 PM)  Period Duration (Days): 5-7 (2024  1:34 PM)  Period Flow: moderate (2024  1:34 PM)  Use of Birth Control (if yes, specify type): Tubal Ligation (2024  1:34 PM)  Hx Prior Abnormal Pap: No (2024  2:52 PM)  Pap Date: 17 (2024  2:52 PM)  Pap Result Notes: normal (2024  2:52 PM)        Latest Ref Rng & Units 2021    11:34 AM 2017    11:24 AM   RECENT PAP RESULTS   Thinprep Pap Negative for intraepithelial lesion or malignancy Negative for intraepithelial lesion or malignancy  Negative for intraepithelial lesion or malignancy          History   Sexual Activity    Sexual activity: Not on file       MEDICAL HISTORY:  Past Medical History:   Diagnosis Date     Antepartum anemia ([facility]) 10/14/2021    Appendicitis 2011    per NG: appendectomy, 2011    History of multiple miscarriages     History of Papanicolaou smear of cervix 2014    done per NG    History of pregnancy     nvsd    History of  delivery, currently pregnant ([facility]) 2021    Miscarriage ([facility])     per NG: D&C    Miscarriage within last 12 months     7 months ago    Obesity, Class II, BMI 35-39.9, isolated (see actual BMI)     BMI 39.25    SAB (spontaneous ) ([facility])     Visual impairment      Past Surgical History:   Procedure Laterality Date    Appendectomy      Cholecystectomy      D & c  2012    D & c      per NG:Unknown sex; 8 week      2014    per Nnd degree perineal laceration. Fang was a full term live born 7 pound 13 ounce male delivered by .       SOCIAL HISTORY:  Social History     Socioeconomic History    Marital status:      Spouse name: Not on file    Number of children: Not on file    Years of education: Not on file    Highest education level: Not on file   Occupational History    Not on file   Tobacco Use    Smoking status: Never    Smokeless tobacco: Never   Vaping Use    Vaping status: Never Used   Substance and Sexual Activity    Alcohol use: No     Alcohol/week: 0.0 standard drinks of alcohol    Drug use: No    Sexual activity: Not on file   Other Topics Concern     Service Not Asked    Blood Transfusions Not Asked    Caffeine Concern Yes     Comment: per NG: occasionally    Occupational Exposure Not Asked    Hobby Hazards Not Asked    Sleep Concern Not Asked    Stress Concern Not Asked    Weight Concern Not Asked    Special Diet Not Asked    Back Care Not Asked    Exercise Not Asked    Bike Helmet Not Asked    Seat Belt Not Asked    Self-Exams Not Asked   Social History Narrative    Not on file     Social Determinants of Health     Financial Resource Strain: Not on file   Food Insecurity: No Food Insecurity  (6/7/2024)    Food Insecurity     Food Insecurity: Never true   Transportation Needs: No Transportation Needs (6/7/2024)    Transportation Needs     Lack of Transportation: No     Car Seat: Not on file   Physical Activity: Not on file   Stress: Not on file   Social Connections: Not on file   Housing Stability: Low Risk  (6/7/2024)    Housing Stability     Housing Instability: No     Housing Instability Emergency: Not on file     Crib or Bassinette: Not on file       MEDICATIONS:  No current outpatient medications on file.    ALLERGIES:    Allergies   Allergen Reactions    Adhesive Tape RASH         REVIEW OF SYSTEMS:  Review of Systems    PHYSICAL EXAM:  /72   Wt 230 lb (104.3 kg)   LMP 06/11/2024 (Exact Date)   BMI 42.07 kg/m²     GENERAL: well developed, well nourished, in no apparent distress  ABDOMEN: Soft, non distended; non tender, no masses    ASSESSMENT:     Discussed options for prevention of future cyst formation, would include OCPs to suppress ovulation. At this time, she does not desire to start OCPs. Plan to repeat pelvic US in 6wks to follow resolution of cyst.  Return precx reviewed  Rtc for annual or prn.       ICD-10-CM    1. Left ovarian cyst  N83.202 US PELVIS (TRANSABDOMINAL PELVIS)  (CPT=76856)          Plan:        Requested Prescriptions      No prescriptions requested or ordered in this encounter       Anne Marie Gamez MD  6/12/2024

## 2024-06-19 ENCOUNTER — TELEPHONE (OUTPATIENT)
Dept: OBGYN CLINIC | Facility: CLINIC | Age: 29
End: 2024-06-19

## 2024-06-19 NOTE — TELEPHONE ENCOUNTER
She can alternate between Tylenol 1000mg q6hrs and Motrin 600mg q6hrs. She may also do ice/heat therapy. If persistent or worsening pain go to ED.     Pt informed of Md advise, verbalized understanding and agreement. No further question.

## 2024-06-19 NOTE — TELEPHONE ENCOUNTER
----- Message from [employer]  sent at 6/19/2024 11:37 AM CDT -----  Regarding: Important   Contact: [phone]  Maynor estceci teniendo nuevamente mucho dolor,  La medicina no me ayuda.  Necesito saber que puedo hacer

## 2024-06-19 NOTE — TELEPHONE ENCOUNTER
Pt with hx of ruptured cyst and pelvic pain reporting continued moderate intermittent pelvic pains rated 6/10.  Taking 800 mg of Tylenol with no pain relief. Denied taking any other form of pain medication.   Pt asked if she could take Motrin as well.   Advised pt she could use it alternatively with Tylenol if no hx of adverse side effect or reaction.   Pt also informed message would be routed to provider to specify strength she can take for Motrin and for any additional recs if warranted. Per pt soonest she was able to schedule ultrasound was July 1st.     Routing for provider to note/further advise.

## 2024-07-26 ENCOUNTER — HOSPITAL ENCOUNTER (OUTPATIENT)
Dept: ULTRASOUND IMAGING | Facility: HOSPITAL | Age: 29
Discharge: HOME | End: 2024-07-26
Attending: STUDENT IN AN ORGANIZED HEALTH CARE EDUCATION/TRAINING PROGRAM
Payer: MEDICAID

## 2024-07-26 DIAGNOSIS — N83.202 LEFT OVARIAN CYST: ICD-10-CM

## 2024-07-26 PROCEDURE — 76856 US EXAM PELVIC COMPLETE: CPT | Performed by: STUDENT IN AN ORGANIZED HEALTH CARE EDUCATION/TRAINING PROGRAM

## 2024-10-28 ENCOUNTER — HOSPITAL ENCOUNTER (EMERGENCY)
Facility: HOSPITAL | Age: 29
Discharge: HOME | End: 2024-10-28
Attending: EMERGENCY MEDICINE
Payer: MEDICAID

## 2024-10-28 VITALS
DIASTOLIC BLOOD PRESSURE: 68 MMHG | RESPIRATION RATE: 16 BRPM | WEIGHT: 230 LBS | HEART RATE: 64 BPM | BODY MASS INDEX: 42.33 KG/M2 | TEMPERATURE: 98 F | SYSTOLIC BLOOD PRESSURE: 111 MMHG | HEIGHT: 62 IN | OXYGEN SATURATION: 99 %

## 2024-10-28 DIAGNOSIS — M62.830 SPASM OF MUSCLE OF LOWER BACK: Primary | ICD-10-CM

## 2024-10-28 PROCEDURE — 99283 EMERGENCY DEPT VISIT LOW MDM: CPT

## 2024-10-28 RX ORDER — CYCLOBENZAPRINE HCL 10 MG
10 TABLET ORAL 3 TIMES DAILY PRN
Qty: 10 TABLET | Refills: 0 | Status: SHIPPED | OUTPATIENT
Start: 2024-10-28 | End: 2024-11-04

## 2024-10-28 NOTE — ED PROVIDER NOTES
Patient Seen in: [facility] Emergency Department      History     Chief Complaint   Patient presents with    Back Pain     Stated Complaint: Back Pain    Subjective:   HPI      Pt is 28 yo F who p/w low back pain s/p lifting something heavy behind her at work 2-3 days ago. Felt a pinch in her back but then got worse through the weekend. Radiates down right leg. Only feels pain with movement such as walking. No numbness, bowel or bladder changes. No direct trauma. Gets some relief with pain meds from [place]. Was told by employer to go to ER.     Objective:     Past Medical History:    Antepartum anemia ([facility])    Appendicitis    per NG: appendectomy,     History of multiple miscarriages    History of Papanicolaou smear of cervix    done per NG    History of pregnancy    nvsd    History of  delivery, currently pregnant ([facility])    Miscarriage ([facility])    per NG: D&C    Miscarriage within last 12 months    7 months ago    Obesity, Class II, BMI 35-39.9, isolated (see actual BMI)    BMI 39.25    SAB (spontaneous ) ([facility])    Visual impairment              Past Surgical History:   Procedure Laterality Date    Appendectomy      Cholecystectomy      D & c      D & c      per NG:Unknown sex; 8 week      2014    per Nnd degree perineal laceration. Fang was a full term live born 7 pound 13 ounce male delivered by .                Social History     Socioeconomic History    Marital status:    Tobacco Use    Smoking status: Never    Smokeless tobacco: Never   Vaping Use    Vaping status: Never Used   Substance and Sexual Activity    Alcohol use: No     Alcohol/week: 0.0 standard drinks of alcohol    Drug use: No   Other Topics Concern    Caffeine Concern Yes     Comment: per NG: occasionally     Social Drivers of Health     Food Insecurity: No Food Insecurity (2024)    Food Insecurity     Food Insecurity: Never true   Transportation Needs: No Transportation Needs  (6/7/2024)    Transportation Needs     Lack of Transportation: No   Housing Stability: Low Risk  (6/7/2024)    Housing Stability     Housing Instability: No                  Physical Exam     ED Triage Vitals   BP 10/28/24 0326 117/72   Pulse 10/28/24 0326 74   Resp 10/28/24 0326 18   Temp 10/28/24 0328 97.9 °F (36.6 °C)   Temp src 10/28/24 0328 Temporal   SpO2 10/28/24 0326 99 %   O2 Device 10/28/24 0326 None (Room air)       Current Vitals:   Vital Signs  BP: 117/72  Pulse: 74  Resp: 18  Temp: 97.9 °F (36.6 °C)  Temp src: Temporal    Oxygen Therapy  SpO2: 99 %  O2 Device: None (Room air)        Physical Exam  GENERAL: No acute distress, awake and alert  HEENT: EOMI, PERRL  Neck: supple  Back: no midline tenderness  Extremities: FROM of all extremities  Neuro: CN intact, normal speech, normal gait, 5/5 motor strength in all extremities, no focal deficits  SKIN: warm, dry, no rashes        ED Course   Labs Reviewed - No data to display     MDM      Medical Decision Making  Back strain/sprain/muscle spasm s/p lifting-no red flag signs of emergent back pain. Recommend no heavy lifting, muscle relaxants, close f/u. Advised on return precautions    Amount and/or Complexity of Data Reviewed  External Data Reviewed: labs.     Details: Labs 2024 reviewed    Risk  Prescription drug management.        Disposition and Plan     Clinical Impression:  1. Spasm of muscle of lower back         Disposition:  Discharge  10/28/2024  5:09 am    Follow-up:  Yair Colbert MD  [address] [address]  [phone]    Follow up            Medications Prescribed:  Current Discharge Medication List        START taking these medications    Details   cyclobenzaprine 10 MG Oral Tab Take 1 tablet (10 mg total) by mouth 3 (three) times daily as needed.  Qty: 10 tablet, Refills: 0                 Supplementary Documentation:

## 2024-12-20 ENCOUNTER — HOSPITAL ENCOUNTER (OUTPATIENT)
Dept: MAMMOGRAPHY | Facility: HOSPITAL | Age: 29
Discharge: HOME | End: 2024-12-20
Attending: FAMILY MEDICINE
Payer: MEDICAID

## 2024-12-20 ENCOUNTER — HOSPITAL ENCOUNTER (OUTPATIENT)
Dept: ULTRASOUND IMAGING | Facility: HOSPITAL | Age: 29
Discharge: HOME | End: 2024-12-20
Attending: FAMILY MEDICINE
Payer: MEDICAID

## 2024-12-20 DIAGNOSIS — N64.4 BREAST PAIN, LEFT: ICD-10-CM

## 2024-12-20 PROCEDURE — 77062 BREAST TOMOSYNTHESIS BI: CPT | Performed by: FAMILY MEDICINE

## 2024-12-20 PROCEDURE — 77066 DX MAMMO INCL CAD BI: CPT | Performed by: FAMILY MEDICINE

## 2024-12-20 PROCEDURE — 76642 ULTRASOUND BREAST LIMITED: CPT | Performed by: FAMILY MEDICINE

## 2025-01-07 ENCOUNTER — OFFICE VISIT (OUTPATIENT)
Dept: OBGYN CLINIC | Facility: CLINIC | Age: 30
End: 2025-01-07

## 2025-01-07 VITALS
HEIGHT: 62 IN | HEART RATE: 78 BPM | WEIGHT: 230 LBS | DIASTOLIC BLOOD PRESSURE: 81 MMHG | SYSTOLIC BLOOD PRESSURE: 121 MMHG | BODY MASS INDEX: 42.33 KG/M2

## 2025-01-07 DIAGNOSIS — N92.6 IRREGULAR PERIODS: Primary | ICD-10-CM

## 2025-01-07 DIAGNOSIS — E66.01 MORBID OBESITY (HCC): ICD-10-CM

## 2025-01-07 PROCEDURE — 99213 OFFICE O/P EST LOW 20 MIN: CPT | Performed by: STUDENT IN AN ORGANIZED HEALTH CARE EDUCATION/TRAINING PROGRAM

## 2025-01-07 RX ORDER — CYCLOBENZAPRINE HCL 5 MG
1 TABLET ORAL NIGHTLY PRN
COMMUNITY
Start: 2024-12-19

## 2025-01-07 NOTE — PROGRESS NOTES
[facility]  Obstetrics and Gynecology  Focused Gynecology Problem Exam      Indigo Arias is a 29 year old female presenting for No Period/no Cycle (NP, No cycle X 2 months)  .    HPI:     Chief Complaint   Patient presents with    No Period/no Cycle     NP, No cycle X 2 months     Had recent work injury; doing PT but doesn't feel like it's helping much    No menses for last 2 months  Had some cramping last month but no menses but this wasn't menstrual cramping    Works at [employer]    Menarche: 13 (2024  2:52 PM)  Period Cycle (Days): 28 except these last two months (2025  5:26 PM)  Period Duration (Days): 4-5 (2025  5:26 PM)  Period Flow: moderate (2025  5:26 PM)  Use of Birth Control (if yes, specify type): Tubal Ligation (2025  5:26 PM)  Hx Prior Abnormal Pap: No (2025  5:26 PM)  Pap Date: 21 (2025  5:26 PM)  Pap Result Notes: negative (2025  5:26 PM)      Medications (Active prior to today's visit):  Current Outpatient Medications   Medication Sig Dispense Refill    cyclobenzaprine 5 MG Oral Tab Take 1 tablet (5 mg total) by mouth nightly as needed.       Allergies:  Allergies[1]  HISTORY:     OB History    Para Term  AB Living   6 3 1 2 3 3   SAB IAB Ectopic Multiple Live Births   3       3      # Outcome Date GA Lbr Denzel/2nd Weight Sex Type Anes PTL Lv   6  10/22/21 25w5d 12:32 2 lb 6.5 oz (1.09 kg) M NORMAL SPONT EPI Y [place]      Complications: Group B beta strep +,  labor   5 SAB 21 14w0d             Birth Comments: D and C   4  16 33w0d  4 lb (1.814 kg) M Vag-Spont   [place]      Complications:  premature rupture of membranes, onset of labor within 24 hours of rupture, third trimester   3 2015              Birth Comments: D & C      Complications: Other - see comments   2 Term 14 40w0d  7 lb 13 oz (3.544 kg) M Vag-Spont EPI N [place]   1 2012                 Past Medical History:    Antepartum anemia (HCC)     Appendicitis    per NG: appendectomy,     History of multiple miscarriages    History of Papanicolaou smear of cervix    done per NG    History of pregnancy    nvsd    History of  delivery, currently pregnant (HCC)    Miscarriage (HCC)    per NG: D&C    Miscarriage within last 12 months    7 months ago    Obesity, Class II, BMI 35-39.9, isolated (see actual BMI)    BMI 39.25    SAB (spontaneous ) (HCC)    Visual impairment       Past Surgical History:   Procedure Laterality Date    Appendectomy      Cholecystectomy      D & c      D & c      per NG:Unknown sex; 8 week      2014    per Nnd degree perineal laceration. Fang was a full term live born 7 pound 13 ounce male delivered by .       Family History   Problem Relation Age of Onset    Diabetes Father     Heart Disorder Father     Heart Attack Father     Hypertension Paternal Grandmother     No Known Problems Mother        Social History     Socioeconomic History    Marital status:      Spouse name: Not on file    Number of children: Not on file    Years of education: Not on file    Highest education level: Not on file   Occupational History    Not on file   Tobacco Use    Smoking status: Never    Smokeless tobacco: Never   Vaping Use    Vaping status: Never Used   Substance and Sexual Activity    Alcohol use: No     Alcohol/week: 0.0 standard drinks of alcohol    Drug use: No    Sexual activity: Not on file   Other Topics Concern     Service Not Asked    Blood Transfusions Not Asked    Caffeine Concern Yes     Comment: per NG: occasionally    Occupational Exposure Not Asked    Hobby Hazards Not Asked    Sleep Concern Not Asked    Stress Concern Not Asked    Weight Concern Not Asked    Special Diet Not Asked    Back Care Not Asked    Exercise Not Asked    Bike Helmet Not Asked    Seat Belt Not Asked    Self-Exams Not Asked   Social History Narrative    Not on file     Social Drivers of Health      Financial Resource Strain: Not on file   Food Insecurity: No Food Insecurity (6/7/2024)    Food Insecurity     Food Insecurity: Never true   Transportation Needs: No Transportation Needs (6/7/2024)    Transportation Needs     Lack of Transportation: No     Car Seat: Not on file   Physical Activity: Not on file   Stress: Not on file   Social Connections: Not on file   Housing Stability: Low Risk  (6/7/2024)    Housing Stability     Housing Instability: No     Housing Instability Emergency: Not on file     Crib or Bassinette: Not on file       ROS:   Review of Systems:    Constitutional:    denies fever / chills  Eyes:     denies blurred or double vision  Cardiovascular:  denies chest pain or palpitations  Respiratory:    denies shortness of breath  Gastrointestinal:  denies severe abdominal pain, frequent diarrhea or constipation, nausea / vomiting  Genitourinary:    denies dysuria, bothersome incontinence  Skin/Breast:   denies any breast pain, lumps, or discharge  Neurological:    denies frequent severe headaches  Psychiatric:   denies depression or anxiety, thoughts of harming self or others  Heme/Lymph:    denies easy bruising or bleeding  PHYSICAL EXAM:   /81   Pulse 78   Ht 5' 2\" (1.575 m)   Wt 230 lb (104.3 kg)   LMP 10/15/2024 (Approximate)   BMI 42.07 kg/m²     GENERAL: well developed, well nourished, in no apparent distress       ASSESSMENT:    Reassurance provided, consider labs. Hx of bilateral salpingectomy.   Discussed if no menses after 3 months will need provera challenge and further investigation.  Suspect could be stress related given recent work injury. Discussed importance of weight mgmt as well.    BMI 42  Recommend intensive lifestyle and behavioral modifications at this time for weight loss  Avoid processed, poor quality carbohydrates, refined grains, flour and sugar  Exercise goals:   Aim for 150 minutes of moderate level exercise weekly with 2-3 days of strength  training    Behavior Modification:  Plan meals in advance.  Read nutrition labels    Drink 64 ounces of noncaloric beverages per day no fruit juices regular soda    Maintain  food journal    Utilize portion control strategies to reduce calorie intake  Identify triggers for eating  Try to eat slowly and sitting down. Aim for 20 to 30 minutes to complete a meal     Nutritional Goals :           Calorie-controlled diet: 1500 thompson, Limit carbohydrates to <100 gms per day, Protein goal of 120-130 gms per day.  fiber  25-35g   Goals:  Keep a food log  Aim for 150 minutes of moderate exercise per week  Increase fruit and vegetable servings to 5 to 6/day  Improve sleep and stress  Weight loss printed instructions/ info given        ICD-10-CM    1. Irregular periods  N92.6 Prolactin     HCG, Beta Subunit (Quant Pregnancy Test)     Testosterone,Free And Total      2. Morbid obesity (HCC)  E66.01           PLAN:   Rtc in one month for annual/follow up    ORDERS:     Orders Placed This Encounter   Procedures    Prolactin    HCG, Beta Subunit (Quant Pregnancy Test)    Testosterone,Free And Total     PRESCRIPTIONS:     Requested Prescriptions      No prescriptions requested or ordered in this encounter     IMAGING/ REFERRALS:    None     Anne Marie Gamez MD  1/7/2025  5:32 PM               [1]   Allergies  Allergen Reactions    Adhesive Tape RASH

## 2025-01-17 ENCOUNTER — HOSPITAL ENCOUNTER (OUTPATIENT)
Dept: MRI IMAGING | Facility: HOSPITAL | Age: 30
Discharge: HOME | End: 2025-01-17
Attending: PHYSICIAN ASSISTANT
Payer: MEDICAID

## 2025-01-17 DIAGNOSIS — M54.9 BACK PAIN WITHOUT SCIATICA: ICD-10-CM

## 2025-01-17 PROCEDURE — 72148 MRI LUMBAR SPINE W/O DYE: CPT | Performed by: PHYSICIAN ASSISTANT

## 2025-05-02 NOTE — ADDENDUM NOTE
Addended by: Emely Frazier on: 10/20/2021 02:17 PM     Modules accepted: Orders
Addended by: Placido Barragan on: 10/5/2021 09:59 AM     Modules accepted: Orders
None

## (undated) DEVICE — SOL  .9 3000ML

## (undated) DEVICE — 60 ML SYRINGE LUER-LOCK TIP: Brand: MONOJECT

## (undated) DEVICE — ENCORE® LATEX MICRO SIZE 7.5, STERILE LATEX POWDER-FREE SURGICAL GLOVE: Brand: ENCORE

## (undated) DEVICE — LAPAROSCOPY: Brand: MEDLINE INDUSTRIES, INC.

## (undated) DEVICE — D AND C PACK: Brand: MEDLINE INDUSTRIES, INC.

## (undated) DEVICE — TROCAR: Brand: KII FIOS FIRST ENTRY

## (undated) DEVICE — [HIGH FLOW INSUFFLATOR,  DO NOT USE IF PACKAGE IS DAMAGED,  KEEP DRY,  KEEP AWAY FROM SUNLIGHT,  PROTECT FROM HEAT AND RADIOACTIVE SOURCES.]: Brand: PNEUMOSURE

## (undated) DEVICE — CURRETTE 7MM CVD

## (undated) DEVICE — MANIPULATOR CATH ESCP KRNR

## (undated) DEVICE — CANISTER SCT BTL SL CAP BRKLY

## (undated) DEVICE — CANISTER SAFETOUCH SYST DISP

## (undated) DEVICE — CURRETTE 8MM CVD

## (undated) DEVICE — DISPOSABLE SUCTION/IRRIGATOR TUBE SET: Brand: AHTO

## (undated) DEVICE — SOL  .9 1000ML BTL

## (undated) DEVICE — 12 ML SYRINGE LUER-LOCK TIP: Brand: MONOJECT

## (undated) DEVICE — SUTURE VICRYL 3-0 SH

## (undated) DEVICE — TROCAR: Brand: KII SLEEVE

## (undated) DEVICE — Device: Brand: JELCO

## (undated) DEVICE — ENSEAL X1 TISSUE SEALER, CURVED JAW, 37 CM SHAFT LENGTH: Brand: ENSEAL

## (undated) DEVICE — SOL H2O 1000ML BTL

## (undated) DEVICE — TUBING SUCTION COLLECTION SET

## (undated) DEVICE — UNDYED BRAIDED (POLYGLACTIN 910), SYNTHETIC ABSORBABLE SUTURE: Brand: COATED VICRYL

## (undated) DEVICE — 6 ML SYRINGE LUER-LOCK TIP: Brand: MONOJECT

## (undated) DEVICE — MATTRESS PT HOVERMATT 1/2L 34W

## (undated) DEVICE — ENCORE® LATEX ACCLAIM SIZE 7, STERILE LATEX POWDER-FREE SURGICAL GLOVE: Brand: ENCORE

## (undated) NOTE — ED AVS SNAPSHOT
Jorge Alberto Trejo   MRN: [id number]    Department:  [facility] Emergency Department   Date of Visit:  7/30/2018           Disclosure     Insurance plans vary and the physician(s) referred by the ER may not be covered by your plan.  Please contact yo CARE PHYSICIAN AT ONCE OR RETURN IMMEDIATELY TO THE EMERGENCY DEPARTMENT. If you have been prescribed any medication(s), please fill your prescription right away and begin taking the medication(s) as directed.   If you believe that any of the medications

## (undated) NOTE — ED AVS SNAPSHOT
Mendoza Byers   MRN: [id number]    Department:  [facility] Emergency Department   Date of Visit:  10/23/2017           Disclosure     Insurance plans vary and the physician(s) referred by the ER may not be covered by your plan.  Please contact y CARE PHYSICIAN AT ONCE OR RETURN IMMEDIATELY TO THE EMERGENCY DEPARTMENT. If you have been prescribed any medication(s), please fill your prescription right away and begin taking the medication(s) as directed.   If you believe that any of the medications

## (undated) NOTE — LETTER
Date & Time: 8/19/2020, 6:56 PM  Patient: Saturnino Sacks  Encounter Provider(s):    Adamaris Carver MD       To Whom It May Concern:    Saturnino Sacks was seen and treated in our department on 8/19/2020.  She can return to work

## (undated) NOTE — ED AVS SNAPSHOT
[facility] Emergency Department    [place] [address]     [address] [place] [address]    Phone:  [phone]    Fax:  424 [facility]   MRN: [id number]    Department:  [facility] Emergency Department   Date of [place] Where to Get Your Medications      You can get these medications from any pharmacy     Bring a paper prescription for each of these medications    - Amoxicillin-Pot Clavulanate 875-125 MG Tabs  - saccharomyces boulardii 250 MG Caps  - TraMADol HCl 50 [facility] Emergency Department. Follow-up care is at the discretion of that Physician.   If you need additional assistance selecting a physician, you may call the [employer]Me Physician Referral and Class Registration line at [phone] [facility] [address]  [place] [address]) [phone]                Additional Information       We are concerned for your overall well being:    - If you are a smoker or have smoked in the last 12 months, we encourage you to explore op

## (undated) NOTE — LETTER
9/13/2021              [address] [phone]         Wei Salvage,    Fue un placer verla. Obrien resultado del Papanicolau es normal. No necesita más pruebas en kourtney momento.  Espero verla en obrien próx

## (undated) NOTE — MR AVS SNAPSHOT
[facility]  [address] [place] [address] [phone]               Thank you for choosing us for your health care visit with Kaden Wilson MD.  We are glad to serve you and happy to provide you with this summary of your vis information, go to [email or web address] and click on the Sign Up Now link in the [employer] box. Enter your Dermira Activation Code exactly as it appears below along with your Zip Code and Date of Birth to complete the sign-up process.  If you do

## (undated) NOTE — ED AVS SNAPSHOT
Speedy Mcguire   MRN: [id number]    Department:  [facility] Emergency Department   Date of Visit:  2/3/2020           Disclosure     Insurance plans vary and the physician(s) referred by the ER may not be covered by your plan.  Please CARE PHYSICIAN AT ONCE OR RETURN IMMEDIATELY TO THE EMERGENCY DEPARTMENT. If you have been prescribed any medication(s), please fill your prescription right away and begin taking the medication(s) as directed.   If you believe that any of the medications

## (undated) NOTE — ED AVS SNAPSHOT
Toño Madrid   MRN: [id number]    Department:  [facility] Emergency Department   Date of Visit:  10/21/2018           Disclosure     Insurance plans vary and the physician(s) referred by the ER may not be covered by your plan.  Please contact y CARE PHYSICIAN AT ONCE OR RETURN IMMEDIATELY TO THE EMERGENCY DEPARTMENT. If you have been prescribed any medication(s), please fill your prescription right away and begin taking the medication(s) as directed.   If you believe that any of the medications

## (undated) NOTE — MR AVS SNAPSHOT
[facility]  [address] [facility] [place] [address] [phone]               Thank you for choosing us for your health care visit with Jay Boyd.  Hilda Ferreira MD.  We are glad to serve you and happy to provide you with this summary of your vis [employer] will allow you to access patient instructions from your recent visit,  view other health information, and more. To sign up or find more information, go to [email or web address] and click on the Sign Up Now link in the [employer] box.      Enter

## (undated) NOTE — LETTER
MINOR CASE LETTER      3/24/2022        Dear Sabi Up are having a right and left salpingectomy/operative laparoscopy on Tuesday, 04/26/2022 at 12:30pm. Ariana Petit are to arrive 2 hours prior, which would be 10:30am.     Do not eat or drink anything (including water) after midnight the night before surgery. If your procedure is scheduled later in the day, then nothing by mouth for 6 hours before arrival to the hospital.    Ariana Petit are to call this office if you have any cold or flu symptoms 2 days before your scheduled surgery. Please avoid aspirin 7 days before surgery. Avoid Ibuprofen, Motrin, Aleve, or Naprosyn for 3 days before surgery. You will be contacted by PreAdmission Testing (PAT) usually within the week before surgery. They will take a short medical history and let you know if any preoperative testing is needed. Please make arrangements for someone to drive you home after your surgery. Please feel free to contact our office at  if you have any questions regarding these instructions or your procedure. Sincerely,          Swetha Brush.  Francine Galicia MD  [facility], [address] [place] [address] Street, [address] [address] [phone]

## (undated) NOTE — Clinical Note
IUP at 13w4d  Threatened miscarriage  H/o  delivery  Class III obesity  Normal NT ultrasound    RECOMMENDATIONS:  Continue care with Dr. Jaya Mcdaniel and CL at 20 weeks  Early GDM screen  Third trimester ultrasound  Daily low dose ASA  Weekly

## (undated) NOTE — LETTER
Date & Time: 10/28/2024, 5:09 AM  Patient: Indigo Arias  Encounter Provider(s):    Hellen Miranda MD       To Whom It May Concern:    Indigo Arias was seen and treated in our department on 10/28/2024. She can return to work with these limitations: no heavy lifting for 3 days .    If you have any questions or concerns, please do not hesitate to call.        _____________________________  Physician/APC Signature

## (undated) NOTE — LETTER
AUTHORIZATION FOR SURGICAL OPERATION OR OTHER PROCEDURE    1.  I hereby authorize Dr. Yazmin Potter, and [facility] staff assigned to my case to perform the following operation and/or procedure at the [facility]:    IUD insertion__________________ Time:  ________ A. M.  P.M.        Patient Name:  ______________________________________________________  (please print)      Patient signature:  ___________________________________________________             Relationship to Patient:

## (undated) NOTE — MR AVS SNAPSHOT
[facility]  [address] [place] [address] [phone]               Thank you for choosing us for your health care visit with Susana Callaahn.  Maribell Nunez MD.  We are glad to serve you and happy to provide you with this summary of your vis [employer]Â®     Sign up for [employer], your secure online medical record. [employer] will allow you to access patient instructions from your recent visit,  view other health information, and more. To sign up or find more information, go to [email or web address] increments are effective and add up over the week   2 ½ hours per week – spread out over time Use a roseline to keep you motivated   Don’t forget strength training with weights and resistance Set goals and track your progress   You don’t need to join a gym.

## (undated) NOTE — MR AVS SNAPSHOT
[address] [facility] [id number] [phone]               Thank you for choosing us for your health care visit with Zaheer Kennedy MD.  We are glad to serve you and happy to provide you with requirements for authorization, please wait 5-7 days and then contact your physician's office. At that time, you will be provided with any authorization numbers or be assured that none are required. You can then schedule your appointment.  Failure to obtain hydrocortisone 1 % Oint   Apply 1 Application topically 2 (two) times daily. loratadine 10 MG Tabs   Take 1 tablet (10 mg total) by mouth daily.    Commonly known as:  CLARITIN                Where to Get Your Medications      These medications w

## (undated) NOTE — ED AVS SNAPSHOT
[facility] Emergency Department    [place] [address]     [place] [address]    Phone:  [phone]    Fax:  432 Daryn Perez   MRN: [id number]    Department:  [facility] Emergency Department   Date of [facility] EMERGENCY DEPARTMENT AT [facility] and Class Registration line at [phone] or find a doctor online by visiting [email or web address].    IF THERE IS ANY CHANGE OR WORSENING OF YOUR CONDITION, CALL YOUR PRIMARY CARE PHYSICIAN AT ONCE OR RETURN IMMEDIATELY TO [address] [place] [address].     If

## (undated) NOTE — LETTER
Dear new mom:    Sorry, we missed you! The nurses of [facility] have tried to reach you by phone to ask if you have any questions regarding your health or the health and care of your new little one.     We hope you are doing moms and their babies (up to 7 months of age). Relatives and friends are welcome to attend with the new mom. Includes breastfeeding support with an IBCLC (lactation consultant) who is available to answer your breastfeeding questions.     Mom & Baby Hour (El go to [email or web address] or call [phone].  Fitness at [address]: [phone]  • Adria: [phone]    Facebook Groups  Healthy Driven Moms—Celebrating motherhood.  For expec

## (undated) NOTE — LETTER
Date & Time: 10/30/2017, 10:14 AM  Patient: Briseyda Calderon    Dear Briseyda Calderon,    We are unable to reach you by phone and would like to follow up with you regarding your visit to the Emergency Department.         Please contact the Emergency Department

## (undated) NOTE — LETTER
8/10/2021              [address] [place] [address],[address] [id number]         Justin Faby,    Fue un placer verla.  Blankenship resultado del cultivo vaginal muestra levadura vaginal. Utilice Allan en el mostrador para tratar

## (undated) NOTE — ED AVS SNAPSHOT
Tiara Burnett   MRN: [id number]    Department:  [facility] Emergency Department   Date of Visit:  1/15/2018           Disclosure     Insurance plans vary and the physician(s) referred by the ER may not be covered by your plan.  Please contact yo CARE PHYSICIAN AT ONCE OR RETURN IMMEDIATELY TO THE EMERGENCY DEPARTMENT. If you have been prescribed any medication(s), please fill your prescription right away and begin taking the medication(s) as directed.   If you believe that any of the medications

## (undated) NOTE — ED AVS SNAPSHOT
[facility] Emergency Department    [place] [address] [place] [address]    Phone:  [phone]    Fax:  557 [facility]   MRN: [id number]    Department:  [facility] Emergency Department   Date of [facility] EMERGENCY DEPARTMENT AT [facility] please call our  at [phone]. Si tiene problemas para programar lurdes zenobia de seguimiento según lo indicado, llame al encargado de leeanna al [phone].     It is our goal to assure that you are completely satisfied with every aspect o doctor until you can check with your doctor. Please bring the medication list to your next doctor's appointment. Any imaging studies and labs completed today can be reviewed in your Fashion Play[employer] account.   You may have had testing done that requires us to co [employer] will allow you to access patient instructions from your recent visit,  view other health information, and more. To sign up or find more information, go to [email or web address] and click on the Sign Up Now link in the [employer] box.      Enter

## (undated) NOTE — Clinical Note
Continue care with Dr. Rucker  Daily low dose ASA  Weekly NST at 34 weeks  Monthly growth ultrasounds starting at 28 weeks, add BPP to each growth ultrasound at 32 weeks and beyond  Limit gestational weight gain to 18 lbs or less  Continue vaginal proges

## (undated) NOTE — ED AVS SNAPSHOT
Low Dickinson   MRN: [id number]    Department:  [facility] Emergency Department   Date of Visit:  6/2/2018           Disclosure     Insurance plans vary and the physician(s) referred by the ER may not be covered by your plan.  Please contact you CARE PHYSICIAN AT ONCE OR RETURN IMMEDIATELY TO THE EMERGENCY DEPARTMENT. If you have been prescribed any medication(s), please fill your prescription right away and begin taking the medication(s) as directed.   If you believe that any of the medications

## (undated) NOTE — Clinical Note
IUP at 16w4d  Threatened miscarriage  H/o  delivery  Class III obesity  Normal cervical length     RECOMMENDATIONS:  Continue care with Dr. Janis Varghese and CL at 20 weeks  Early GDM screen  Third trimester ultrasound  Daily low dose ASA  Week

## (undated) NOTE — ED AVS SNAPSHOT
Eda Joseph   MRN: [id number]    Department:  [facility] Emergency Department   Date of Visit:  8/23/2019           Disclosure     Insurance plans vary and the physician(s) referred by the ER may not be covered by your plan.  Please CARE PHYSICIAN AT ONCE OR RETURN IMMEDIATELY TO THE EMERGENCY DEPARTMENT. If you have been prescribed any medication(s), please fill your prescription right away and begin taking the medication(s) as directed.   If you believe that any of the medications

## (undated) NOTE — MR AVS SNAPSHOT
[place]  [address]  [phone]               Thank you for choosing us for your health care visit with [facility], OD.   We are glad to serve you and happy to provide you with this summary of hydrocortisone 1 % Oint   Apply 1 Application topically 2 (two) times daily. loratadine 10 MG Tabs   Take 1 tablet (10 mg total) by mouth daily.    Commonly known as:  CLARITIN           Miconazole Nitrate 2 % Crea   Apply 1 Application topically

## (undated) NOTE — ED AVS SNAPSHOT
[facility] Emergency Department    [facility]. [address] [address]    Phone:  [phone]    Fax:  864 [facility] [place]   MRN: [id number]    Department:  [facility] Emergency Department   Date of [facility]S [facility] EMERG[facility]Y DEPARTMENT AT [facility] and Class Registration line at [phone] or find a doctor online by visiting [email or web address].    IF THERE IS ANY CHANGE OR WORSENING OF YOUR CONDITION, CALL YOUR PRIMARY CARE PHYSICIAN AT ONCE OR RETURN IMMEDIATELY TO [address] [place] [address].     If

## (undated) NOTE — LETTER
AUTHORIZATION FOR SURGICAL OPERATION OR OTHER PROCEDURE    1. I hereby authorize Dr. Lavern Mcadams and [facility] staff assigned to my case to perform the following operation and/or procedure at the [facility]:  IUD removal.  _______    2.   My phy Relationship to Patient:           []  Parent    Responsible person                          []  Spouse  In case of minor or                    [] Other  _____________   Incompetent name:  __________________________________________________